# Patient Record
Sex: FEMALE | Race: WHITE | Employment: OTHER | URBAN - METROPOLITAN AREA
[De-identification: names, ages, dates, MRNs, and addresses within clinical notes are randomized per-mention and may not be internally consistent; named-entity substitution may affect disease eponyms.]

---

## 2019-01-24 ENCOUNTER — OP HISTORICAL/CONVERTED ENCOUNTER (OUTPATIENT)
Dept: OTHER | Age: 69
End: 2019-01-24

## 2020-02-25 ENCOUNTER — OP HISTORICAL/CONVERTED ENCOUNTER (OUTPATIENT)
Dept: OTHER | Age: 70
End: 2020-02-25

## 2020-03-06 ENCOUNTER — IP HISTORICAL/CONVERTED ENCOUNTER (OUTPATIENT)
Dept: OTHER | Age: 70
End: 2020-03-06

## 2020-08-06 DIAGNOSIS — M79.661 PAIN IN BOTH LOWER LEGS: Primary | ICD-10-CM

## 2020-08-06 DIAGNOSIS — M79.662 PAIN IN BOTH LOWER LEGS: Primary | ICD-10-CM

## 2020-08-06 RX ORDER — TRAMADOL HYDROCHLORIDE 50 MG/1
50 TABLET ORAL
Qty: 40 TAB | Refills: 0 | Status: SHIPPED | OUTPATIENT
Start: 2020-08-06 | End: 2020-08-16

## 2020-09-04 VITALS
SYSTOLIC BLOOD PRESSURE: 108 MMHG | BODY MASS INDEX: 25.87 KG/M2 | DIASTOLIC BLOOD PRESSURE: 64 MMHG | HEART RATE: 68 BPM | RESPIRATION RATE: 18 BRPM | HEIGHT: 63 IN | WEIGHT: 146 LBS

## 2020-09-04 PROBLEM — M79.7 FIBROMYALGIA: Status: ACTIVE | Noted: 2018-03-28

## 2020-09-04 RX ORDER — EZETIMIBE 10 MG/1
10 TABLET ORAL DAILY
COMMUNITY
End: 2021-08-11 | Stop reason: SDUPTHER

## 2020-09-04 RX ORDER — AMLODIPINE BESYLATE 2.5 MG/1
2.5 TABLET ORAL DAILY
COMMUNITY
End: 2021-07-02

## 2020-09-04 RX ORDER — PREDNISONE 10 MG/1
TABLET ORAL
COMMUNITY
End: 2021-06-01

## 2020-09-04 RX ORDER — ROPINIROLE 0.5 MG/1
TABLET, FILM COATED ORAL 3 TIMES DAILY
COMMUNITY
End: 2021-01-29 | Stop reason: SDUPTHER

## 2020-09-04 RX ORDER — TRAMADOL HYDROCHLORIDE 50 MG/1
50 TABLET ORAL
COMMUNITY
End: 2020-09-18

## 2020-09-04 RX ORDER — OXYCODONE AND ACETAMINOPHEN 5; 325 MG/1; MG/1
TABLET ORAL
COMMUNITY

## 2020-09-04 RX ORDER — ZOLPIDEM TARTRATE 5 MG/1
TABLET ORAL
COMMUNITY
End: 2020-12-15

## 2020-09-04 RX ORDER — LEVOTHYROXINE SODIUM 50 UG/1
50 TABLET ORAL
COMMUNITY
End: 2021-05-03

## 2020-09-04 RX ORDER — LISINOPRIL AND HYDROCHLOROTHIAZIDE 12.5; 2 MG/1; MG/1
TABLET ORAL DAILY
COMMUNITY
End: 2020-12-23

## 2020-09-18 DIAGNOSIS — M79.661 PAIN IN BOTH LOWER LEGS: Primary | ICD-10-CM

## 2020-09-18 DIAGNOSIS — M79.662 PAIN IN BOTH LOWER LEGS: Primary | ICD-10-CM

## 2020-09-18 RX ORDER — TRAMADOL HYDROCHLORIDE 50 MG/1
TABLET ORAL
Qty: 40 TAB | Refills: 0 | Status: SHIPPED | OUTPATIENT
Start: 2020-09-18 | End: 2020-09-28

## 2020-10-02 ENCOUNTER — OFFICE VISIT (OUTPATIENT)
Dept: INTERNAL MEDICINE CLINIC | Age: 70
End: 2020-10-02
Payer: MEDICARE

## 2020-10-02 VITALS
TEMPERATURE: 97.7 F | RESPIRATION RATE: 18 BRPM | BODY MASS INDEX: 27.14 KG/M2 | HEIGHT: 63 IN | DIASTOLIC BLOOD PRESSURE: 80 MMHG | WEIGHT: 153.2 LBS | SYSTOLIC BLOOD PRESSURE: 138 MMHG

## 2020-10-02 DIAGNOSIS — G25.81 RESTLESS LEGS: ICD-10-CM

## 2020-10-02 DIAGNOSIS — I10 ESSENTIAL HYPERTENSION: Primary | ICD-10-CM

## 2020-10-02 PROCEDURE — 99213 OFFICE O/P EST LOW 20 MIN: CPT | Performed by: INTERNAL MEDICINE

## 2020-10-02 RX ORDER — TRAMADOL HYDROCHLORIDE 50 MG/1
50 TABLET ORAL
COMMUNITY
End: 2020-10-25

## 2020-10-02 RX ORDER — DICLOFENAC SODIUM 75 MG/1
1 TABLET, DELAYED RELEASE ORAL 2 TIMES DAILY
COMMUNITY
Start: 2020-08-28 | End: 2021-07-02

## 2020-10-02 NOTE — PROGRESS NOTES
No Almeida presents today for   Chief Complaint   Patient presents with    Hypertension       Is someone accompanying this pt? no    Is the patient using any DME equipment during OV? no    Depression Screening:  3 most recent PHQ Screens 10/2/2020   Little interest or pleasure in doing things Not at all   Feeling down, depressed, irritable, or hopeless Not at all   Total Score PHQ 2 0       Learning Assessment:  Learning Assessment 10/2/2020   PRIMARY LEARNER Patient   HIGHEST LEVEL OF EDUCATION - PRIMARY LEARNER  2 YEARS Wilbert PRIMARY LEARNER NONE   CO-LEARNER CAREGIVER No   PRIMARY LANGUAGE ENGLISH   LEARNER PREFERENCE PRIMARY DEMONSTRATION   ANSWERED BY patient   RELATIONSHIP SELF       Abuse Screening:  Abuse Screening Questionnaire 10/2/2020   Do you ever feel afraid of your partner? N   Are you in a relationship with someone who physically or mentally threatens you? N   Is it safe for you to go home? Y       Fall Risk  Fall Risk Assessment, last 12 mths 10/2/2020   Able to walk? Yes   Fall in past 12 months? No       ADL  ADL Assessment 10/2/2020   Feeding yourself No Help Needed   Getting from bed to chair No Help Needed   Getting dressed No Help Needed   Bathing or showering No Help Needed   Walk across the room (includes cane/walker) No Help Needed   Using the telphone No Help Needed   Taking your medications No Help Needed   Preparing meals No Help Needed   Managing money (expenses/bills) No Help Needed   Moderately strenuous housework (laundry) No Help Needed   Shopping for personal items (toiletries/medicines) No Help Needed   Shopping for groceries No Help Needed   Driving No Help Needed   Climbing a flight of stairs No Help Needed   Getting to places beyond walking distances No Help Needed       Health Maintenance reviewed and discussed and ordered per Provider.     Health Maintenance Due   Topic Date Due    Hepatitis C Screening  1950    DTaP/Tdap/Td series (1 - Tdap) 10/30/1973    Lipid Screen  10/30/1992    Shingrix Vaccine Age 50> (1 of 2) 10/30/2002    Breast Cancer Screen Mammogram  10/30/2002    FOBT Q1Y Age 50-75  10/30/2002    GLAUCOMA SCREENING Q2Y  10/30/2017    Bone Densitometry (Dexa) Screening  10/30/2017    Pneumococcal 65+ years (1 of 1 - PPSV23) 10/30/2017    Flu Vaccine (1) 09/01/2020   . Coordination of Care:  1. Have you been to the ER, urgent care clinic since your last visit? Hospitalized since your last visit? Yes, [facility] for bronchitis     2. Have you seen or consulted any other health care providers outside of the [address] [place] [address] since your last visit? Include any pap smears or colon screening.  no

## 2020-10-02 NOTE — PROGRESS NOTES
Makenna Kaufman is a 81 y.o. female presenting for           Chief Complaint   Patient presents with    Hypertension        HPI Devika Man comes in today for routine follow-up she is generally doing well. With regard to her paroxysmal intestinal obstruction that seems to be better. She still has some discomfort from time to time. Been under a lot of stress for the last 3 weeks is switching to a electronic medical record and she otherwise has generally been doing well. Staying active and working daily. Looking forward to alf. She wants to mention some atypical chest fullness that she is having that she is blaming on her GI situation. Is not exertionally related and is not associated with nausea or vomiting or diaphoresis. Past Medical History:   Diagnosis Date    Allergies     Arthritis     Hypertension         Current Outpatient Medications on File Prior to Visit   Medication Sig Dispense Refill    traMADoL (ULTRAM) 50 mg tablet Take 50 mg by mouth every six (6) hours as needed.  diclofenac EC (VOLTAREN) 75 mg EC tablet Take 1 Tab by mouth two (2) times a day.  amLODIPine (NORVASC) 2.5 mg tablet Take 2.5 mg by mouth daily.  ezetimibe (ZETIA) 10 mg tablet Take 10 mg by mouth daily.  levothyroxine (SYNTHROID) 50 mcg tablet Take 50 mcg by mouth Daily (before breakfast).  lisinopril-hydroCHLOROthiazide (PRINZIDE, ZESTORETIC) 20-12.5 mg per tablet Take  by mouth daily.  rOPINIRole (REQUIP) 0.5 mg tablet Take  by mouth three (3) times daily.  zolpidem (AMBIEN) 5 mg tablet Take  by mouth nightly as needed for Sleep.  oxyCODONE-acetaminophen (PERCOCET) 5-325 mg per tablet Take  by mouth every four (4) hours as needed for Pain.  predniSONE (DELTASONE) 10 mg tablet Take  by mouth daily (with breakfast). No current facility-administered medications on file prior to visit.          ROS systems are reviewed and negative except as noted in the history of present illness  Visit Vitals  /80   Temp 97.7 °F (36.5 °C)   Resp 18   Ht 5' 3\" (1.6 m)   Wt 153 lb 3.2 oz (69.5 kg)   BMI 27.14 kg/m²        Physical Exam well-developed  woman alert oriented cooperative no acute distress normocephalic external ocular motions intact neck supple no lymphadenopathy lungs bilaterally clear to auscultation heart regular rhythm no murmurs or extrasystoles abdomen reveals normal bowel sounds is nontender there is no mass organomegaly no rebound or peritoneal irritation    Assessment & Plan I think we will leave everything where it is for now. Seems stable and she will call us if she is experiences any changes in her chest fullness. Otherwise I will see her in 3 to 4 months.

## 2020-10-24 DIAGNOSIS — M79.661 PAIN IN BOTH LOWER LEGS: Primary | ICD-10-CM

## 2020-10-24 DIAGNOSIS — M79.662 PAIN IN BOTH LOWER LEGS: Primary | ICD-10-CM

## 2020-10-25 RX ORDER — TRAMADOL HYDROCHLORIDE 50 MG/1
TABLET ORAL
Qty: 40 TAB | Refills: 0 | Status: SHIPPED | OUTPATIENT
Start: 2020-10-25 | End: 2020-12-01

## 2020-12-01 DIAGNOSIS — M79.662 PAIN IN BOTH LOWER LEGS: ICD-10-CM

## 2020-12-01 DIAGNOSIS — M79.661 PAIN IN BOTH LOWER LEGS: ICD-10-CM

## 2020-12-01 RX ORDER — TRAMADOL HYDROCHLORIDE 50 MG/1
TABLET ORAL
Qty: 40 TAB | Refills: 0 | Status: SHIPPED | OUTPATIENT
Start: 2020-12-01 | End: 2020-12-03

## 2020-12-03 DIAGNOSIS — M79.661 PAIN IN BOTH LOWER LEGS: ICD-10-CM

## 2020-12-03 DIAGNOSIS — M79.662 PAIN IN BOTH LOWER LEGS: ICD-10-CM

## 2020-12-03 RX ORDER — TRAMADOL HYDROCHLORIDE 50 MG/1
TABLET ORAL
Qty: 40 TAB | Refills: 0 | Status: SHIPPED | OUTPATIENT
Start: 2020-12-03 | End: 2021-03-16

## 2020-12-15 DIAGNOSIS — F51.01 PRIMARY INSOMNIA: Primary | ICD-10-CM

## 2020-12-15 RX ORDER — ZOLPIDEM TARTRATE 5 MG/1
TABLET ORAL
Qty: 15 TAB | Refills: 0 | Status: SHIPPED | OUTPATIENT
Start: 2020-12-15 | End: 2021-01-25

## 2020-12-23 RX ORDER — LISINOPRIL AND HYDROCHLOROTHIAZIDE 12.5; 2 MG/1; MG/1
TABLET ORAL
Qty: 90 TAB | Refills: 3 | Status: SHIPPED | OUTPATIENT
Start: 2020-12-23 | End: 2021-06-02

## 2021-01-22 DIAGNOSIS — F51.01 PRIMARY INSOMNIA: ICD-10-CM

## 2021-01-25 RX ORDER — ZOLPIDEM TARTRATE 5 MG/1
TABLET ORAL
Qty: 15 TAB | Refills: 0 | Status: SHIPPED | OUTPATIENT
Start: 2021-01-25 | End: 2021-03-08

## 2021-01-29 ENCOUNTER — OFFICE VISIT (OUTPATIENT)
Dept: INTERNAL MEDICINE CLINIC | Age: 71
End: 2021-01-29
Payer: MEDICARE

## 2021-01-29 VITALS
HEART RATE: 72 BPM | RESPIRATION RATE: 20 BRPM | WEIGHT: 150.8 LBS | SYSTOLIC BLOOD PRESSURE: 135 MMHG | DIASTOLIC BLOOD PRESSURE: 84 MMHG | TEMPERATURE: 97.3 F | HEIGHT: 63 IN | BODY MASS INDEX: 26.72 KG/M2

## 2021-01-29 DIAGNOSIS — F51.01 PRIMARY INSOMNIA: Primary | ICD-10-CM

## 2021-01-29 DIAGNOSIS — I10 ESSENTIAL HYPERTENSION: ICD-10-CM

## 2021-01-29 DIAGNOSIS — G25.81 RESTLESS LEGS: ICD-10-CM

## 2021-01-29 PROCEDURE — G8536 NO DOC ELDER MAL SCRN: HCPCS | Performed by: INTERNAL MEDICINE

## 2021-01-29 PROCEDURE — G8752 SYS BP LESS 140: HCPCS | Performed by: INTERNAL MEDICINE

## 2021-01-29 PROCEDURE — G8419 CALC BMI OUT NRM PARAM NOF/U: HCPCS | Performed by: INTERNAL MEDICINE

## 2021-01-29 PROCEDURE — G8510 SCR DEP NEG, NO PLAN REQD: HCPCS | Performed by: INTERNAL MEDICINE

## 2021-01-29 PROCEDURE — 1101F PT FALLS ASSESS-DOCD LE1/YR: CPT | Performed by: INTERNAL MEDICINE

## 2021-01-29 PROCEDURE — G8754 DIAS BP LESS 90: HCPCS | Performed by: INTERNAL MEDICINE

## 2021-01-29 PROCEDURE — 99213 OFFICE O/P EST LOW 20 MIN: CPT | Performed by: INTERNAL MEDICINE

## 2021-01-29 PROCEDURE — G8427 DOCREV CUR MEDS BY ELIG CLIN: HCPCS | Performed by: INTERNAL MEDICINE

## 2021-01-29 PROCEDURE — 1090F PRES/ABSN URINE INCON ASSESS: CPT | Performed by: INTERNAL MEDICINE

## 2021-01-29 PROCEDURE — 3017F COLORECTAL CA SCREEN DOC REV: CPT | Performed by: INTERNAL MEDICINE

## 2021-01-29 PROCEDURE — G8400 PT W/DXA NO RESULTS DOC: HCPCS | Performed by: INTERNAL MEDICINE

## 2021-01-29 RX ORDER — DICYCLOMINE HYDROCHLORIDE 10 MG/1
10 CAPSULE ORAL
COMMUNITY

## 2021-01-29 RX ORDER — ROPINIROLE 0.5 MG/1
TABLET, FILM COATED ORAL
Qty: 180 TAB | Refills: 3 | Status: SHIPPED | OUTPATIENT
Start: 2021-01-29

## 2021-01-29 NOTE — PROGRESS NOTES
Jenelle Asencio is a 78 y.o. female presenting for tension and restless leg syndrome. Chief Complaint   Patient presents with    Follow-up        HPI Pee Hurley comes in and she is generally doing well. She had a little bit of a sniffle and cough before she got here and so she took a Mucinex. She is concerned that that might bump her blood pressure up a little bit. At any rate she feels well and is looking forward to halfway at the end of next month. She is only working a few shifts between now and then and then will be fully retired. She is excited and looking forward to it. Believes she will have plenty of things to keep her busy. Issue that she does mention is her ropinirole. Normally 0.5 controls her restless legs but some days if she has a difficult day it will take 2 pills rather than 1 and she runs out early. She wonders if she can get her prescription changed and I told her that we would do that. She is sleeping well staying active and feels well. She has not had any recurrence of her partial small bowel obstruction. Past Medical History:   Diagnosis Date    Allergies     Arthritis     Hypertension         Current Outpatient Medications on File Prior to Visit   Medication Sig Dispense Refill    dicyclomine (BENTYL) 10 mg capsule Take 10 mg by mouth four (4) times daily as needed.  zolpidem (AMBIEN) 5 mg tablet TAKE 1 TABLET BY MOUTH AT BEDTIME AS NEEDED  15 Tab 0    lisinopril-hydroCHLOROthiazide (PRINZIDE, ZESTORETIC) 20-12.5 mg per tablet TAKE 1 TABLET TWICE A DAY 90 Tab 3    diclofenac EC (VOLTAREN) 75 mg EC tablet Take 1 Tab by mouth two (2) times a day.  amLODIPine (NORVASC) 2.5 mg tablet Take 2.5 mg by mouth daily.  ezetimibe (ZETIA) 10 mg tablet Take 10 mg by mouth daily.  levothyroxine (SYNTHROID) 50 mcg tablet Take 50 mcg by mouth Daily (before breakfast).  rOPINIRole (REQUIP) 0.5 mg tablet Take  by mouth three (3) times daily.       oxyCODONE-acetaminophen (PERCOCET) 5-325 mg per tablet Take  by mouth every four (4) hours as needed for Pain.  predniSONE (DELTASONE) 10 mg tablet Take  by mouth daily (with breakfast). No current facility-administered medications on file prior to visit. ROS all systems reviewed and negative except as noted in the history of present illness  Visit Vitals  Temp 97.3 °F (36.3 °C)   Resp 20   Ht 5' 3\" (1.6 m)   Wt 150 lb 12.8 oz (68.4 kg)   BMI 26.71 kg/m²        Physical Exam developed well-nourished  woman alert oriented cooperative no acute distress normocephalic pink conjunctiva anicteric sclera neck without lymphadenopathy lungs bilaterally clear to auscultation heart regular rhythm no murmurs or extrasystoles no edema      Assessment & Plan I think Tarah is doing great and I am satisfied with her present status. I will see her back routinely in 4 months and she will call us if she has problems before that.

## 2021-01-29 NOTE — PROGRESS NOTES
Radha Dumas presents today for   Chief Complaint   Patient presents with    Follow-up       Is someone accompanying this pt? no    Is the patient using any DME equipment during OV? no    Depression Screening:  3 most recent PHQ Screens 1/29/2021   Little interest or pleasure in doing things Not at all   Feeling down, depressed, irritable, or hopeless Not at all   Total Score PHQ 2 0       Learning Assessment:  Learning Assessment 10/2/2020   PRIMARY LEARNER Patient   HIGHEST LEVEL OF EDUCATION - PRIMARY LEARNER  2 YEARS Wilbert PRIMARY LEARNER NONE   CO-LEARNER CAREGIVER No   PRIMARY LANGUAGE ENGLISH   LEARNER PREFERENCE PRIMARY DEMONSTRATION   ANSWERED BY patient   RELATIONSHIP SELF       Abuse Screening:  Abuse Screening Questionnaire 10/2/2020   Do you ever feel afraid of your partner? N   Are you in a relationship with someone who physically or mentally threatens you? N   Is it safe for you to go home? Y       Fall Risk  Fall Risk Assessment, last 12 mths 1/29/2021   Able to walk? Yes   Fall in past 12 months? 0       ADL  ADL Assessment 10/2/2020   Feeding yourself No Help Needed   Getting from bed to chair No Help Needed   Getting dressed No Help Needed   Bathing or showering No Help Needed   Walk across the room (includes cane/walker) No Help Needed   Using the telphone No Help Needed   Taking your medications No Help Needed   Preparing meals No Help Needed   Managing money (expenses/bills) No Help Needed   Moderately strenuous housework (laundry) No Help Needed   Shopping for personal items (toiletries/medicines) No Help Needed   Shopping for groceries No Help Needed   Driving No Help Needed   Climbing a flight of stairs No Help Needed   Getting to places beyond walking distances No Help Needed       Health Maintenance reviewed and discussed and ordered per Provider.     Health Maintenance Due   Topic Date Due    Hepatitis C Screening  1950    COVID-19 Vaccine (1 of 2) 10/30/1968  DTaP/Tdap/Td series (1 - Tdap) 10/30/1973    Lipid Screen  10/30/1992    Shingrix Vaccine Age 50> (1 of 2) 10/30/2002    Breast Cancer Screen Mammogram  10/30/2002    GLAUCOMA SCREENING Q2Y  10/30/2017    Bone Densitometry (Dexa) Screening  10/30/2017    Pneumococcal 65+ years (1 of 1 - PPSV23) 10/30/2017    Flu Vaccine (1) 09/01/2020    Medicare Yearly Exam  10/02/2020   . Coordination of Care:  1. Have you been to the ER, urgent care clinic since your last visit? Hospitalized since your last visit? no    2. Have you seen or consulted any other health care providers outside of the [address] [place] [address] since your last visit? Include any pap smears or colon screening.  no

## 2021-03-08 DIAGNOSIS — F51.01 PRIMARY INSOMNIA: ICD-10-CM

## 2021-03-08 RX ORDER — ZOLPIDEM TARTRATE 5 MG/1
TABLET ORAL
Qty: 15 TAB | Refills: 0 | Status: SHIPPED | OUTPATIENT
Start: 2021-03-08 | End: 2021-09-22

## 2021-03-16 DIAGNOSIS — M79.662 PAIN IN BOTH LOWER LEGS: ICD-10-CM

## 2021-03-16 DIAGNOSIS — M79.661 PAIN IN BOTH LOWER LEGS: ICD-10-CM

## 2021-03-16 RX ORDER — TRAMADOL HYDROCHLORIDE 50 MG/1
TABLET ORAL
Qty: 40 TAB | Refills: 0 | Status: SHIPPED | OUTPATIENT
Start: 2021-03-16 | End: 2021-04-22

## 2021-04-22 DIAGNOSIS — M79.661 PAIN IN BOTH LOWER LEGS: ICD-10-CM

## 2021-04-22 DIAGNOSIS — M79.662 PAIN IN BOTH LOWER LEGS: ICD-10-CM

## 2021-04-22 RX ORDER — TRAMADOL HYDROCHLORIDE 50 MG/1
TABLET ORAL
Qty: 40 TAB | Refills: 0 | Status: SHIPPED | OUTPATIENT
Start: 2021-04-22 | End: 2021-06-02

## 2021-05-03 RX ORDER — LEVOTHYROXINE SODIUM 50 UG/1
TABLET ORAL
Qty: 90 TAB | Refills: 3 | Status: SHIPPED | OUTPATIENT
Start: 2021-05-03

## 2021-06-01 ENCOUNTER — OFFICE VISIT (OUTPATIENT)
Dept: INTERNAL MEDICINE CLINIC | Age: 71
End: 2021-06-01
Payer: MEDICARE

## 2021-06-01 VITALS
BODY MASS INDEX: 26.4 KG/M2 | DIASTOLIC BLOOD PRESSURE: 80 MMHG | WEIGHT: 149 LBS | RESPIRATION RATE: 20 BRPM | HEIGHT: 63 IN | SYSTOLIC BLOOD PRESSURE: 138 MMHG

## 2021-06-01 DIAGNOSIS — Z78.0 POST-MENOPAUSAL: ICD-10-CM

## 2021-06-01 DIAGNOSIS — I10 ESSENTIAL HYPERTENSION: ICD-10-CM

## 2021-06-01 DIAGNOSIS — Z00.00 ENCOUNTER FOR ANNUAL WELLNESS EXAM IN MEDICARE PATIENT: Primary | ICD-10-CM

## 2021-06-01 DIAGNOSIS — E78.5 HYPERLIPIDEMIA, UNSPECIFIED HYPERLIPIDEMIA TYPE: ICD-10-CM

## 2021-06-01 DIAGNOSIS — M79.661 PAIN IN BOTH LOWER LEGS: ICD-10-CM

## 2021-06-01 DIAGNOSIS — M79.662 PAIN IN BOTH LOWER LEGS: ICD-10-CM

## 2021-06-01 DIAGNOSIS — Z00.00 MEDICARE ANNUAL WELLNESS VISIT, SUBSEQUENT: ICD-10-CM

## 2021-06-01 PROCEDURE — G8752 SYS BP LESS 140: HCPCS | Performed by: INTERNAL MEDICINE

## 2021-06-01 PROCEDURE — G0439 PPPS, SUBSEQ VISIT: HCPCS | Performed by: INTERNAL MEDICINE

## 2021-06-01 PROCEDURE — 1101F PT FALLS ASSESS-DOCD LE1/YR: CPT | Performed by: INTERNAL MEDICINE

## 2021-06-01 PROCEDURE — G8536 NO DOC ELDER MAL SCRN: HCPCS | Performed by: INTERNAL MEDICINE

## 2021-06-01 PROCEDURE — G8419 CALC BMI OUT NRM PARAM NOF/U: HCPCS | Performed by: INTERNAL MEDICINE

## 2021-06-01 PROCEDURE — G8510 SCR DEP NEG, NO PLAN REQD: HCPCS | Performed by: INTERNAL MEDICINE

## 2021-06-01 PROCEDURE — G8754 DIAS BP LESS 90: HCPCS | Performed by: INTERNAL MEDICINE

## 2021-06-01 PROCEDURE — G9899 SCRN MAM PERF RSLTS DOC: HCPCS | Performed by: INTERNAL MEDICINE

## 2021-06-01 PROCEDURE — G8427 DOCREV CUR MEDS BY ELIG CLIN: HCPCS | Performed by: INTERNAL MEDICINE

## 2021-06-01 PROCEDURE — G8400 PT W/DXA NO RESULTS DOC: HCPCS | Performed by: INTERNAL MEDICINE

## 2021-06-01 PROCEDURE — 3017F COLORECTAL CA SCREEN DOC REV: CPT | Performed by: INTERNAL MEDICINE

## 2021-06-01 NOTE — PATIENT INSTRUCTIONS
Medicare Wellness Visit, Female The best way to live healthy is to have a lifestyle where you eat a well-balanced diet, exercise regularly, limit alcohol use, and quit all forms of tobacco/nicotine, if applicable. Regular preventive services are another way to keep healthy. Preventive services (vaccines, screening tests, monitoring & exams) can help personalize your care plan, which helps you manage your own care. Screening tests can find health problems at the earliest stages, when they are easiest to treat. [facility] follows the current, evidence-based guidelines published by the [facility] Segal ([facility]) when recommending preventive services for our patients. Because we follow these guidelines, sometimes recommendations change over time as research supports it. (For example, mammograms used to be recommended annually. Even though Medicare will still pay for an annual mammogram, the newer guidelines recommend a mammogram every two years for women of average risk). Of course, you and your doctor may decide to screen more often for some diseases, based on your risk and your co-morbidities (chronic disease you are already diagnosed with). Preventive services for you include: - Medicare offers their members a free annual wellness visit, which is time for you and your primary care provider to discuss and plan for your preventive service needs. Take advantage of this benefit every year! 
-All adults over the age of 72 should receive the recommended pneumonia vaccines. Current USPSTF guidelines recommend a series of two vaccines for the best pneumonia protection.  
-All adults should have a flu vaccine yearly and a tetanus vaccine every 10 years.  
-All adults age 48 and older should receive the shingles vaccines (series of two vaccines).      
-All adults age 38-68 who are overweight should have a diabetes screening test once every three years.  
-All adults born between [birth year] and 1965 should be screened once for Hepatitis C. 
-Other screening tests and preventive services for persons with diabetes include: an eye exam to screen for diabetic retinopathy, a kidney function test, a foot exam, and stricter control over your cholesterol.  
-Cardiovascular screening for adults with routine risk involves an electrocardiogram (ECG) at intervals determined by your doctor.  
-Colorectal cancer screenings should be done for adults age 54-65 with no increased risk factors for colorectal cancer. There are a number of acceptable methods of screening for this type of cancer. Each test has its own benefits and drawbacks. Discuss with your doctor what is most appropriate for you during your annual wellness visit. The different tests include: colonoscopy (considered the best screening method), a fecal occult blood test, a fecal DNA test, and sigmoidoscopy. 
 
-A bone mass density test is recommended when a woman turns 65 to screen for osteoporosis. This test is only recommended one time, as a screening. Some providers will use this same test as a disease monitoring tool if you already have osteoporosis. -Breast cancer screenings are recommended every other year for women of normal risk, age 54-69. 
-Cervical cancer screenings for women over age 72 are only recommended with certain risk factors. Here is a list of your current Health Maintenance items (your personalized list of preventive services) with a due date: 
Health Maintenance Due Topic Date Due  
 Hepatitis C Test  Never done  COVID-19 Vaccine (1) Never done  DTaP/Tdap/Td  (1 - Tdap) Never done  Cholesterol Test   Never done  Shingles Vaccine (1 of 2) Never done  Bone Mineral Density   Never done  Pneumococcal Vaccine (1 of 1 - PPSV23) Never done

## 2021-06-01 NOTE — PROGRESS NOTES
This is the Subsequent Medicare Annual Wellness Exam, performed 12 months or more after the Initial AWV or the last Subsequent AWV    I have reviewed the patient's medical history in detail and updated the computerized patient record. Assessment/Plan   Education and counseling provided:  Are appropriate based on today's review and evaluation    1. Encounter for annual wellness exam in Medicare patient  2. Medicare annual wellness visit, subsequent       Depression Risk Factor Screening     3 most recent PHQ Screens 6/1/2021   Little interest or pleasure in doing things Not at all   Feeling down, depressed, irritable, or hopeless Not at all   Total Score PHQ 2 0       Alcohol Risk Screen    Do you average more than 1 drink per night or more than 7 drinks a week:  No    On any one occasion in the past three months have you have had more than 3 drinks containing alcohol:  No        Functional Ability and Level of Safety    Hearing: Hearing is good. Activities of Daily Living: The home contains: no safety equipment. Patient does total self care      Ambulation: with no difficulty     Fall Risk:  Fall Risk Assessment, last 12 mths 6/1/2021   Able to walk? Yes   Fall in past 12 months? 0   Do you feel unsteady?  0   Are you worried about falling 0      Abuse Screen:  Patient is not abused       Cognitive Screening    Has your family/caregiver stated any concerns about your memory: no     Cognitive Screening: Normal - Verbal Fluency Test    Health Maintenance Due     Health Maintenance Due   Topic Date Due    Hepatitis C Screening  Never done    COVID-19 Vaccine (1) Never done    DTaP/Tdap/Td series (1 - Tdap) Never done    Lipid Screen  Never done    Shingrix Vaccine Age 50> (1 of 2) Never done    Bone Densitometry (Dexa) Screening  Never done    Pneumococcal 65+ years (1 of 1 - PPSV23) Never done       Patient Care Team   Patient Care Team:  Parris Skinner MD as PCP - General (Internal Medicine)  Yasmani Skinner MD as PCP - [facility]    History     Patient Active Problem List   Diagnosis Code    Essential hypertension I10    Fibromyalgia M79.7    Hyperlipidemia E78.5    Hypothyroidism E03.9    Insomnia G47.00    Pain in lower limb M79.606    Restless legs G25.81    Cervical spondylosis without myelopathy M47.812    CTS (carpal tunnel syndrome) G56.00     Past Medical History:   Diagnosis Date    Allergies     Arthritis     Hypertension       Past Surgical History:   Procedure Laterality Date    BREAST SURGERY PROCEDURE UNLISTED Right     biopsyr    HX COLONOSCOPY  07/08/2016    HX ORTHOPAEDIC      both hips replaced, carpal tunnel b/l,     HX OTHER SURGICAL      ovary removed     Current Outpatient Medications   Medication Sig Dispense Refill    levothyroxine (SYNTHROID) 50 mcg tablet TAKE 1 TABLET DAILY 90 Tab 3    zolpidem (AMBIEN) 5 mg tablet TAKE 1 TABLET BY MOUTH AT BEDTIME AS NEEDED  15 Tab 0    dicyclomine (BENTYL) 10 mg capsule Take 10 mg by mouth four (4) times daily as needed.  rOPINIRole (REQUIP) 0.5 mg tablet 1 to 2 tablets hs prn 180 Tab 3    lisinopril-hydroCHLOROthiazide (PRINZIDE, ZESTORETIC) 20-12.5 mg per tablet TAKE 1 TABLET TWICE A DAY 90 Tab 3    diclofenac EC (VOLTAREN) 75 mg EC tablet Take 1 Tab by mouth two (2) times a day.  amLODIPine (NORVASC) 2.5 mg tablet Take 2.5 mg by mouth daily.  ezetimibe (ZETIA) 10 mg tablet Take 10 mg by mouth daily.  oxyCODONE-acetaminophen (PERCOCET) 5-325 mg per tablet Take  by mouth every four (4) hours as needed for Pain.        Allergies   Allergen Reactions    Celecoxib Other (comments)     mental confusion  Other reaction(s): psychological reaction  Confusion      Chicken Derived Nausea and Vomiting    Doxycycline Anxiety    Doxycycline Hyclate Other (comments)    Phenergan [Promethazine] Other (comments)     extremely restless    Statins-Hmg-Coa Reductase Inhibitors Myalgia Mild to moderate       Family History   Problem Relation Age of Onset    Arthritis-osteo Mother     Cancer Mother         lung    Other Father         CHF    Hypertension Sister     Other Sister         hypercholesterolemia    Diabetes Brother      Social History     Tobacco Use    Smoking status: Never Smoker    Smokeless tobacco: Never Used   Substance Use Topics    Alcohol use: Not on file         Donald Seay LPN

## 2021-06-01 NOTE — PROGRESS NOTES
Yazmin Gonzalez is a 78 y.o. female presenting for annual Medicare wellness          Chief Complaint   Patient presents with    Hypertension    Annual Wellness Visit        HPI [facility] is seen today. She feels well. 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 She is up-to-date on screening colonoscopies and eye exams. We talked about vaccinations and she is on trusting of the Covid vaccine. She says she does not want her to RNA altered. She is not willing to get it despite my discussion with her. She will consider the shingles vaccine but she is up-to-date on tetanus she says    Past Medical History:   Diagnosis Date    Allergies     Arthritis     Hypertension         Current Outpatient Medications on File Prior to Visit   Medication Sig Dispense Refill    levothyroxine (SYNTHROID) 50 mcg tablet TAKE 1 TABLET DAILY 90 Tab 3    zolpidem (AMBIEN) 5 mg tablet TAKE 1 TABLET BY MOUTH AT BEDTIME AS NEEDED  15 Tab 0    dicyclomine (BENTYL) 10 mg capsule Take 10 mg by mouth four (4) times daily as needed.  rOPINIRole (REQUIP) 0.5 mg tablet 1 to 2 tablets hs prn 180 Tab 3    lisinopril-hydroCHLOROthiazide (PRINZIDE, ZESTORETIC) 20-12.5 mg per tablet TAKE 1 TABLET TWICE A DAY 90 Tab 3    diclofenac EC (VOLTAREN) 75 mg EC tablet Take 1 Tab by mouth two (2) times a day.  amLODIPine (NORVASC) 2.5 mg tablet Take 2.5 mg by mouth daily.  ezetimibe (ZETIA) 10 mg tablet Take 10 mg by mouth daily.  oxyCODONE-acetaminophen (PERCOCET) 5-325 mg per tablet Take  by mouth every four (4) hours as needed for Pain.  [DISCONTINUED] predniSONE (DELTASONE) 10 mg tablet Take  by mouth daily (with breakfast). No current facility-administered medications on file prior to visit. ROS patient is active and generally feels well without complaint.     Visit Vitals  BP 138/80 (BP 1 Location: Right arm, BP Patient Position: Sitting, BP Cuff Size: Adult)   Resp 20   Ht 5' 3\" (1.6 m)   Wt 149 lb (67.6 kg)   BMI 26.39 kg/m²        Physical Exam well-developed  woman alert oriented cooperative no acute distress normocephalic conjunctiva pink sclera anicteric neck no lymphadenopathy or mass no supraclavicular mass lungs bilaterally clear to auscultation heart regular rhythm no murmurs gallops or extrasystoles abdomen soft nontender I felt her liver edge on inspiration below her right costal margin its not tender and smooth I was unable to feel her spleen she had no abdominal mass or tenderness extremities reveal good peripheral pulses neurological physiologic    Assessment & Plan Tarah will get fasting blood work as well as a bone density and we will see her routinely        Marianne Michelle MD

## 2021-06-02 RX ORDER — TRAMADOL HYDROCHLORIDE 50 MG/1
TABLET ORAL
Qty: 40 TABLET | Refills: 0 | Status: SHIPPED | OUTPATIENT
Start: 2021-06-02 | End: 2021-07-14

## 2021-06-02 RX ORDER — LISINOPRIL AND HYDROCHLOROTHIAZIDE 12.5; 2 MG/1; MG/1
TABLET ORAL
Qty: 90 TABLET | Refills: 3 | Status: SHIPPED | OUTPATIENT
Start: 2021-06-02 | End: 2021-12-28

## 2021-06-08 ENCOUNTER — HOSPITAL ENCOUNTER (OUTPATIENT)
Dept: LAB | Age: 71
Discharge: HOME | End: 2021-06-08
Payer: MEDICARE

## 2021-06-08 LAB
ALBUMIN SERPL-MCNC: 4 G/DL (ref 3.5–4.7)
ALBUMIN/GLOB SERPL: 1.2 {RATIO}
ALP SERPL-CCNC: 48 U/L (ref 38–126)
ALT SERPL-CCNC: 23 U/L (ref 3–52)
ANION GAP SERPL CALC-SCNC: 7 MMOL/L
AST SERPL W P-5'-P-CCNC: 24 U/L (ref 14–74)
BASOPHILS # BLD: 0.1 K/UL (ref 0–0.1)
BASOPHILS NFR BLD: 1 % (ref 0–2)
BILIRUB SERPL-MCNC: 0.5 MG/DL (ref 0.2–1)
BUN SERPL-MCNC: 15 MG/DL (ref 9–21)
BUN/CREAT SERPL: 15
CA-I BLD-MCNC: 9.6 MG/DL (ref 8.5–10.5)
CHLORIDE SERPL-SCNC: 98 MMOL/L (ref 94–111)
CO2 SERPL-SCNC: 30 MMOL/L (ref 21–33)
CREAT SERPL-MCNC: 1 MG/DL (ref 0.7–1.2)
EOSINOPHIL # BLD: 0.2 K/UL (ref 0–0.4)
EOSINOPHIL NFR BLD: 5 % (ref 0–5)
ERYTHROCYTE [DISTWIDTH] IN BLOOD BY AUTOMATED COUNT: 14.2 % (ref 11.6–14.5)
GLOBULIN SER CALC-MCNC: 3.3 G/DL
GLUCOSE SERPL-MCNC: 80 MG/DL (ref 70–110)
HCT VFR BLD AUTO: 40 % (ref 35–45)
HGB BLD-MCNC: 13 G/DL (ref 12–16)
IMM GRANULOCYTES # BLD AUTO: 0 K/UL
IMM GRANULOCYTES NFR BLD AUTO: 0 %
LYMPHOCYTES # BLD: 1.8 K/UL (ref 0.9–3.6)
LYMPHOCYTES NFR BLD: 35 % (ref 21–52)
MCH RBC QN AUTO: 28.3 PG (ref 24–34)
MCHC RBC AUTO-ENTMCNC: 32.5 G/DL (ref 31–37)
MCV RBC AUTO: 87.1 FL (ref 74–97)
MONOCYTES # BLD: 0.5 K/UL (ref 0.05–1.2)
MONOCYTES NFR BLD: 10 % (ref 3–10)
NEUTS SEG # BLD: 2.6 K/UL (ref 1.8–8)
NEUTS SEG NFR BLD: 49 % (ref 40–73)
PLATELET # BLD AUTO: 373 K/UL (ref 135–420)
PMV BLD AUTO: 9.9 FL (ref 9.2–11.8)
POTASSIUM SERPL-SCNC: 3.5 MMOL/L (ref 3.2–5.1)
PROT SERPL-MCNC: 7.3 G/DL (ref 6.1–8.4)
RBC # BLD AUTO: 4.59 M/UL (ref 4.2–5.3)
SODIUM SERPL-SCNC: 135 MMOL/L (ref 135–145)
WBC # BLD AUTO: 5.3 K/UL (ref 4.6–13.2)

## 2021-06-08 PROCEDURE — 80053 COMPREHEN METABOLIC PANEL: CPT

## 2021-06-08 PROCEDURE — 36415 COLL VENOUS BLD VENIPUNCTURE: CPT

## 2021-06-08 PROCEDURE — 80061 LIPID PANEL: CPT

## 2021-06-08 PROCEDURE — 85025 COMPLETE CBC W/AUTO DIFF WBC: CPT

## 2021-06-09 LAB
CHOLEST SERPL-MCNC: 250 MG/DL
HDLC SERPL-MCNC: 68 MG/DL
HDLC SERPL: 3.7 {RATIO} (ref 0–5)
LDLC SERPL CALC-MCNC: 154.6 MG/DL (ref 0–100)
LIPID PROFILE,FLP: ABNORMAL
TRIGL SERPL-MCNC: 137 MG/DL (ref ?–150)
VLDLC SERPL CALC-MCNC: 27.4 MG/DL

## 2021-06-15 ENCOUNTER — HOSPITAL ENCOUNTER (OUTPATIENT)
Dept: MAMMOGRAPHY | Age: 71
Discharge: HOME | End: 2021-06-15
Attending: INTERNAL MEDICINE
Payer: MEDICARE

## 2021-06-15 DIAGNOSIS — Z78.0 POST-MENOPAUSAL: ICD-10-CM

## 2021-06-15 PROCEDURE — 77080 DXA BONE DENSITY AXIAL: CPT

## 2021-07-02 RX ORDER — DICLOFENAC SODIUM 75 MG/1
TABLET, DELAYED RELEASE ORAL
Qty: 180 TABLET | Refills: 3 | Status: SHIPPED | OUTPATIENT
Start: 2021-07-02 | End: 2021-07-08 | Stop reason: SDUPTHER

## 2021-07-02 RX ORDER — AMLODIPINE BESYLATE 2.5 MG/1
TABLET ORAL
Qty: 90 TABLET | Refills: 3 | Status: SHIPPED | OUTPATIENT
Start: 2021-07-02

## 2021-07-08 DIAGNOSIS — Z76.0 MEDICATION REFILL: Primary | ICD-10-CM

## 2021-07-08 RX ORDER — DICLOFENAC SODIUM 75 MG/1
TABLET, DELAYED RELEASE ORAL
Qty: 180 TABLET | Refills: 3 | Status: SHIPPED | OUTPATIENT
Start: 2021-07-08

## 2021-07-09 ENCOUNTER — TELEPHONE (OUTPATIENT)
Dept: INTERNAL MEDICINE CLINIC | Age: 71
End: 2021-07-09

## 2021-07-09 NOTE — TELEPHONE ENCOUNTER
[facility] calls to ask about celebrex allergy in regards to filling diclofenac. Patient has a celebrex \"sensitivity\" and has been on Diclofenac since at least 2014. I told them to fill.  Crystal Rankin LPN

## 2021-07-13 DIAGNOSIS — M79.661 PAIN IN BOTH LOWER LEGS: ICD-10-CM

## 2021-07-13 DIAGNOSIS — M79.662 PAIN IN BOTH LOWER LEGS: ICD-10-CM

## 2021-07-14 RX ORDER — TRAMADOL HYDROCHLORIDE 50 MG/1
TABLET ORAL
Qty: 40 TABLET | Refills: 0 | Status: SHIPPED | OUTPATIENT
Start: 2021-07-14 | End: 2021-08-25

## 2021-08-12 RX ORDER — EZETIMIBE 10 MG/1
10 TABLET ORAL DAILY
Qty: 90 TABLET | Refills: 2 | Status: SHIPPED | OUTPATIENT
Start: 2021-08-12 | End: 2021-11-10

## 2021-08-25 DIAGNOSIS — M79.662 PAIN IN BOTH LOWER LEGS: ICD-10-CM

## 2021-08-25 DIAGNOSIS — M79.661 PAIN IN BOTH LOWER LEGS: ICD-10-CM

## 2021-08-25 RX ORDER — TRAMADOL HYDROCHLORIDE 50 MG/1
TABLET ORAL
Qty: 40 TABLET | Refills: 0 | Status: SHIPPED | OUTPATIENT
Start: 2021-08-25 | End: 2021-10-19

## 2021-09-22 DIAGNOSIS — F51.01 PRIMARY INSOMNIA: ICD-10-CM

## 2021-09-22 RX ORDER — ZOLPIDEM TARTRATE 5 MG/1
TABLET ORAL
Qty: 15 TABLET | Refills: 0 | Status: SHIPPED | OUTPATIENT
Start: 2021-09-22

## 2021-10-04 ENCOUNTER — OFFICE VISIT (OUTPATIENT)
Dept: INTERNAL MEDICINE CLINIC | Age: 71
End: 2021-10-04
Payer: MEDICARE

## 2021-10-04 VITALS — RESPIRATION RATE: 20 BRPM | BODY MASS INDEX: 26.9 KG/M2 | HEIGHT: 63 IN | WEIGHT: 151.8 LBS

## 2021-10-04 DIAGNOSIS — F51.01 PRIMARY INSOMNIA: Primary | ICD-10-CM

## 2021-10-04 DIAGNOSIS — E78.5 HYPERLIPIDEMIA, UNSPECIFIED HYPERLIPIDEMIA TYPE: ICD-10-CM

## 2021-10-04 DIAGNOSIS — I10 ESSENTIAL HYPERTENSION: ICD-10-CM

## 2021-10-04 PROCEDURE — 1090F PRES/ABSN URINE INCON ASSESS: CPT | Performed by: INTERNAL MEDICINE

## 2021-10-04 PROCEDURE — G8756 NO BP MEASURE DOC: HCPCS | Performed by: INTERNAL MEDICINE

## 2021-10-04 PROCEDURE — 99213 OFFICE O/P EST LOW 20 MIN: CPT | Performed by: INTERNAL MEDICINE

## 2021-10-04 PROCEDURE — G8419 CALC BMI OUT NRM PARAM NOF/U: HCPCS | Performed by: INTERNAL MEDICINE

## 2021-10-04 PROCEDURE — G9899 SCRN MAM PERF RSLTS DOC: HCPCS | Performed by: INTERNAL MEDICINE

## 2021-10-04 PROCEDURE — G8399 PT W/DXA RESULTS DOCUMENT: HCPCS | Performed by: INTERNAL MEDICINE

## 2021-10-04 PROCEDURE — 1101F PT FALLS ASSESS-DOCD LE1/YR: CPT | Performed by: INTERNAL MEDICINE

## 2021-10-04 PROCEDURE — G8427 DOCREV CUR MEDS BY ELIG CLIN: HCPCS | Performed by: INTERNAL MEDICINE

## 2021-10-04 PROCEDURE — 3017F COLORECTAL CA SCREEN DOC REV: CPT | Performed by: INTERNAL MEDICINE

## 2021-10-04 PROCEDURE — G8536 NO DOC ELDER MAL SCRN: HCPCS | Performed by: INTERNAL MEDICINE

## 2021-10-04 PROCEDURE — G8432 DEP SCR NOT DOC, RNG: HCPCS | Performed by: INTERNAL MEDICINE

## 2021-10-04 NOTE — PROGRESS NOTES
Evelyn Urban is a 78 y.o. female presenting for follow-up of hypertension and hyperlipidemia          No chief complaint on file. HPI pain comes in today and she is generally doing well. She tells me she had episode of Covid that was passed around the family and she says she went online and received ivermectin which she believes did wonders for her. Right she now believes she has a natural immunity and plans to receive the vaccine. We reviewed her blood work from the last time and her blood sugar looks good but her cholesterol is still little high. She was intolerant of all statins and is now on Zetia and then will just have to do more work on diet and exercise. Otherwise labs unremarkable. She tells me she had an episode of Covid which she picked up from a family member. She was diagnosed at an urgent care center and declined hydroxychloroquine and prednisone and instead went online and got some ivermectin which she believes did wonders for her. Otherwise generally doing well    Past Medical History:   Diagnosis Date    Allergies     Arthritis     Hypertension         Current Outpatient Medications on File Prior to Visit   Medication Sig Dispense Refill    zolpidem (AMBIEN) 5 mg tablet TAKE 1 TABLET BY MOUTH AT BEDTIME AS NEEDED  15 Tablet 0    ezetimibe (ZETIA) 10 mg tablet Take 1 Tablet by mouth daily for 90 doses. 90 Tablet 2    diclofenac EC (VOLTAREN) 75 mg EC tablet TAKE 1 TABLET TWICE A  Tablet 3    amLODIPine (NORVASC) 2.5 mg tablet TAKE 1 TABLET DAILY 90 Tablet 3    lisinopril-hydroCHLOROthiazide (PRINZIDE, ZESTORETIC) 20-12.5 mg per tablet TAKE 1 TABLET TWICE A DAY 90 Tablet 3    levothyroxine (SYNTHROID) 50 mcg tablet TAKE 1 TABLET DAILY 90 Tab 3    dicyclomine (BENTYL) 10 mg capsule Take 10 mg by mouth four (4) times daily as needed.       rOPINIRole (REQUIP) 0.5 mg tablet 1 to 2 tablets hs prn 180 Tab 3    oxyCODONE-acetaminophen (PERCOCET) 5-325 mg per tablet Take by mouth every four (4) hours as needed for Pain. No current facility-administered medications on file prior to visit. ROS systems reviewed and negative  Visit Vitals  Resp 20   Ht 5' 3\" (1.6 m)   Wt 151 lb 12.8 oz (68.9 kg)   BMI 26.89 kg/m²        Physical Exam well-developed  woman alert and oriented no acute distress normocephalic conjunctiva pink neck supple no lymphadenopathy lungs bilaterally clear to auscultation heart rhythm regular no murmurs or extrasystoles extremities benign    Assessment & Plan do not see any impetus to change anything today.   I would like to see her back routinely        Mariela Lui MD

## 2021-10-19 DIAGNOSIS — M79.661 PAIN IN BOTH LOWER LEGS: ICD-10-CM

## 2021-10-19 DIAGNOSIS — M79.662 PAIN IN BOTH LOWER LEGS: ICD-10-CM

## 2021-10-19 RX ORDER — TRAMADOL HYDROCHLORIDE 50 MG/1
TABLET ORAL
Qty: 40 TABLET | Refills: 0 | Status: SHIPPED | OUTPATIENT
Start: 2021-10-19 | End: 2021-11-24

## 2021-11-23 DIAGNOSIS — M79.662 PAIN IN BOTH LOWER LEGS: ICD-10-CM

## 2021-11-23 DIAGNOSIS — M79.661 PAIN IN BOTH LOWER LEGS: ICD-10-CM

## 2021-11-24 RX ORDER — TRAMADOL HYDROCHLORIDE 50 MG/1
TABLET ORAL
Qty: 40 TABLET | Refills: 0 | Status: SHIPPED | OUTPATIENT
Start: 2021-11-24 | End: 2022-01-12 | Stop reason: SDUPTHER

## 2021-12-01 ENCOUNTER — ANESTHESIA EVENT (OUTPATIENT)
Dept: SURGERY | Age: 71
End: 2021-12-01
Payer: MEDICARE

## 2021-12-01 RX ORDER — SODIUM CHLORIDE 0.9 % (FLUSH) 0.9 %
5-40 SYRINGE (ML) INJECTION EVERY 8 HOURS
Status: CANCELLED | OUTPATIENT
Start: 2021-12-01

## 2021-12-01 RX ORDER — SODIUM CHLORIDE 0.9 % (FLUSH) 0.9 %
5-40 SYRINGE (ML) INJECTION AS NEEDED
Status: CANCELLED | OUTPATIENT
Start: 2021-12-01

## 2021-12-01 RX ORDER — SODIUM CHLORIDE, SODIUM LACTATE, POTASSIUM CHLORIDE, CALCIUM CHLORIDE 600; 310; 30; 20 MG/100ML; MG/100ML; MG/100ML; MG/100ML
25 INJECTION, SOLUTION INTRAVENOUS CONTINUOUS
Status: CANCELLED | OUTPATIENT
Start: 2021-12-01 | End: 2021-12-02

## 2021-12-01 RX ORDER — INSULIN LISPRO 100 [IU]/ML
INJECTION, SOLUTION INTRAVENOUS; SUBCUTANEOUS ONCE
Status: CANCELLED | OUTPATIENT
Start: 2021-12-01 | End: 2021-12-01

## 2021-12-08 ENCOUNTER — ANESTHESIA (OUTPATIENT)
Dept: SURGERY | Age: 71
End: 2021-12-08
Payer: MEDICARE

## 2021-12-08 ENCOUNTER — HOSPITAL ENCOUNTER (OUTPATIENT)
Age: 71
Setting detail: OUTPATIENT SURGERY
Discharge: HOME | End: 2021-12-08
Attending: OPHTHALMOLOGY | Admitting: OPHTHALMOLOGY
Payer: MEDICARE

## 2021-12-08 VITALS
BODY MASS INDEX: 26.58 KG/M2 | WEIGHT: 150 LBS | TEMPERATURE: 97.1 F | HEIGHT: 63 IN | SYSTOLIC BLOOD PRESSURE: 154 MMHG | OXYGEN SATURATION: 100 % | RESPIRATION RATE: 20 BRPM | HEART RATE: 54 BPM | DIASTOLIC BLOOD PRESSURE: 65 MMHG

## 2021-12-08 PROCEDURE — 76060000031 HC ANESTHESIA FIRST 0.5 HR: Performed by: OPHTHALMOLOGY

## 2021-12-08 PROCEDURE — 76210000020 HC REC RM PH II FIRST 0.5 HR: Performed by: OPHTHALMOLOGY

## 2021-12-08 PROCEDURE — 2709999900 HC NON-CHARGEABLE SUPPLY: Performed by: OPHTHALMOLOGY

## 2021-12-08 PROCEDURE — 74011250636 HC RX REV CODE- 250/636: Performed by: NURSE ANESTHETIST, CERTIFIED REGISTERED

## 2021-12-08 PROCEDURE — 74011250636 HC RX REV CODE- 250/636: Performed by: OPHTHALMOLOGY

## 2021-12-08 PROCEDURE — 74011000250 HC RX REV CODE- 250: Performed by: OPHTHALMOLOGY

## 2021-12-08 PROCEDURE — V2632 POST CHMBR INTRAOCULAR LENS: HCPCS | Performed by: OPHTHALMOLOGY

## 2021-12-08 PROCEDURE — 76010000154 HC OR TIME FIRST 0.5 HR: Performed by: OPHTHALMOLOGY

## 2021-12-08 DEVICE — LENS ACRYL ASPHEROC 1PC 20.5D -- TECNIS ZCB00: Type: IMPLANTABLE DEVICE | Site: EYE | Status: FUNCTIONAL

## 2021-12-08 RX ORDER — EPINEPHRINE 1 MG/ML
INJECTION, SOLUTION, CONCENTRATE INTRAVENOUS AS NEEDED
Status: DISCONTINUED | OUTPATIENT
Start: 2021-12-08 | End: 2021-12-08 | Stop reason: HOSPADM

## 2021-12-08 RX ORDER — SODIUM CHLORIDE 0.9 % (FLUSH) 0.9 %
5-40 SYRINGE (ML) INJECTION AS NEEDED
Status: CANCELLED | OUTPATIENT
Start: 2021-12-08

## 2021-12-08 RX ORDER — LIDOCAINE HYDROCHLORIDE 10 MG/ML
INJECTION, SOLUTION EPIDURAL; INFILTRATION; INTRACAUDAL; PERINEURAL AS NEEDED
Status: DISCONTINUED | OUTPATIENT
Start: 2021-12-08 | End: 2021-12-08 | Stop reason: HOSPADM

## 2021-12-08 RX ORDER — SODIUM CHLORIDE 0.9 % (FLUSH) 0.9 %
5-40 SYRINGE (ML) INJECTION AS NEEDED
Status: DISCONTINUED | OUTPATIENT
Start: 2021-12-08 | End: 2021-12-08 | Stop reason: HOSPADM

## 2021-12-08 RX ORDER — TETRACAINE HYDROCHLORIDE 5 MG/ML
SOLUTION OPHTHALMIC AS NEEDED
Status: DISCONTINUED | OUTPATIENT
Start: 2021-12-08 | End: 2021-12-08 | Stop reason: HOSPADM

## 2021-12-08 RX ORDER — MOXIFLOXACIN 5 MG/ML
SOLUTION/ DROPS OPHTHALMIC AS NEEDED
Status: DISCONTINUED | OUTPATIENT
Start: 2021-12-08 | End: 2021-12-08 | Stop reason: HOSPADM

## 2021-12-08 RX ORDER — MIDAZOLAM HYDROCHLORIDE 1 MG/ML
INJECTION, SOLUTION INTRAMUSCULAR; INTRAVENOUS AS NEEDED
Status: DISCONTINUED | OUTPATIENT
Start: 2021-12-08 | End: 2021-12-08 | Stop reason: HOSPADM

## 2021-12-08 RX ORDER — SODIUM CHLORIDE 0.9 % (FLUSH) 0.9 %
5-40 SYRINGE (ML) INJECTION EVERY 8 HOURS
Status: DISCONTINUED | OUTPATIENT
Start: 2021-12-08 | End: 2021-12-08 | Stop reason: HOSPADM

## 2021-12-08 RX ORDER — ONDANSETRON 2 MG/ML
4 INJECTION INTRAMUSCULAR; INTRAVENOUS ONCE
Status: DISCONTINUED | OUTPATIENT
Start: 2021-12-08 | End: 2021-12-08 | Stop reason: HOSPADM

## 2021-12-08 RX ADMIN — MIDAZOLAM 2 MG: 1 INJECTION INTRAMUSCULAR; INTRAVENOUS at 11:18

## 2021-12-08 NOTE — ANESTHESIA PREPROCEDURE EVALUATION
Relevant Problems   CARDIOVASCULAR   (+) Essential hypertension      ENDOCRINE   (+) Hypothyroidism       Anesthetic History   No history of anesthetic complications            Review of Systems / Medical History  Patient summary reviewed, nursing notes reviewed and pertinent labs reviewed    Pulmonary  Within defined limits                 Neuro/Psych   Within defined limits           Cardiovascular    Hypertension              Exercise tolerance: >4 METS     GI/Hepatic/Renal  Within defined limits              Endo/Other      Hypothyroidism  Arthritis     Other Findings              Physical Exam    Airway  Mallampati: II  TM Distance: 4 - 6 cm  Neck ROM: normal range of motion   Mouth opening: Normal     Cardiovascular  Regular rate and rhythm,  S1 and S2 normal,  no murmur, click, rub, or gallop  Rhythm: regular  Rate: normal         Dental  No notable dental hx       Pulmonary  Breath sounds clear to auscultation               Abdominal  GI exam deferred       Other Findings            Anesthetic Plan    ASA: 2  Anesthesia type: MAC          Induction: Intravenous  Anesthetic plan and risks discussed with: Patient

## 2021-12-08 NOTE — OP NOTES
OPERATIVE REPORT     PATIENT INFORMATION:    Patient: Calvin Saeed MRN: [id number]  SSN: [id number]    YOB: 1950  Age: 73 y.o. Sex: female        LOCATION OF SURGERY: [address] OF SURGERY:  12/08/21      PRE-OPERATIVE DIAGNOSIS: Cataract Right eye. POST OPERATIVE DIAGNOSIS: Cataract Right eye. PROCEDURE PERFORMED: Phacoemulsification with intraocular lens Right eye. SURGEON: Bryan Carter M.D. ANESTHESIA: Monitored anesthesia. COMPLICATIONS: None. INDICATIONS FOR PROCEDURE:   This is a 73y.o. year old female with progressively decreasing vision in the right eye. Risks, benefits and alternatives of surgery were explained at length with the patient and informed consent was obtained. PROCEDURE:   The patient was met in the pre-operative holding area where confirmation was made that the right eye was to be operated on and surgical eye was marked. The patient was taken to the operating room where anesthesia staff was present to give temporary sedation. Following the instillation of topical tetracaine drops to the operative eye the patient was then prepped and draped in the usual sterile fashion for ophthalmic surgery. The lid speculum was placed and the lids were retracted. Imelda [place] was placed in the inferior puncta following punctal dilation. A paracentesis was made through the clear cornea, shugarcaine was injected in the eye for improved dilation. The anterior chamber was deepened with viscoelastic material. A 2.4 mm keratome was used to make a self-sealing clear corneal incision. Capsulorrhexis was initiated with a cystotome and completed with Utrata forceps without difficulty. Hydrodissection was completed and good fluid wave was visualized. Phacoemulsification was initiated and completed in a divide and conquer fashion without difficulty.  Irrigation and aspiration was used to remove the residual cortical material from the capsular bag and then the capsular bag and the anterior chamber were deepened with viscoelastic material. The bag was inspected and found to be free of any tears or defects. A DCB00 20.5 Diopter intraocular lens was placed into the capsular bag with good centration. Irrigation and aspiration was used to remove the residual viscoelastic material from the capsular bag and the anterior chamber and the incision sites were hydrated the BSS and cannula. The incisions were rehydrated and inspected with a weck-cristal sponge and were found to be water tight. The eye was inspected and the anterior chamber was deep, the pupil round, and the lens was in good position. The lid speculum was then removed under visualization. Several drops of antibiotic eye drops were instilled into patient's operated eye. The operated eye was then covered with an eyeshield. The patient tolerated the procedure well and was transferred to recovery in good condition.         Micaela Bazan MD  87/19/2606:94 PM

## 2021-12-08 NOTE — ANESTHESIA POSTPROCEDURE EVALUATION
Procedure(s):  PHACO WITH IOL OD. MAC    Anesthesia Post Evaluation      Multimodal analgesia: multimodal analgesia used between 6 hours prior to anesthesia start to PACU discharge  Patient location during evaluation: bedside  Patient participation: complete - patient participated  Level of consciousness: awake and alert  Pain score: 0  Pain management: adequate  Airway patency: patent  Anesthetic complications: no  Cardiovascular status: acceptable and stable  Respiratory status: acceptable and room air  Hydration status: acceptable  Comments: Ok to discharge when post op criteria met. Post anesthesia nausea and vomiting:  none  Final Post Anesthesia Temperature Assessment:  Normothermia (36.0-37.5 degrees C)      INITIAL Post-op Vital signs: No vitals data found for the desired time range.

## 2021-12-08 NOTE — H&P
SEE original H&P Scanned into chart. Date of Surgery Update:  Davey Rivera was seen and examined. History and physical has been reviewed. The patient has been examined.  There have been no significant clinical changes since the completion of the originally dated History and Physical.     Signed By: Rebekah Gillette MD   12/08/21, 10:31 AM

## 2021-12-08 NOTE — DISCHARGE INSTRUCTIONS
POST-OPERATIVE INSTRUCTIONS FOR CATARACT SURGERY     1. No heavy lifting (no more than 5 pounds). No Bending at waist and No strenuous activity for one (1) week. 2. DO NOT RUB YOUR EYE!!! Wear eye protection at all times when going outdoors (glasses, sunglasses,        ect) and wear shield while sleeping/napping for the first week after surgery. 3. DO NOT GET WATER IN YOUR EYES FOR THE NEXT 3 DAYS. You may gently clean your face and you        may shower, but don't put any pressure on the eye. Ladies, no eye makeup for one (1) week after surgery. Do not swim or get into a hot tub for three (3) weeks. 4. You may experience eye irritation, aching, itching and blurred vision for several days. Use Tylenol for         Discomfort but DO NOT RUB YOUR EYE . 5. Call your doctor with any increased pain, redness, nausea, vomiting, or worsening vision. Also call your doctor        with new flashes of light, many new floaters or a curtain/veil coming into your vision.                                                8 Nicci Esquivelidi YOUR HANDS BEFORE PUTTING IN YOUR DROPS                                       ALLOW 5 MINUTES BETWEEN DIFFERENT DROPS                       IF YOU HAVE ANY QUESTION OR CONCERNS DURING OFFICE HOUR                      CALL [phone] OR [phone] AFTER HOURS OR ON WEEKENDS

## 2021-12-28 RX ORDER — LISINOPRIL AND HYDROCHLOROTHIAZIDE 12.5; 2 MG/1; MG/1
TABLET ORAL
Qty: 90 TABLET | Refills: 3 | Status: SHIPPED | OUTPATIENT
Start: 2021-12-28

## 2022-01-03 ENCOUNTER — ANESTHESIA EVENT (OUTPATIENT)
Dept: SURGERY | Age: 72
End: 2022-01-03
Payer: MEDICARE

## 2022-01-03 RX ORDER — MAGNESIUM SULFATE 100 %
4 CRYSTALS MISCELLANEOUS AS NEEDED
Status: CANCELLED | OUTPATIENT
Start: 2022-01-03

## 2022-01-03 RX ORDER — DEXTROSE 50 % IN WATER (D50W) INTRAVENOUS SYRINGE
25-50 AS NEEDED
Status: CANCELLED | OUTPATIENT
Start: 2022-01-03

## 2022-01-12 ENCOUNTER — HOSPITAL ENCOUNTER (OUTPATIENT)
Age: 72
Discharge: HOME | End: 2022-01-12
Attending: OPHTHALMOLOGY | Admitting: OPHTHALMOLOGY
Payer: MEDICARE

## 2022-01-12 ENCOUNTER — ANESTHESIA (OUTPATIENT)
Dept: SURGERY | Age: 72
End: 2022-01-12
Payer: MEDICARE

## 2022-01-12 VITALS
BODY MASS INDEX: 26.58 KG/M2 | RESPIRATION RATE: 15 BRPM | HEIGHT: 63 IN | TEMPERATURE: 98 F | HEART RATE: 55 BPM | DIASTOLIC BLOOD PRESSURE: 76 MMHG | OXYGEN SATURATION: 100 % | SYSTOLIC BLOOD PRESSURE: 140 MMHG | WEIGHT: 150 LBS

## 2022-01-12 DIAGNOSIS — M79.662 PAIN IN BOTH LOWER LEGS: ICD-10-CM

## 2022-01-12 DIAGNOSIS — M79.661 PAIN IN BOTH LOWER LEGS: ICD-10-CM

## 2022-01-12 PROBLEM — H26.9 CATARACT: Status: ACTIVE | Noted: 2022-01-12

## 2022-01-12 PROCEDURE — 74011000250 HC RX REV CODE- 250: Performed by: OPHTHALMOLOGY

## 2022-01-12 PROCEDURE — V2632 POST CHMBR INTRAOCULAR LENS: HCPCS | Performed by: OPHTHALMOLOGY

## 2022-01-12 PROCEDURE — 74011250636 HC RX REV CODE- 250/636: Performed by: NURSE ANESTHETIST, CERTIFIED REGISTERED

## 2022-01-12 PROCEDURE — 76060000031 HC ANESTHESIA FIRST 0.5 HR: Performed by: OPHTHALMOLOGY

## 2022-01-12 PROCEDURE — 74011250636 HC RX REV CODE- 250/636: Performed by: OPHTHALMOLOGY

## 2022-01-12 PROCEDURE — 76210000020 HC REC RM PH II FIRST 0.5 HR: Performed by: OPHTHALMOLOGY

## 2022-01-12 PROCEDURE — 2709999900 HC NON-CHARGEABLE SUPPLY: Performed by: OPHTHALMOLOGY

## 2022-01-12 PROCEDURE — 76010000154 HC OR TIME FIRST 0.5 HR: Performed by: OPHTHALMOLOGY

## 2022-01-12 PROCEDURE — 74011000636 HC RX REV CODE- 636: Performed by: OPHTHALMOLOGY

## 2022-01-12 DEVICE — LENS ACRYL ASPHEROC 1PC 20.5D -- TECNIS ZCB00: Type: IMPLANTABLE DEVICE | Site: EYE | Status: FUNCTIONAL

## 2022-01-12 RX ORDER — TETRACAINE HYDROCHLORIDE 5 MG/ML
SOLUTION OPHTHALMIC AS NEEDED
Status: DISCONTINUED | OUTPATIENT
Start: 2022-01-12 | End: 2022-01-12 | Stop reason: HOSPADM

## 2022-01-12 RX ORDER — MIDAZOLAM HYDROCHLORIDE 1 MG/ML
INJECTION, SOLUTION INTRAMUSCULAR; INTRAVENOUS AS NEEDED
Status: DISCONTINUED | OUTPATIENT
Start: 2022-01-12 | End: 2022-01-12 | Stop reason: HOSPADM

## 2022-01-12 RX ORDER — MOXIFLOXACIN 5 MG/ML
SOLUTION/ DROPS OPHTHALMIC AS NEEDED
Status: DISCONTINUED | OUTPATIENT
Start: 2022-01-12 | End: 2022-01-12 | Stop reason: HOSPADM

## 2022-01-12 RX ORDER — LIDOCAINE HYDROCHLORIDE 10 MG/ML
INJECTION, SOLUTION EPIDURAL; INFILTRATION; INTRACAUDAL; PERINEURAL AS NEEDED
Status: DISCONTINUED | OUTPATIENT
Start: 2022-01-12 | End: 2022-01-12 | Stop reason: HOSPADM

## 2022-01-12 RX ORDER — SODIUM CHLORIDE 0.9 % (FLUSH) 0.9 %
5-40 SYRINGE (ML) INJECTION AS NEEDED
Status: DISCONTINUED | OUTPATIENT
Start: 2022-01-12 | End: 2022-01-12 | Stop reason: HOSPADM

## 2022-01-12 RX ORDER — SODIUM CHLORIDE, SODIUM LACTATE, POTASSIUM CHLORIDE, CALCIUM CHLORIDE 600; 310; 30; 20 MG/100ML; MG/100ML; MG/100ML; MG/100ML
25 INJECTION, SOLUTION INTRAVENOUS CONTINUOUS
Status: DISCONTINUED | OUTPATIENT
Start: 2022-01-12 | End: 2022-01-12 | Stop reason: HOSPADM

## 2022-01-12 RX ORDER — TRAMADOL HYDROCHLORIDE 50 MG/1
50 TABLET ORAL
Qty: 40 TABLET | Refills: 0 | Status: SHIPPED | OUTPATIENT
Start: 2022-01-12 | End: 2022-01-22

## 2022-01-12 RX ADMIN — MIDAZOLAM HYDROCHLORIDE 2 MG: 2 INJECTION, SOLUTION INTRAMUSCULAR; INTRAVENOUS at 09:02

## 2022-01-12 RX ADMIN — Medication 1 EACH: at 09:00

## 2022-01-12 NOTE — DISCHARGE INSTRUCTIONS
POST-OPERATIVE INSTRUCTIONS FOR CATARACT SURGERY     1. No heavy lifting (no more than 5 pounds). No Bending at waist and No strenuous activity for one (1) week. 2. DO NOT RUB YOUR EYE!!! Wear eye protection at all times when going outdoors (glasses, sunglasses,        ect) and wear shield while sleeping/napping for the first week after surgery. 3. DO NOT GET WATER IN YOUR EYES FOR THE NEXT 3 DAYS. You may gently clean your face and you        may shower, but don't put any pressure on the eye. Ladies, no eye makeup for one (1) week after surgery. Do not swim or get into a hot tub for three (3) weeks. 4. You may experience eye irritation, aching, itching and blurred vision for several days. Use Tylenol for         Discomfort but DO NOT RUB YOUR EYE . 5. Call your doctor with any increased pain, redness, nausea, vomiting, or worsening vision. Also call your doctor        with new flashes of light, many new floaters or a curtain/veil coming into your vision.                                                8 Nicci Esquivelidi YOUR HANDS BEFORE PUTTING IN YOUR DROPS                                       ALLOW 5 MINUTES BETWEEN DIFFERENT DROPS                       IF YOU HAVE ANY QUESTION OR CONCERNS DURING OFFICE HOUR                      CALL [phone] OR [phone] AFTER HOURS OR ON WEEKENDS

## 2022-01-12 NOTE — ANESTHESIA POSTPROCEDURE EVALUATION
Procedure(s):  PHACO WITH IOL OS.     MAC    Anesthesia Post Evaluation      Multimodal analgesia: multimodal analgesia not used between 6 hours prior to anesthesia start to PACU discharge  Patient location during evaluation: bedside  Patient participation: complete - patient participated  Level of consciousness: awake and alert  Pain management: adequate  Airway patency: patent  Anesthetic complications: no  Cardiovascular status: acceptable and stable  Respiratory status: acceptable, spontaneous ventilation and room air  Hydration status: acceptable  Comments: Ok to discharge when post op criteria met  Post anesthesia nausea and vomiting:  none  Final Post Anesthesia Temperature Assessment:  Normothermia (36.0-37.5 degrees C)      INITIAL Post-op Vital signs:   Vitals Value Taken Time   /68 01/12/22 0920   Temp 36.7 °C (98 °F) 01/12/22 0920   Pulse 87 01/12/22 0920   Resp 16 01/12/22 0920   SpO2 99 % 01/12/22 0920

## 2022-01-12 NOTE — H&P
SEE original H&P Scanned into chart. Date of Surgery Update:  Joselito Keating was seen and examined. History and physical has been reviewed. The patient has been examined.  There have been no significant clinical changes since the completion of the originally dated History and Physical.     Signed By: Sandip Negro MD   01/12/22, 8:55 AM

## 2022-01-12 NOTE — ANESTHESIA PREPROCEDURE EVALUATION
Relevant Problems   No relevant active problems       Anesthetic History   No history of anesthetic complications            Review of Systems / Medical History  Patient summary reviewed, nursing notes reviewed and pertinent labs reviewed    Pulmonary  Within defined limits                 Neuro/Psych   Within defined limits          Comments: Pt denies any hx of fibromyalgia Cardiovascular    Hypertension              Exercise tolerance: >4 METS     GI/Hepatic/Renal  Within defined limits              Endo/Other      Hypothyroidism  Arthritis     Other Findings              Physical Exam    Airway  Mallampati: II  TM Distance: 4 - 6 cm  Neck ROM: normal range of motion   Mouth opening: Normal     Cardiovascular  Regular rate and rhythm,  S1 and S2 normal,  no murmur, click, rub, or gallop  Rhythm: regular  Rate: normal         Dental  No notable dental hx       Pulmonary  Breath sounds clear to auscultation               Abdominal  GI exam deferred       Other Findings            Anesthetic Plan    ASA: 2  Anesthesia type: MAC          Induction: Intravenous  Anesthetic plan and risks discussed with: Patient

## 2022-01-12 NOTE — OP NOTES
OPERATIVE REPORT     PATIENT INFORMATION:    Patient: Lucas Huggins MRN: [id number]  SSN: [id number]    YOB: 1950  Age: 73 y.o. Sex: female        LOCATION OF SURGERY: [address] SURGERY:  01/12/22      PRE-OPERATIVE DIAGNOSIS: Cataract Left eye. POST OPERATIVE DIAGNOSIS: Cataract Left eye. PROCEDURE PERFORMED: Phacoemulsification with intraocular lens Left eye. SURGEON: Eva Garcia M.D. ANESTHESIA: Monitored anesthesia. COMPLICATIONS: None. INDICATIONS FOR PROCEDURE:   This is a 73y.o. year old female with progressively decreasing vision in the left eye. Risks, benefits and alternatives of surgery were explained at length with the patient and informed consent was obtained. PROCEDURE:   The patient was met in the pre-operative holding area where confirmation was made that the left eye was to be operated on and surgical eye was marked. The patient was taken to the operating room where anesthesia staff was present to give temporary sedation. Following the instillation of topical tetracaine drops to the operative eye the patient was then prepped and draped in the usual sterile fashion for ophthalmic surgery. The lid speculum was placed and the lids were retracted. Kody Ego was placed in the inferior puncta following punctal dilation. A paracentesis was made through the clear cornea, shugarcaine was injected in the eye for improved dilation. The anterior chamber was deepened with viscoelastic material. A 2.4 mm keratome was used to make a self-sealing clear corneal incision. Capsulorrhexis was initiated with a cystotome and completed with Utrata forceps without difficulty. Hydrodissection was completed and good fluid wave was visualized. Phacoemulsification was initiated and completed in a divide and conquer fashion without difficulty.  Irrigation and aspiration was used to remove the residual cortical material from the capsular bag and then the capsular bag and the anterior chamber were deepened with viscoelastic material. The bag was inspected and found to be free of any tears or defects. A DCB00 20.5 Diopter intraocular lens was placed into the capsular bag with good centration. Irrigation and aspiration was used to remove the residual viscoelastic material from the capsular bag and the anterior chamber and the incision sites were hydrated the BSS and cannula. The incisions were rehydrated and inspected with a weck-cristal sponge and were found to be water tight. The eye was inspected and the anterior chamber was deep, the pupil round, and the lens was in good position. The lid speculum was then removed under visualization. Several drops of antibiotic eye drops were instilled into patient's operated eye. The operated eye was then covered with an eyeshield. The patient tolerated the procedure well and was transferred to recovery in good condition. Pebbles Mcgrath was used throughout the case in the BSS irrigation bottle.       Willie Ruiz MD  01/12/221:39 PM

## 2022-01-28 ENCOUNTER — TRANSCRIBE ORDER (OUTPATIENT)
Dept: SCHEDULING | Age: 72
End: 2022-01-28

## 2022-01-28 DIAGNOSIS — I34.1 MITRAL VALVE PROLAPSE: Primary | ICD-10-CM

## 2022-02-11 ENCOUNTER — HOSPITAL ENCOUNTER (OUTPATIENT)
Dept: NON INVASIVE DIAGNOSTICS | Age: 72
Discharge: HOME | End: 2022-02-11
Attending: INTERNAL MEDICINE
Payer: MEDICARE

## 2022-02-11 VITALS
BODY MASS INDEX: 26.58 KG/M2 | HEIGHT: 63 IN | SYSTOLIC BLOOD PRESSURE: 146 MMHG | WEIGHT: 150 LBS | DIASTOLIC BLOOD PRESSURE: 67 MMHG

## 2022-02-11 DIAGNOSIS — I34.1 MITRAL VALVE PROLAPSE: ICD-10-CM

## 2022-02-11 LAB
ECHO AO ASC DIAM: 3.3 CM
ECHO AO ASCENDING AORTA INDEX: 1.93 CM/M2
ECHO AO ROOT DIAM: 3.4 CM
ECHO AO ROOT INDEX: 1.99 CM/M2
ECHO AV AREA PEAK VELOCITY: 1.5 CM2
ECHO AV AREA PLAN/BSA: 0.99 CM2/M2
ECHO AV AREA PLAN: 1.7 CM2
ECHO AV AREA VTI: 1.5 CM2
ECHO AV AREA/BSA PEAK VELOCITY: 0.9 CM2/M2
ECHO AV AREA/BSA VTI: 0.9 CM2/M2
ECHO AV MEAN GRADIENT: 13 MMHG
ECHO AV MEAN VELOCITY: 1.7 M/S
ECHO AV PEAK GRADIENT: 22 MMHG
ECHO AV PEAK VELOCITY: 2.4 M/S
ECHO AV VELOCITY RATIO: 0.46
ECHO AV VTI: 52.9 CM
ECHO LA AREA 2C: 17.5 CM2
ECHO LA AREA 4C: 19.2 CM2
ECHO LA DIAMETER INDEX: 2.22 CM/M2
ECHO LA DIAMETER: 3.8 CM
ECHO LA MAJOR AXIS: 5.6 CM
ECHO LA MINOR AXIS: 5.1 CM
ECHO LA TO AORTIC ROOT RATIO: 1.12
ECHO LA VOL BP: 54 ML (ref 22–52)
ECHO LA VOL/BSA BIPLANE: 32 ML/M2 (ref 16–34)
ECHO LV E' LATERAL VELOCITY: 8 CM/S
ECHO LV E' SEPTAL VELOCITY: 8 CM/S
ECHO LV EDV A4C: 73 ML
ECHO LV EDV INDEX A4C: 43 ML/M2
ECHO LV EJECTION FRACTION A4C: 67 %
ECHO LV EJECTION FRACTION BIPLANE: 65 % (ref 55–100)
ECHO LV ESV A4C: 24 ML
ECHO LV ESV INDEX A4C: 14 ML/M2
ECHO LV FRACTIONAL SHORTENING: 33 % (ref 28–44)
ECHO LV GLOBAL CIRCUMFRENTIAL STRAIN (GCS): -19 %
ECHO LV INTERNAL DIMENSION DIASTOLE INDEX: 2.46 CM/M2
ECHO LV INTERNAL DIMENSION DIASTOLIC: 4.2 CM (ref 3.9–5.3)
ECHO LV INTERNAL DIMENSION SYSTOLIC INDEX: 1.64 CM/M2
ECHO LV INTERNAL DIMENSION SYSTOLIC: 2.8 CM
ECHO LV IVSD: 1.1 CM (ref 0.6–0.9)
ECHO LV MASS 2D: 157.1 G (ref 67–162)
ECHO LV MASS INDEX 2D: 91.8 G/M2 (ref 43–95)
ECHO LV POSTERIOR WALL DIASTOLIC: 1.1 CM (ref 0.6–0.9)
ECHO LV RELATIVE WALL THICKNESS RATIO: 0.52
ECHO LVOT AREA: 3.1 CM2
ECHO LVOT AV VTI INDEX: 0.49
ECHO LVOT DIAM: 2 CM
ECHO LVOT MEAN GRADIENT: 3 MMHG
ECHO LVOT PEAK GRADIENT: 5 MMHG
ECHO LVOT PEAK VELOCITY: 1.1 M/S
ECHO LVOT STROKE VOLUME INDEX: 47.2 ML/M2
ECHO LVOT SV: 80.7 ML
ECHO LVOT VTI: 25.7 CM
ECHO MV A VELOCITY: 1.08 M/S
ECHO MV AREA VTI: 2.7 CM2
ECHO MV E DECELERATION TIME (DT): 343 MS
ECHO MV E VELOCITY: 0.81 M/S
ECHO MV E/A RATIO: 0.75
ECHO MV E/E' LATERAL: 10.13
ECHO MV E/E' RATIO (AVERAGED): 10.13
ECHO MV E/E' SEPTAL: 10.13
ECHO MV LVOT VTI INDEX: 1.15
ECHO MV MAX VELOCITY: 1 M/S
ECHO MV MEAN GRADIENT: 1 MMHG
ECHO MV MEAN VELOCITY: 0.5 M/S
ECHO MV PEAK GRADIENT: 4 MMHG
ECHO MV VTI: 29.5 CM
ECHO PULMONARY ARTERY END DIASTOLIC PRESSURE: 3 MMHG
ECHO PULMONARY ARTERY END DIASTOLIC PRESSURE: 7 MMHG
ECHO PV MAX VELOCITY: 1 M/S
ECHO PV MAX VELOCITY: 1.4 M/S
ECHO PV PEAK GRADIENT: 4 MMHG
ECHO PV REGURGITANT MAX VELOCITY: 0.9 M/S
ECHO RA AREA 4C: 13 CM2
ECHO RV BASAL DIMENSION: 3 CM
ECHO RV LONGITUDINAL DIMENSION: 6 CM
ECHO RV MID DIMENSION: 2.4 CM
ECHO RV TAPSE: 2 CM (ref 1.5–2)

## 2022-02-11 PROCEDURE — 93306 TTE W/DOPPLER COMPLETE: CPT

## 2022-03-18 PROBLEM — H26.9 CATARACT: Status: ACTIVE | Noted: 2022-01-12

## 2022-03-18 PROBLEM — M79.7 FIBROMYALGIA: Status: ACTIVE | Noted: 2018-03-28

## 2022-04-21 ENCOUNTER — TRANSCRIBE ORDER (OUTPATIENT)
Dept: SCHEDULING | Age: 72
End: 2022-04-21

## 2022-04-21 DIAGNOSIS — Z86.16 PERSONAL HISTORY OF COVID-19: ICD-10-CM

## 2022-04-21 DIAGNOSIS — R00.2 PALPITATIONS: ICD-10-CM

## 2022-04-21 DIAGNOSIS — I48.19 ATRIAL FIBRILLATION, PERSISTENT (HCC): ICD-10-CM

## 2022-04-21 DIAGNOSIS — R55 NEAR SYNCOPE: Primary | ICD-10-CM

## 2022-04-26 ENCOUNTER — HOSPITAL ENCOUNTER (OUTPATIENT)
Dept: NUCLEAR MEDICINE | Age: 72
Discharge: HOME | End: 2022-04-26
Attending: INTERNAL MEDICINE
Payer: MEDICARE

## 2022-04-26 ENCOUNTER — HOSPITAL ENCOUNTER (OUTPATIENT)
Dept: NON INVASIVE DIAGNOSTICS | Age: 72
Discharge: HOME | End: 2022-04-26
Attending: INTERNAL MEDICINE
Payer: MEDICARE

## 2022-04-26 VITALS
DIASTOLIC BLOOD PRESSURE: 54 MMHG | SYSTOLIC BLOOD PRESSURE: 153 MMHG | BODY MASS INDEX: 26.58 KG/M2 | HEART RATE: 78 BPM | WEIGHT: 150 LBS | HEIGHT: 63 IN

## 2022-04-26 DIAGNOSIS — Z86.16 PERSONAL HISTORY OF COVID-19: ICD-10-CM

## 2022-04-26 DIAGNOSIS — R00.2 PALPITATIONS: ICD-10-CM

## 2022-04-26 DIAGNOSIS — I48.19 ATRIAL FIBRILLATION, PERSISTENT (HCC): ICD-10-CM

## 2022-04-26 DIAGNOSIS — R55 NEAR SYNCOPE: ICD-10-CM

## 2022-04-26 LAB
NUC STRESS EJECTION FRACTION: 75 %
STRESS BASELINE HR: 56 BPM
STRESS BASELINE ST DEPRESSION: 0 MM
STRESS ESTIMATED WORKLOAD: 1 METS
STRESS EXERCISE DUR MIN: 4 MIN
STRESS EXERCISE DUR SEC: 0 SEC
STRESS PEAK DIAS BP: 61 MMHG
STRESS PEAK SYS BP: 169 MMHG
STRESS PERCENT HR ACHIEVED: 70 %
STRESS POST PEAK HR: 105 BPM
STRESS RATE PRESSURE PRODUCT: NORMAL BPM*MMHG
STRESS ST DEPRESSION: 1 MM
STRESS TARGET HR: 151 BPM

## 2022-04-26 PROCEDURE — 74011250636 HC RX REV CODE- 250/636: Performed by: INTERNAL MEDICINE

## 2022-04-26 PROCEDURE — A9500 TC99M SESTAMIBI: HCPCS

## 2022-04-26 PROCEDURE — 93226 XTRNL ECG REC<48 HR SCAN A/R: CPT

## 2022-04-26 PROCEDURE — 93017 CV STRESS TEST TRACING ONLY: CPT

## 2022-04-26 RX ORDER — TETRAKIS(2-METHOXYISOBUTYLISOCYANIDE)COPPER(I) TETRAFLUOROBORATE 1 MG/ML
33.9 INJECTION, POWDER, LYOPHILIZED, FOR SOLUTION INTRAVENOUS
Status: COMPLETED | OUTPATIENT
Start: 2022-04-26 | End: 2022-04-26

## 2022-04-26 RX ORDER — TETRAKIS(2-METHOXYISOBUTYLISOCYANIDE)COPPER(I) TETRAFLUOROBORATE 1 MG/ML
10.1 INJECTION, POWDER, LYOPHILIZED, FOR SOLUTION INTRAVENOUS
Status: COMPLETED | OUTPATIENT
Start: 2022-04-26 | End: 2022-04-26

## 2022-04-26 RX ADMIN — REGADENOSON 0.4 MG: 0.08 INJECTION, SOLUTION INTRAVENOUS at 08:20

## 2022-04-26 RX ADMIN — TETRAKIS(2-METHOXYISOBUTYLISOCYANIDE)COPPER(I) TETRAFLUOROBORATE 33.9 MILLICURIE: 1 INJECTION, POWDER, LYOPHILIZED, FOR SOLUTION INTRAVENOUS at 10:00

## 2022-04-26 RX ADMIN — TETRAKIS(2-METHOXYISOBUTYLISOCYANIDE)COPPER(I) TETRAFLUOROBORATE 10.1 MILLICURIE: 1 INJECTION, POWDER, LYOPHILIZED, FOR SOLUTION INTRAVENOUS at 07:00

## 2022-09-15 ENCOUNTER — TRANSCRIBE ORDER (OUTPATIENT)
Dept: SCHEDULING | Age: 72
End: 2022-09-15

## 2022-09-15 DIAGNOSIS — R09.89 CAROTID BRUIT: Primary | ICD-10-CM

## 2022-09-15 DIAGNOSIS — I35.9 AORTIC VALVE DISEASE: Primary | ICD-10-CM

## 2022-09-15 DIAGNOSIS — R09.89 CAROTID BRUIT: ICD-10-CM

## 2022-09-15 DIAGNOSIS — I35.1 AORTIC VALVE INSUFFICIENCY: Primary | ICD-10-CM

## 2022-09-30 ENCOUNTER — HOSPITAL ENCOUNTER (OUTPATIENT)
Dept: NON INVASIVE DIAGNOSTICS | Age: 72
Discharge: HOME | End: 2022-09-30
Attending: INTERNAL MEDICINE
Payer: MEDICARE

## 2022-09-30 ENCOUNTER — HOSPITAL ENCOUNTER (OUTPATIENT)
Dept: VASCULAR SURGERY | Age: 72
Discharge: HOME | End: 2022-09-30
Attending: INTERNAL MEDICINE
Payer: MEDICARE

## 2022-09-30 VITALS
BODY MASS INDEX: 26.22 KG/M2 | DIASTOLIC BLOOD PRESSURE: 80 MMHG | HEIGHT: 63 IN | SYSTOLIC BLOOD PRESSURE: 170 MMHG | WEIGHT: 148 LBS

## 2022-09-30 DIAGNOSIS — R09.89 CAROTID BRUIT: ICD-10-CM

## 2022-09-30 DIAGNOSIS — I35.9 AORTIC VALVE DISEASE: ICD-10-CM

## 2022-09-30 LAB
LEFT CCA DIST DIAS: 23.1 CM/S
LEFT CCA DIST SYS: 104 CM/S
LEFT CCA MID DIAS: 25.2 CM/S
LEFT CCA MID SYS: 105 CM/S
LEFT CCA PROX DIAS: 21.7 CM/S
LEFT CCA PROX SYS: 96.7 CM/S
LEFT ECA SYS: 75.8 CM/S
LEFT ICA DIST DIAS: 24.4 CM/S
LEFT ICA DIST SYS: 82.2 CM/S
LEFT ICA MID DIAS: 20.3 CM/S
LEFT ICA MID SYS: 62.3 CM/S
LEFT ICA PROX DIAS: 16.1 CM/S
LEFT ICA PROX SYS: 48.3 CM/S
LEFT ICA/CCA SYS: 0.79
LEFT VERTEBRAL DIAS: 19.3 CM/S
LEFT VERTEBRAL SYS: 65.2 CM/S
RIGHT CCA DIST DIAS: 30.1 CM/S
RIGHT CCA DIST SYS: 107 CM/S
RIGHT CCA MID DIAS: 23.6 CM/S
RIGHT CCA MID SYS: 114 CM/S
RIGHT CCA PROX DIAS: 21.1 CM/S
RIGHT CCA PROX SYS: 100 CM/S
RIGHT ECA SYS: 97.4 CM/S
RIGHT ICA DIST DIAS: 30.9 CM/S
RIGHT ICA DIST SYS: 106 CM/S
RIGHT ICA MID DIAS: 23.8 CM/S
RIGHT ICA MID SYS: 89 CM/S
RIGHT ICA PROX DIAS: 12.6 CM/S
RIGHT ICA PROX SYS: 88.3 CM/S
RIGHT ICA/CCA SYS: 0.99
RIGHT VERTEBRAL DIAS: 18.1 CM/S
RIGHT VERTEBRAL SYS: 73 CM/S
VAS LEFT SUBCLAVIAN MID PSV: 161 CM/S
VAS RIGHT SUBCLAVIAN MID PSV: 132 CM/S

## 2022-09-30 PROCEDURE — 93306 TTE W/DOPPLER COMPLETE: CPT

## 2022-09-30 PROCEDURE — 93880 EXTRACRANIAL BILAT STUDY: CPT

## 2022-10-01 LAB
ECHO AO ASC DIAM: 3.5 CM
ECHO AO ASCENDING AORTA INDEX: 2.06 CM/M2
ECHO AO ROOT DIAM: 3.3 CM
ECHO AO ROOT INDEX: 1.94 CM/M2
ECHO AV AREA PEAK VELOCITY: 1.3 CM2
ECHO AV AREA VTI: 1.4 CM2
ECHO AV AREA/BSA PEAK VELOCITY: 0.8 CM2/M2
ECHO AV AREA/BSA VTI: 0.8 CM2/M2
ECHO AV MEAN GRADIENT: 22 MMHG
ECHO AV MEAN VELOCITY: 2.2 M/S
ECHO AV PEAK GRADIENT: 36 MMHG
ECHO AV PEAK VELOCITY: 3 M/S
ECHO AV VELOCITY RATIO: 0.43
ECHO AV VTI: 74.2 CM
ECHO EST RA PRESSURE: 8 MMHG
ECHO IVC PROX: 2.2 CM
ECHO LA AREA 2C: 18.6 CM2
ECHO LA AREA 4C: 21.1 CM2
ECHO LA DIAMETER INDEX: 2.24 CM/M2
ECHO LA DIAMETER: 3.8 CM
ECHO LA MAJOR AXIS: 5.8 CM
ECHO LA MINOR AXIS: 5.2 CM
ECHO LA TO AORTIC ROOT RATIO: 1.15
ECHO LA VOL BP: 63 ML (ref 22–52)
ECHO LA VOL/BSA BIPLANE: 37 ML/M2 (ref 16–34)
ECHO LV E' SEPTAL VELOCITY: 10 CM/S
ECHO LV EDV A2C: 41 ML
ECHO LV EDV A4C: 69 ML
ECHO LV EDV INDEX A4C: 41 ML/M2
ECHO LV EDV NDEX A2C: 24 ML/M2
ECHO LV EJECTION FRACTION A2C: 63 %
ECHO LV EJECTION FRACTION A4C: 68 %
ECHO LV EJECTION FRACTION BIPLANE: 65 % (ref 55–100)
ECHO LV ESV A2C: 15 ML
ECHO LV ESV A4C: 22 ML
ECHO LV ESV INDEX A2C: 9 ML/M2
ECHO LV ESV INDEX A4C: 13 ML/M2
ECHO LV FRACTIONAL SHORTENING: 36 % (ref 28–44)
ECHO LV INTERNAL DIMENSION DIASTOLE INDEX: 2.59 CM/M2
ECHO LV INTERNAL DIMENSION DIASTOLIC: 4.4 CM (ref 3.9–5.3)
ECHO LV INTERNAL DIMENSION SYSTOLIC INDEX: 1.65 CM/M2
ECHO LV INTERNAL DIMENSION SYSTOLIC: 2.8 CM
ECHO LV IVSD: 1.1 CM (ref 0.6–0.9)
ECHO LV MASS 2D: 168.9 G (ref 67–162)
ECHO LV MASS INDEX 2D: 99.4 G/M2 (ref 43–95)
ECHO LV POSTERIOR WALL DIASTOLIC: 1.1 CM (ref 0.6–0.9)
ECHO LV RELATIVE WALL THICKNESS RATIO: 0.5
ECHO LVOT AREA: 3.1 CM2
ECHO LVOT AV VTI INDEX: 0.45
ECHO LVOT DIAM: 2 CM
ECHO LVOT MEAN GRADIENT: 4 MMHG
ECHO LVOT PEAK GRADIENT: 6 MMHG
ECHO LVOT PEAK VELOCITY: 1.3 M/S
ECHO LVOT STROKE VOLUME INDEX: 61.3 ML/M2
ECHO LVOT SV: 104.2 ML
ECHO LVOT VTI: 33.2 CM
ECHO MV A VELOCITY: 0.94 M/S
ECHO MV AREA VTI: 2.9 CM2
ECHO MV E DECELERATION TIME (DT): 323 MS
ECHO MV E VELOCITY: 1.03 M/S
ECHO MV E/A RATIO: 1.1
ECHO MV E/E' SEPTAL: 10.3
ECHO MV LVOT VTI INDEX: 1.08
ECHO MV MAX VELOCITY: 1.1 M/S
ECHO MV MEAN GRADIENT: 2 MMHG
ECHO MV MEAN VELOCITY: 0.7 M/S
ECHO MV PEAK GRADIENT: 5 MMHG
ECHO MV VTI: 36 CM
ECHO PULMONARY ARTERY END DIASTOLIC PRESSURE: 16 MMHG
ECHO PULMONARY ARTERY END DIASTOLIC PRESSURE: 7 MMHG
ECHO PV MAX VELOCITY: 1.1 M/S
ECHO PV MAX VELOCITY: 2 M/S
ECHO PV PEAK GRADIENT: 5 MMHG
ECHO PV REGURGITANT MAX VELOCITY: 1.3 M/S
ECHO RA AREA 4C: 16.6 CM2
ECHO RA END SYSTOLIC VOLUME APICAL 4 CHAMBER INDEX BSA: 25 ML/M2
ECHO RA VOLUME: 43 ML
ECHO RIGHT VENTRICULAR SYSTOLIC PRESSURE (RVSP): 26 MMHG
ECHO RV BASAL DIMENSION: 3.5 CM
ECHO RV LONGITUDINAL DIMENSION: 6.7 CM
ECHO RV MID DIMENSION: 2.3 CM
ECHO RV TAPSE: 2.2 CM (ref 1.7–?)
ECHO TV REGURGITANT MAX VELOCITY: 2.12 M/S
ECHO TV REGURGITANT PEAK GRADIENT: 18 MMHG

## 2023-03-24 ENCOUNTER — TELEPHONE (OUTPATIENT)
Facility: CLINIC | Age: 73
End: 2023-03-24

## 2023-03-24 NOTE — TELEPHONE ENCOUNTER
Patient called and request to schedule new patient appointment with Dr Amanda Trammell for June.  Please call her at [phone]

## 2023-03-30 ENCOUNTER — HOSPITAL ENCOUNTER (OUTPATIENT)
Age: 73
Discharge: HOME | End: 2023-04-01
Payer: MEDICARE

## 2023-03-30 VITALS
HEIGHT: 63 IN | WEIGHT: 150 LBS | DIASTOLIC BLOOD PRESSURE: 70 MMHG | BODY MASS INDEX: 26.58 KG/M2 | SYSTOLIC BLOOD PRESSURE: 152 MMHG

## 2023-03-30 DIAGNOSIS — I35.1 AORTIC VALVE INSUFFICIENCY, ETIOLOGY OF CARDIAC VALVE DISEASE UNSPECIFIED: ICD-10-CM

## 2023-03-30 PROCEDURE — 93306 TTE W/DOPPLER COMPLETE: CPT

## 2023-03-31 LAB
ECHO AO ASC DIAM: 3.7 CM
ECHO AO ASCENDING AORTA INDEX: 2.16 CM/M2
ECHO AO ROOT DIAM: 3.3 CM
ECHO AO ROOT INDEX: 1.93 CM/M2
ECHO AV AREA PEAK VELOCITY: 1.6 CM2
ECHO AV AREA VTI: 1.5 CM2
ECHO AV AREA/BSA PEAK VELOCITY: 0.9 CM2/M2
ECHO AV AREA/BSA VTI: 0.9 CM2/M2
ECHO AV MEAN GRADIENT: 20 MMHG
ECHO AV MEAN VELOCITY: 2.1 M/S
ECHO AV PEAK GRADIENT: 30 MMHG
ECHO AV PEAK VELOCITY: 2.8 M/S
ECHO AV VELOCITY RATIO: 0.46
ECHO AV VTI: 67.3 CM
ECHO BSA: 1.74 M2
ECHO EST RA PRESSURE: 3 MMHG
ECHO IVC PROX: 1.8 CM
ECHO LA AREA 2C: 20.1 CM2
ECHO LA AREA 4C: 23.1 CM2
ECHO LA DIAMETER INDEX: 2.16 CM/M2
ECHO LA DIAMETER: 3.7 CM
ECHO LA MAJOR AXIS: 6 CM
ECHO LA MINOR AXIS: 5.8 CM
ECHO LA TO AORTIC ROOT RATIO: 1.12
ECHO LA VOL 2C: 58 ML (ref 22–52)
ECHO LA VOL 4C: 70 ML (ref 22–52)
ECHO LA VOL BP: 64 ML (ref 22–52)
ECHO LA VOL/BSA BIPLANE: 37 ML/M2 (ref 16–34)
ECHO LA VOLUME INDEX A2C: 34 ML/M2 (ref 16–34)
ECHO LA VOLUME INDEX A4C: 41 ML/M2 (ref 16–34)
ECHO LV E' LATERAL VELOCITY: 8 CM/S
ECHO LV E' SEPTAL VELOCITY: 8 CM/S
ECHO LV EDV A2C: 51 ML
ECHO LV EDV A4C: 58 ML
ECHO LV EDV INDEX A4C: 34 ML/M2
ECHO LV EDV NDEX A2C: 30 ML/M2
ECHO LV EJECTION FRACTION A2C: 67 %
ECHO LV EJECTION FRACTION A4C: 65 %
ECHO LV EJECTION FRACTION BIPLANE: 65 % (ref 55–100)
ECHO LV ESV A2C: 17 ML
ECHO LV ESV A4C: 20 ML
ECHO LV ESV INDEX A2C: 10 ML/M2
ECHO LV ESV INDEX A4C: 12 ML/M2
ECHO LV FRACTIONAL SHORTENING: 35 % (ref 28–44)
ECHO LV GLOBAL LONGITUDINAL STRAIN (GLS): -27 %
ECHO LV INTERNAL DIMENSION DIASTOLE INDEX: 2.51 CM/M2
ECHO LV INTERNAL DIMENSION DIASTOLIC: 4.3 CM (ref 3.9–5.3)
ECHO LV INTERNAL DIMENSION SYSTOLIC INDEX: 1.64 CM/M2
ECHO LV INTERNAL DIMENSION SYSTOLIC: 2.8 CM
ECHO LV IVSD: 1.2 CM (ref 0.6–0.9)
ECHO LV MASS 2D: 184.7 G (ref 67–162)
ECHO LV MASS INDEX 2D: 108 G/M2 (ref 43–95)
ECHO LV POSTERIOR WALL DIASTOLIC: 1.2 CM (ref 0.6–0.9)
ECHO LV RELATIVE WALL THICKNESS RATIO: 0.56
ECHO LVOT AREA: 3.5 CM2
ECHO LVOT AV VTI INDEX: 0.44
ECHO LVOT DIAM: 2.1 CM
ECHO LVOT MEAN GRADIENT: 4 MMHG
ECHO LVOT PEAK GRADIENT: 6 MMHG
ECHO LVOT PEAK VELOCITY: 1.3 M/S
ECHO LVOT STROKE VOLUME INDEX: 59.5 ML/M2
ECHO LVOT SV: 101.8 ML
ECHO LVOT VTI: 29.4 CM
ECHO MV A VELOCITY: 0.88 M/S
ECHO MV AREA VTI: 3.5 CM2
ECHO MV E DECELERATION TIME (DT): 331 MS
ECHO MV E VELOCITY: 1.11 M/S
ECHO MV E/A RATIO: 1.26
ECHO MV E/E' LATERAL: 13.88
ECHO MV E/E' RATIO (AVERAGED): 13.88
ECHO MV E/E' SEPTAL: 13.88
ECHO MV LVOT VTI INDEX: 0.99
ECHO MV MAX VELOCITY: 1.1 M/S
ECHO MV MEAN GRADIENT: 2 MMHG
ECHO MV MEAN VELOCITY: 0.7 M/S
ECHO MV PEAK GRADIENT: 5 MMHG
ECHO MV VTI: 29 CM
ECHO PV MAX VELOCITY: 1.2 M/S
ECHO PV PEAK GRADIENT: 5 MMHG
ECHO RA AREA 4C: 14.4 CM2
ECHO RA END SYSTOLIC VOLUME APICAL 4 CHAMBER INDEX BSA: 20 ML/M2
ECHO RA VOLUME: 35 ML
ECHO RIGHT VENTRICULAR SYSTOLIC PRESSURE (RVSP): 31 MMHG
ECHO RV BASAL DIMENSION: 3.5 CM
ECHO RV LONGITUDINAL DIMENSION: 6.5 CM
ECHO RV MID DIMENSION: 2.3 CM
ECHO RV TAPSE: 2 CM (ref 1.7–?)
ECHO TV REGURGITANT MAX VELOCITY: 2.65 M/S
ECHO TV REGURGITANT PEAK GRADIENT: 28 MMHG

## 2023-04-05 ENCOUNTER — OFFICE VISIT (OUTPATIENT)
Dept: GASTROENTEROLOGY | Age: 73
End: 2023-04-05

## 2023-04-05 RX ORDER — DICYCLOMINE HYDROCHLORIDE 10 MG/1
10 CAPSULE ORAL
Qty: 90 CAPSULE | Refills: 3 | Status: SHIPPED
Start: 2023-04-05

## 2023-04-05 RX ORDER — PANTOPRAZOLE SODIUM 40 MG/1
40 TABLET, DELAYED RELEASE ORAL DAILY
Qty: 30 TABLET | Refills: 3 | Status: SHIPPED
Start: 2023-04-05

## 2023-04-05 RX ORDER — TRAMADOL HYDROCHLORIDE 50 MG/1
TABLET ORAL
Start: 2023-04-03

## 2023-04-05 NOTE — PROGRESS NOTES
1. Have you been to the ER, urgent care clinic since your last visit? Hospitalized since your last visit? no    2. Have you seen or consulted any other health care providers outside of the [address] [place] [address] since your last visit? Include any pap smears or colon screening.  No   Chief Complaint   Patient presents with    Abdominal Pain     Established patient     Visit Vitals  /80 (BP 1 Location: Left upper arm, BP Patient Position: Sitting, BP Cuff Size: Adult)   Pulse 69   Temp 97.8 °F (36.6 °C) (Temporal)   Resp 14   Ht 5' 3\" (1.6 m)   Wt 66.9 kg (147 lb 6.4 oz)   SpO2 98% Comment: room air   BMI 26.11 kg/m² Attending with

## 2023-04-06 ENCOUNTER — TELEPHONE (OUTPATIENT)
Dept: GASTROENTEROLOGY | Age: 73
End: 2023-04-06

## 2023-05-02 ENCOUNTER — TELEPHONE (OUTPATIENT)
Dept: GASTROENTEROLOGY | Age: 73
End: 2023-05-02

## 2023-05-02 DIAGNOSIS — R10.13 EPIGASTRIC PAIN: Primary | ICD-10-CM

## 2023-05-03 ENCOUNTER — TELEPHONE (OUTPATIENT)
Dept: GASTROENTEROLOGY | Age: 73
End: 2023-05-03

## 2023-05-03 RX ORDER — OMEPRAZOLE 40 MG/1
40 CAPSULE, DELAYED RELEASE ORAL DAILY
Qty: 30 CAPSULE | Refills: 3 | Status: SHIPPED | OUTPATIENT
Start: 2023-05-03

## 2023-05-26 RX ORDER — OMEPRAZOLE 40 MG/1
40 CAPSULE, DELAYED RELEASE ORAL DAILY
COMMUNITY
Start: 2023-05-03 | End: 2023-06-19 | Stop reason: ALTCHOICE

## 2023-05-26 RX ORDER — PANTOPRAZOLE SODIUM 40 MG/1
40 TABLET, DELAYED RELEASE ORAL DAILY
COMMUNITY
Start: 2023-04-05 | End: 2023-06-10

## 2023-05-26 RX ORDER — TRAMADOL HYDROCHLORIDE 50 MG/1
TABLET ORAL
COMMUNITY
Start: 2023-04-03

## 2023-06-05 ENCOUNTER — OFFICE VISIT (OUTPATIENT)
Age: 73
End: 2023-06-05
Payer: MEDICARE

## 2023-06-05 VITALS
RESPIRATION RATE: 14 BRPM | HEIGHT: 63 IN | TEMPERATURE: 97.8 F | BODY MASS INDEX: 25.94 KG/M2 | WEIGHT: 146.4 LBS | OXYGEN SATURATION: 98 % | SYSTOLIC BLOOD PRESSURE: 140 MMHG | HEART RATE: 59 BPM | DIASTOLIC BLOOD PRESSURE: 80 MMHG

## 2023-06-05 DIAGNOSIS — E78.00 HIGH CHOLESTEROL: Primary | ICD-10-CM

## 2023-06-05 DIAGNOSIS — R10.84 GENERALIZED ABDOMINAL PAIN: Primary | ICD-10-CM

## 2023-06-05 DIAGNOSIS — E07.9 THYROID DISEASE: ICD-10-CM

## 2023-06-05 DIAGNOSIS — N28.9 KIDNEY DISEASE: ICD-10-CM

## 2023-06-05 DIAGNOSIS — K59.04 CHRONIC IDIOPATHIC CONSTIPATION: ICD-10-CM

## 2023-06-05 DIAGNOSIS — K21.9 GASTROESOPHAGEAL REFLUX DISEASE, UNSPECIFIED WHETHER ESOPHAGITIS PRESENT: ICD-10-CM

## 2023-06-05 PROCEDURE — 1036F TOBACCO NON-USER: CPT | Performed by: INTERNAL MEDICINE

## 2023-06-05 PROCEDURE — G8427 DOCREV CUR MEDS BY ELIG CLIN: HCPCS | Performed by: INTERNAL MEDICINE

## 2023-06-05 PROCEDURE — 3078F DIAST BP <80 MM HG: CPT | Performed by: INTERNAL MEDICINE

## 2023-06-05 PROCEDURE — 1090F PRES/ABSN URINE INCON ASSESS: CPT | Performed by: INTERNAL MEDICINE

## 2023-06-05 PROCEDURE — 1123F ACP DISCUSS/DSCN MKR DOCD: CPT | Performed by: INTERNAL MEDICINE

## 2023-06-05 PROCEDURE — 3074F SYST BP LT 130 MM HG: CPT | Performed by: INTERNAL MEDICINE

## 2023-06-05 PROCEDURE — G8399 PT W/DXA RESULTS DOCUMENT: HCPCS | Performed by: INTERNAL MEDICINE

## 2023-06-05 PROCEDURE — 99214 OFFICE O/P EST MOD 30 MIN: CPT | Performed by: INTERNAL MEDICINE

## 2023-06-05 PROCEDURE — 3017F COLORECTAL CA SCREEN DOC REV: CPT | Performed by: INTERNAL MEDICINE

## 2023-06-05 PROCEDURE — G8419 CALC BMI OUT NRM PARAM NOF/U: HCPCS | Performed by: INTERNAL MEDICINE

## 2023-06-05 RX ORDER — LANOLIN ALCOHOL/MO/W.PET/CERES
400 CREAM (GRAM) TOPICAL DAILY
COMMUNITY

## 2023-06-05 RX ORDER — MULTIVIT WITH MINERALS/LUTEIN
1000 TABLET ORAL 2 TIMES DAILY
COMMUNITY

## 2023-06-05 ASSESSMENT — PATIENT HEALTH QUESTIONNAIRE - PHQ9
1. LITTLE INTEREST OR PLEASURE IN DOING THINGS: 0
SUM OF ALL RESPONSES TO PHQ QUESTIONS 1-9: 0
SUM OF ALL RESPONSES TO PHQ QUESTIONS 1-9: 0
SUM OF ALL RESPONSES TO PHQ9 QUESTIONS 1 & 2: 0
SUM OF ALL RESPONSES TO PHQ QUESTIONS 1-9: 0
2. FEELING DOWN, DEPRESSED OR HOPELESS: 0
SUM OF ALL RESPONSES TO PHQ QUESTIONS 1-9: 0

## 2023-06-05 NOTE — PROGRESS NOTES
1. Have you been to the ER, urgent care clinic since your last visit? Hospitalized since your last visit? no    2. Have you seen or consulted any other health care providers outside of the [address] [place] [address] since your last visit? Include any pap smears or colon screening.   No   Chief Complaint   Patient presents with    Follow-up    Abdominal Pain     BP (!) 140/80 (Site: Left Upper Arm, Position: Sitting, Cuff Size: Medium Adult)   Pulse 59   Temp 97.8 °F (36.6 °C) (Temporal)   Resp 14   Ht 5' 3\" (1.6 m)   Wt 146 lb 6.4 oz (66.4 kg)   SpO2 98% Comment: room air  BMI 25.93 kg/m²

## 2023-06-10 ASSESSMENT — ENCOUNTER SYMPTOMS
RECTAL PAIN: 0
BLOOD IN STOOL: 0
DIARRHEA: 0
VOMITING: 0
RESPIRATORY NEGATIVE: 1
ALLERGIC/IMMUNOLOGIC NEGATIVE: 1
ABDOMINAL DISTENTION: 0
ABDOMINAL PAIN: 1
ANAL BLEEDING: 0
CONSTIPATION: 1
NAUSEA: 0

## 2023-06-10 NOTE — PROGRESS NOTES
Mariposa Peters is a 81 y.o. female who presents today for the following:  Chief Complaint   Patient presents with    Follow-up    Abdominal Pain         Allergies   Allergen Reactions    Celecoxib Other (See Comments)     mental confusion  Other reaction(s): psychological reaction  Confusion    Doxycycline Hyclate Other (See Comments)    Promethazine Other (See Comments)     extremely restless    Statins Myalgia     Mild to moderate    Chicken Allergy Nausea And Vomiting    Doxycycline Anxiety       Current Outpatient Medications   Medication Sig Dispense Refill    Multiple Vitamin (MULTIVITAMIN ADULT PO) Take by mouth      Ascorbic Acid (VITAMIN C) 1000 MG tablet Take 1 tablet by mouth 2 times daily      folic acid (FOLVITE) 565 MCG tablet Take 1 tablet by mouth daily      Garlic (GARLIQUE PO) Take by mouth in the morning and at bedtime      Cholecalciferol (VITAMIN D) 10 MCG (400 UNIT) CAPS Capsule Take 1 capsule by mouth daily      MAGNESIUM GLYCINATE PO Take by mouth 800mg      omeprazole (PRILOSEC) 40 MG delayed release capsule Take 1 capsule by mouth daily      traMADol (ULTRAM) 50 MG tablet ceived the following from [facility]: Outside name: traMADoL (ULTRAM) 50 mg tablet      amLODIPine (NORVASC) 2.5 MG tablet TAKE 1 TABLET DAILY      diclofenac (VOLTAREN) 75 MG EC tablet TAKE 1 TABLET TWICE A DAY      dicyclomine (BENTYL) 10 MG capsule Take 1 capsule by mouth 4 times daily as needed      levothyroxine (SYNTHROID) 50 MCG tablet TAKE 1 TABLET DAILY      lisinopril-hydroCHLOROthiazide (PRINZIDE;ZESTORETIC) 20-12.5 MG per tablet 1 tablet daily      rOPINIRole (REQUIP) 0.5 MG tablet 1 to 2 tablets hs prn      zolpidem (AMBIEN) 5 MG tablet Take 1 tablet by mouth nightly as needed. No current facility-administered medications for this visit.        Past Medical History:   Diagnosis Date    Abdominal pain     Allergies     Arthritis     Bowel obstruction ([facility] [address] 2019    stricture small bowel

## 2023-06-16 ASSESSMENT — SOCIAL DETERMINANTS OF HEALTH (SDOH)
WITHIN THE LAST YEAR, HAVE TO BEEN RAPED OR FORCED TO HAVE ANY KIND OF SEXUAL ACTIVITY BY YOUR PARTNER OR EX-PARTNER?: NO
WITHIN THE LAST YEAR, HAVE YOU BEEN KICKED, HIT, SLAPPED, OR OTHERWISE PHYSICALLY HURT BY YOUR PARTNER OR EX-PARTNER?: NO
WITHIN THE LAST YEAR, HAVE YOU BEEN AFRAID OF YOUR PARTNER OR EX-PARTNER?: NO
WITHIN THE LAST YEAR, HAVE YOU BEEN HUMILIATED OR EMOTIONALLY ABUSED IN OTHER WAYS BY YOUR PARTNER OR EX-PARTNER?: NO

## 2023-06-19 ENCOUNTER — OFFICE VISIT (OUTPATIENT)
Facility: CLINIC | Age: 73
End: 2023-06-19
Payer: MEDICARE

## 2023-06-19 VITALS
BODY MASS INDEX: 25.62 KG/M2 | RESPIRATION RATE: 18 BRPM | OXYGEN SATURATION: 98 % | TEMPERATURE: 97.7 F | WEIGHT: 144.6 LBS | HEART RATE: 60 BPM | HEIGHT: 63 IN | DIASTOLIC BLOOD PRESSURE: 68 MMHG | SYSTOLIC BLOOD PRESSURE: 136 MMHG

## 2023-06-19 DIAGNOSIS — M13.0 POLYARTHROPATHY: ICD-10-CM

## 2023-06-19 DIAGNOSIS — L52: ICD-10-CM

## 2023-06-19 DIAGNOSIS — M25.521 RIGHT ELBOW PAIN: ICD-10-CM

## 2023-06-19 DIAGNOSIS — D50.9 IRON DEFICIENCY ANEMIA, UNSPECIFIED IRON DEFICIENCY ANEMIA TYPE: ICD-10-CM

## 2023-06-19 DIAGNOSIS — R10.9 CHRONIC ABDOMINAL PAIN: Primary | ICD-10-CM

## 2023-06-19 DIAGNOSIS — E55.9 VITAMIN D DEFICIENCY: ICD-10-CM

## 2023-06-19 DIAGNOSIS — G89.29 CHRONIC ABDOMINAL PAIN: Primary | ICD-10-CM

## 2023-06-19 DIAGNOSIS — E03.9 HYPOTHYROIDISM, UNSPECIFIED TYPE: ICD-10-CM

## 2023-06-19 DIAGNOSIS — R53.82 CHRONIC FATIGUE: ICD-10-CM

## 2023-06-19 DIAGNOSIS — K59.09 CHRONIC CONSTIPATION: ICD-10-CM

## 2023-06-19 DIAGNOSIS — I10 ESSENTIAL HYPERTENSION: ICD-10-CM

## 2023-06-19 DIAGNOSIS — G89.4 PAIN SYNDROME, CHRONIC: ICD-10-CM

## 2023-06-19 DIAGNOSIS — E78.5 HYPERLIPIDEMIA, UNSPECIFIED HYPERLIPIDEMIA TYPE: ICD-10-CM

## 2023-06-19 DIAGNOSIS — M79.10 MYALGIA: ICD-10-CM

## 2023-06-19 DIAGNOSIS — R00.2 PALPITATION: ICD-10-CM

## 2023-06-19 PROCEDURE — G8419 CALC BMI OUT NRM PARAM NOF/U: HCPCS | Performed by: FAMILY MEDICINE

## 2023-06-19 PROCEDURE — 3078F DIAST BP <80 MM HG: CPT | Performed by: FAMILY MEDICINE

## 2023-06-19 PROCEDURE — 99205 OFFICE O/P NEW HI 60 MIN: CPT | Performed by: FAMILY MEDICINE

## 2023-06-19 PROCEDURE — 1090F PRES/ABSN URINE INCON ASSESS: CPT | Performed by: FAMILY MEDICINE

## 2023-06-19 PROCEDURE — 3017F COLORECTAL CA SCREEN DOC REV: CPT | Performed by: FAMILY MEDICINE

## 2023-06-19 PROCEDURE — G8427 DOCREV CUR MEDS BY ELIG CLIN: HCPCS | Performed by: FAMILY MEDICINE

## 2023-06-19 PROCEDURE — 3075F SYST BP GE 130 - 139MM HG: CPT | Performed by: FAMILY MEDICINE

## 2023-06-19 PROCEDURE — 1123F ACP DISCUSS/DSCN MKR DOCD: CPT | Performed by: FAMILY MEDICINE

## 2023-06-19 PROCEDURE — G8399 PT W/DXA RESULTS DOCUMENT: HCPCS | Performed by: FAMILY MEDICINE

## 2023-06-19 PROCEDURE — 1036F TOBACCO NON-USER: CPT | Performed by: FAMILY MEDICINE

## 2023-06-19 RX ORDER — EZETIMIBE 10 MG/1
TABLET ORAL
COMMUNITY

## 2023-06-19 RX ORDER — FERROUS SULFATE 325(65) MG
TABLET ORAL
COMMUNITY
End: 2023-06-20

## 2023-06-19 SDOH — ECONOMIC STABILITY: HOUSING INSECURITY
IN THE LAST 12 MONTHS, WAS THERE A TIME WHEN YOU DID NOT HAVE A STEADY PLACE TO SLEEP OR SLEPT IN A SHELTER (INCLUDING NOW)?: NO

## 2023-06-19 SDOH — ECONOMIC STABILITY: FOOD INSECURITY: WITHIN THE PAST 12 MONTHS, YOU WORRIED THAT YOUR FOOD WOULD RUN OUT BEFORE YOU GOT MONEY TO BUY MORE.: NEVER TRUE

## 2023-06-19 SDOH — ECONOMIC STABILITY: FOOD INSECURITY: WITHIN THE PAST 12 MONTHS, THE FOOD YOU BOUGHT JUST DIDN'T LAST AND YOU DIDN'T HAVE MONEY TO GET MORE.: NEVER TRUE

## 2023-06-19 SDOH — ECONOMIC STABILITY: INCOME INSECURITY: HOW HARD IS IT FOR YOU TO PAY FOR THE VERY BASICS LIKE FOOD, HOUSING, MEDICAL CARE, AND HEATING?: NOT HARD AT ALL

## 2023-06-19 ASSESSMENT — ENCOUNTER SYMPTOMS
BLOOD IN STOOL: 0
ABDOMINAL PAIN: 1
COUGH: 0
CONSTIPATION: 1
DIARRHEA: 0
VOMITING: 0
SHORTNESS OF BREATH: 0
NAUSEA: 0

## 2023-06-19 ASSESSMENT — PATIENT HEALTH QUESTIONNAIRE - PHQ9
SUM OF ALL RESPONSES TO PHQ QUESTIONS 1-9: 0
1. LITTLE INTEREST OR PLEASURE IN DOING THINGS: 0
SUM OF ALL RESPONSES TO PHQ9 QUESTIONS 1 & 2: 0
SUM OF ALL RESPONSES TO PHQ QUESTIONS 1-9: 0
2. FEELING DOWN, DEPRESSED OR HOPELESS: 0

## 2023-06-19 NOTE — PROGRESS NOTES
Chief Complaint   Patient presents with    New Patient   RIGHT ELBOW PAIN x 4-6 weeks  Patient refuses a mammogram    1. \"Have you been to the ER, urgent care clinic since your last visit? Hospitalized since your last visit? \" No    2. \"Have you seen or consulted any other health care providers outside of the [address] [place] [address] since your last visit? \" No     3. For patients aged 39-70: Has the patient had a colonoscopy / FIT/ Cologuard? Yes - no Care Gap present      If the patient is female:    4. For patients aged 41-77: Has the patient had a mammogram within the past 2 years? No      5. For patients aged 21-65: Has the patient had a pap smear?  NA - based on age or sex

## 2023-06-19 NOTE — PROGRESS NOTES
Ting Pineda (: 1950) is a 81 y.o. female here for evaluation of the following chief concern(s):  Chronic condition management   Establishment of care; previously followed by Dr. Taylor Lau    ASSESSMENT/PLAN:  1. Chronic abdominal pain  -     Occult Blood Stool Immunoassay; Future  -     C-Reactive Protein; Future  -     Sedimentation Rate; Future  -     MANUEL, Direct, w/Reflex; Future  2. Chronic constipation  -     Comprehensive Metabolic Panel; Future  -     Occult Blood Stool Immunoassay; Future  3. Iron deficiency anemia, unspecified iron deficiency anemia type  -     CBC with Auto Differential; Future  -     Ferritin; Future  -     Iron and TIBC; Future  -     Occult Blood Stool Immunoassay; Future  -     Urinalysis with Microscopic; Future  4. Chronic fatigue  -     C-Reactive Protein; Future  -     Sedimentation Rate; Future  -     MANUEL, Direct, w/Reflex; Future  -     Rheumatoid Factor; Future  -     CCP Antibodies, IGG/IGA; Future  5. Polyarthropathy  -     C-Reactive Protein; Future  -     Sedimentation Rate; Future  -     MANUEL, Direct, w/Reflex; Future  -     Rheumatoid Factor; Future  -     CCP Antibodies, IGG/IGA; Future  6. Pain syndrome, chronic  -     C-Reactive Protein; Future  -     Sedimentation Rate; Future  -     MANUEL, Direct, w/Reflex; Future  -     Rheumatoid Factor; Future  -     CCP Antibodies, IGG/IGA; Future  7. Right elbow pain  -     diclofenac sodium (VOLTAREN) 1 % GEL; Apply 2 g topically 3 times daily as needed for Pain, Topical, 3 TIMES DAILY PRN Starting Mon 2023, Disp-100 g, R-0, Normal  8. Erythema nodosum migrans  -     C-Reactive Protein; Future  -     Sedimentation Rate; Future  -     MANUEL, Direct, w/Reflex; Future  -     Rheumatoid Factor; Future  -     CCP Antibodies, IGG/IGA; Future  9. Essential hypertension  -     Comprehensive Metabolic Panel; Future  -     TSH; Future  10.  Hyperlipidemia, unspecified hyperlipidemia type  -     Comprehensive Metabolic

## 2023-06-20 RX ORDER — FERROUS SULFATE 325(65) MG
TABLET ORAL
Qty: 60 TABLET | Refills: 3 | Status: SHIPPED | OUTPATIENT
Start: 2023-06-20

## 2023-06-22 ENCOUNTER — HOSPITAL ENCOUNTER (OUTPATIENT)
Age: 73
Discharge: HOME | End: 2023-06-22
Payer: MEDICARE

## 2023-06-22 DIAGNOSIS — R53.82 CHRONIC FATIGUE: ICD-10-CM

## 2023-06-22 DIAGNOSIS — M13.0 POLYARTHROPATHY: ICD-10-CM

## 2023-06-22 DIAGNOSIS — D50.9 IRON DEFICIENCY ANEMIA, UNSPECIFIED IRON DEFICIENCY ANEMIA TYPE: ICD-10-CM

## 2023-06-22 DIAGNOSIS — E55.9 VITAMIN D DEFICIENCY: ICD-10-CM

## 2023-06-22 DIAGNOSIS — G89.4 PAIN SYNDROME, CHRONIC: ICD-10-CM

## 2023-06-22 DIAGNOSIS — E78.5 HYPERLIPIDEMIA, UNSPECIFIED HYPERLIPIDEMIA TYPE: ICD-10-CM

## 2023-06-22 DIAGNOSIS — I10 ESSENTIAL HYPERTENSION: ICD-10-CM

## 2023-06-22 DIAGNOSIS — R10.9 CHRONIC ABDOMINAL PAIN: ICD-10-CM

## 2023-06-22 DIAGNOSIS — M79.10 MYALGIA: ICD-10-CM

## 2023-06-22 DIAGNOSIS — E03.9 HYPOTHYROIDISM, UNSPECIFIED TYPE: ICD-10-CM

## 2023-06-22 DIAGNOSIS — K59.09 CHRONIC CONSTIPATION: ICD-10-CM

## 2023-06-22 DIAGNOSIS — R00.2 PALPITATION: ICD-10-CM

## 2023-06-22 DIAGNOSIS — L52: ICD-10-CM

## 2023-06-22 DIAGNOSIS — G89.29 CHRONIC ABDOMINAL PAIN: ICD-10-CM

## 2023-06-22 LAB
25(OH)D3 SERPL-MCNC: 56.7 NG/ML (ref 30–100)
ALBUMIN SERPL-MCNC: 3.4 G/DL (ref 3.4–5)
ALBUMIN/GLOB SERPL: 0.9 (ref 0.8–1.7)
ALP SERPL-CCNC: 61 U/L (ref 45–117)
ALT SERPL-CCNC: 28 U/L (ref 13–56)
ANION GAP SERPL CALC-SCNC: 4 MMOL/L (ref 3–18)
APPEARANCE UR: CLEAR
AST SERPL W P-5'-P-CCNC: 22 U/L (ref 10–38)
BACTERIA URNS QL MICRO: ABNORMAL /HPF
BASOPHILS # BLD: 0.1 K/UL (ref 0–0.1)
BASOPHILS NFR BLD: 1 % (ref 0–2)
BILIRUB SERPL-MCNC: 0.4 MG/DL (ref 0.2–1)
BILIRUB UR QL: NEGATIVE
BUN SERPL-MCNC: 20 MG/DL (ref 7–18)
BUN/CREAT SERPL: 24 (ref 12–20)
CA-I BLD-MCNC: 9.5 MG/DL (ref 8.5–10.1)
CHLORIDE SERPL-SCNC: 103 MMOL/L (ref 100–111)
CHOLEST SERPL-MCNC: 232 MG/DL
CK SERPL-CCNC: 80 U/L (ref 26–192)
CO2 SERPL-SCNC: 31 MMOL/L (ref 21–32)
COLOR UR: YELLOW
CREAT SERPL-MCNC: 0.84 MG/DL (ref 0.6–1.3)
CRP SERPL-MCNC: 0.3 MG/DL (ref 0–0.3)
DIFFERENTIAL METHOD BLD: ABNORMAL
EOSINOPHIL # BLD: 0.2 K/UL (ref 0–0.4)
EOSINOPHIL NFR BLD: 2 % (ref 0–5)
EPITH CASTS URNS QL MICRO: ABNORMAL /LPF (ref 0–20)
ERYTHROCYTE [DISTWIDTH] IN BLOOD BY AUTOMATED COUNT: 13.3 % (ref 11.6–14.5)
ERYTHROCYTE [SEDIMENTATION RATE] IN BLOOD: 30 MM/HR
FERRITIN SERPL-MCNC: 17 NG/ML (ref 8–388)
GLOBULIN SER CALC-MCNC: 3.7 G/DL (ref 2–4)
GLUCOSE SERPL-MCNC: 85 MG/DL (ref 74–99)
GLUCOSE UR STRIP.AUTO-MCNC: NEGATIVE MG/DL
HCT VFR BLD AUTO: 37.2 % (ref 35–45)
HDLC SERPL-MCNC: 67 MG/DL (ref 40–60)
HDLC SERPL: 3.5 (ref 0–5)
HGB BLD-MCNC: 12 G/DL (ref 12–16)
HGB UR QL STRIP: NEGATIVE
IMM GRANULOCYTES # BLD AUTO: 0 K/UL (ref 0–0.04)
IMM GRANULOCYTES NFR BLD AUTO: 0 % (ref 0–0.5)
IRON SATN MFR SERPL: 10 % (ref 20–50)
IRON SERPL-MCNC: 37 UG/DL (ref 50–175)
KETONES UR QL STRIP.AUTO: NEGATIVE MG/DL
LDLC SERPL CALC-MCNC: 142 MG/DL (ref 0–100)
LEUKOCYTE ESTERASE UR QL STRIP.AUTO: NEGATIVE
LIPID PANEL: ABNORMAL
LYMPHOCYTES # BLD: 1.1 K/UL (ref 0.9–3.6)
LYMPHOCYTES NFR BLD: 14 % (ref 21–52)
MCH RBC QN AUTO: 29.9 PG (ref 24–34)
MCHC RBC AUTO-ENTMCNC: 32.3 G/DL (ref 31–37)
MCV RBC AUTO: 92.8 FL (ref 78–100)
MONOCYTES # BLD: 0.5 K/UL (ref 0.05–1.2)
MONOCYTES NFR BLD: 6 % (ref 3–10)
NEUTS SEG # BLD: 6.3 K/UL (ref 1.8–8)
NEUTS SEG NFR BLD: 77 % (ref 40–73)
NITRITE UR QL STRIP.AUTO: NEGATIVE
NRBC # BLD: 0 K/UL (ref 0–0.01)
NRBC BLD-RTO: 0 PER 100 WBC
PH UR STRIP: 6.5 (ref 5–8)
PLATELET # BLD AUTO: 394 K/UL (ref 135–420)
PMV BLD AUTO: 10.1 FL (ref 9.2–11.8)
POTASSIUM SERPL-SCNC: 3.8 MMOL/L (ref 3.5–5.5)
PROT SERPL-MCNC: 7.1 G/DL (ref 6.4–8.2)
PROT UR STRIP-MCNC: NEGATIVE MG/DL
RBC # BLD AUTO: 4.01 M/UL (ref 4.2–5.3)
RBC #/AREA URNS HPF: ABNORMAL /HPF (ref 0–2)
RHEUMATOID FACT SERPL-ACNC: <10 IU/ML
SODIUM SERPL-SCNC: 138 MMOL/L (ref 136–145)
SP GR UR REFRACTOMETRY: 1.01 (ref 1–1.03)
TIBC SERPL-MCNC: 361 UG/DL (ref 250–450)
TRIGL SERPL-MCNC: 115 MG/DL
TSH SERPL DL<=0.05 MIU/L-ACNC: 1.39 UIU/ML (ref 0.36–3.74)
UROBILINOGEN UR QL STRIP.AUTO: 0.2 EU/DL (ref 0.2–1)
VLDLC SERPL CALC-MCNC: 23 MG/DL
WBC # BLD AUTO: 8.1 K/UL (ref 4.6–13.2)
WBC URNS QL MICRO: ABNORMAL /HPF (ref 0–4)

## 2023-06-22 PROCEDURE — 81001 URINALYSIS AUTO W/SCOPE: CPT

## 2023-06-22 PROCEDURE — 36415 COLL VENOUS BLD VENIPUNCTURE: CPT

## 2023-06-22 PROCEDURE — 86225 DNA ANTIBODY NATIVE: CPT

## 2023-06-22 PROCEDURE — 83516 IMMUNOASSAY NONANTIBODY: CPT

## 2023-06-22 PROCEDURE — 86235 NUCLEAR ANTIGEN ANTIBODY: CPT

## 2023-06-22 PROCEDURE — 80053 COMPREHEN METABOLIC PANEL: CPT

## 2023-06-22 PROCEDURE — 82306 VITAMIN D 25 HYDROXY: CPT

## 2023-06-22 PROCEDURE — 86200 CCP ANTIBODY: CPT

## 2023-06-22 PROCEDURE — 82550 ASSAY OF CK (CPK): CPT

## 2023-06-22 PROCEDURE — 80061 LIPID PANEL: CPT

## 2023-06-22 PROCEDURE — 86431 RHEUMATOID FACTOR QUANT: CPT

## 2023-06-22 PROCEDURE — 85025 COMPLETE CBC W/AUTO DIFF WBC: CPT

## 2023-06-22 PROCEDURE — 84443 ASSAY THYROID STIM HORMONE: CPT

## 2023-06-22 PROCEDURE — 86140 C-REACTIVE PROTEIN: CPT

## 2023-06-22 PROCEDURE — 83540 ASSAY OF IRON: CPT

## 2023-06-22 PROCEDURE — 85651 RBC SED RATE NONAUTOMATED: CPT

## 2023-06-22 PROCEDURE — 82728 ASSAY OF FERRITIN: CPT

## 2023-06-23 LAB
CCP IGA+IGG SERPL IA-ACNC: 2 UNITS (ref 0–19)
CENTROMERE B AB SER-ACNC: <0.2 AI (ref 0–0.9)
CHROMATIN AB SERPL-ACNC: <0.2 AI (ref 0–0.9)
DSDNA AB SER-ACNC: 1 IU/ML (ref 0–9)
ENA JO1 AB SER-ACNC: <0.2 AI (ref 0–0.9)
ENA RNP AB SER-ACNC: <0.2 AI (ref 0–0.9)
ENA SCL70 AB SER-ACNC: <0.2 AI (ref 0–0.9)
ENA SM AB SER-ACNC: <0.2 AI (ref 0–0.9)
ENA SM+RNP AB SER-ACNC: <0.2 AI (ref 0–0.9)
ENA SS-A AB SER-ACNC: <0.2 AI (ref 0–0.9)
ENA SS-B AB SER-ACNC: <0.2 AI (ref 0–0.9)
RIBOSOMAL P AB SER-ACNC: <0.2 AI (ref 0–0.9)
SEE BELOW:, 164879: NORMAL

## 2023-07-03 ENCOUNTER — TELEPHONE (OUTPATIENT)
Facility: CLINIC | Age: 73
End: 2023-07-03

## 2023-07-03 RX ORDER — TRAMADOL HYDROCHLORIDE 50 MG/1
TABLET ORAL
Qty: 28 TABLET | Refills: 1 | OUTPATIENT
Start: 2023-07-03

## 2023-07-10 ENCOUNTER — OFFICE VISIT (OUTPATIENT)
Facility: CLINIC | Age: 73
End: 2023-07-10
Payer: MEDICARE

## 2023-07-10 ENCOUNTER — TELEPHONE (OUTPATIENT)
Facility: CLINIC | Age: 73
End: 2023-07-10

## 2023-07-10 VITALS
BODY MASS INDEX: 25.8 KG/M2 | HEART RATE: 59 BPM | HEIGHT: 63 IN | RESPIRATION RATE: 18 BRPM | TEMPERATURE: 97.2 F | SYSTOLIC BLOOD PRESSURE: 148 MMHG | DIASTOLIC BLOOD PRESSURE: 69 MMHG | OXYGEN SATURATION: 95 % | WEIGHT: 145.6 LBS

## 2023-07-10 DIAGNOSIS — I10 ESSENTIAL HYPERTENSION: ICD-10-CM

## 2023-07-10 DIAGNOSIS — G89.29 CHRONIC ABDOMINAL PAIN: ICD-10-CM

## 2023-07-10 DIAGNOSIS — K59.09 CHRONIC CONSTIPATION: ICD-10-CM

## 2023-07-10 DIAGNOSIS — G25.81 RESTLESS LEGS SYNDROME (RLS): ICD-10-CM

## 2023-07-10 DIAGNOSIS — M04.1 TRAPS (TUMOR NECROSIS FACTOR RECEPTORS ASSOCIATED PERIODIC SYNDROME) (HCC): Primary | ICD-10-CM

## 2023-07-10 DIAGNOSIS — E61.1 IRON DEFICIENCY: ICD-10-CM

## 2023-07-10 DIAGNOSIS — R10.9 CHRONIC ABDOMINAL PAIN: ICD-10-CM

## 2023-07-10 PROCEDURE — 1123F ACP DISCUSS/DSCN MKR DOCD: CPT | Performed by: FAMILY MEDICINE

## 2023-07-10 PROCEDURE — G8427 DOCREV CUR MEDS BY ELIG CLIN: HCPCS | Performed by: FAMILY MEDICINE

## 2023-07-10 PROCEDURE — 1036F TOBACCO NON-USER: CPT | Performed by: FAMILY MEDICINE

## 2023-07-10 PROCEDURE — G8419 CALC BMI OUT NRM PARAM NOF/U: HCPCS | Performed by: FAMILY MEDICINE

## 2023-07-10 PROCEDURE — 3017F COLORECTAL CA SCREEN DOC REV: CPT | Performed by: FAMILY MEDICINE

## 2023-07-10 PROCEDURE — 3074F SYST BP LT 130 MM HG: CPT | Performed by: FAMILY MEDICINE

## 2023-07-10 PROCEDURE — 3078F DIAST BP <80 MM HG: CPT | Performed by: FAMILY MEDICINE

## 2023-07-10 PROCEDURE — 99215 OFFICE O/P EST HI 40 MIN: CPT | Performed by: FAMILY MEDICINE

## 2023-07-10 PROCEDURE — G8399 PT W/DXA RESULTS DOCUMENT: HCPCS | Performed by: FAMILY MEDICINE

## 2023-07-10 PROCEDURE — 1090F PRES/ABSN URINE INCON ASSESS: CPT | Performed by: FAMILY MEDICINE

## 2023-07-10 RX ORDER — AMLODIPINE BESYLATE 5 MG/1
5 TABLET ORAL DAILY
Qty: 30 TABLET | Refills: 1 | Status: SHIPPED | OUTPATIENT
Start: 2023-07-10 | End: 2023-07-25 | Stop reason: SDUPTHER

## 2023-07-10 RX ORDER — PREDNISONE 20 MG/1
TABLET ORAL
Qty: 8 TABLET | Refills: 0 | Status: SHIPPED | OUTPATIENT
Start: 2023-07-10 | End: 2023-07-25 | Stop reason: ALTCHOICE

## 2023-07-10 ASSESSMENT — PATIENT HEALTH QUESTIONNAIRE - PHQ9
SUM OF ALL RESPONSES TO PHQ QUESTIONS 1-9: 0
2. FEELING DOWN, DEPRESSED OR HOPELESS: 0
SUM OF ALL RESPONSES TO PHQ QUESTIONS 1-9: 0
SUM OF ALL RESPONSES TO PHQ QUESTIONS 1-9: 0
1. LITTLE INTEREST OR PLEASURE IN DOING THINGS: 0
SUM OF ALL RESPONSES TO PHQ QUESTIONS 1-9: 0
SUM OF ALL RESPONSES TO PHQ9 QUESTIONS 1 & 2: 0

## 2023-07-10 NOTE — PROGRESS NOTES
Complaining of a flare up of being achy, fatigue,can't sleep, gut hurts at night. States she has been tested for several things and everything was negative. Treated for Traps but didn't help with symptoms. States getting worse over the past week. This appointment scheduled for tramadol refill, patient stated she has been out of her Tramadol about 2 weeks. Refuses mammogram today  Chief Complaint   Patient presents with    Medication Refill     Discuss medication refill    Fatigue     Inability to sleep     1. \"Have you been to the ER, urgent care clinic since your last visit? Hospitalized since your last visit? \" No    2. \"Have you seen or consulted any other health care providers outside of the [address] [place] [address] since your last visit? \" No     3. For patients aged 43-73: Has the patient had a colonoscopy / FIT/ Cologuard? Yes - no Care Gap present      If the patient is female:    4. For patients aged 43-66: Has the patient had a mammogram within the past 2 years? No  PATIENT REFUSES    5. For patients aged 21-65: Has the patient had a pap smear?  NA - based on age or sex
09:22 AM    BUN 20 06/22/2023 09:22 AM    GFRAA >60 06/08/2021 08:20 AM    GLOB 3.7 06/22/2023 09:22 AM    ALT 28 06/22/2023 09:22 AM    AST 22 06/22/2023 09:22 AM     Lab Results   Component Value Date/Time    CHOL 232 06/22/2023 09:22 AM    HDL 67 06/22/2023 09:22 AM       On this date 07/11/23 I have spent >45 minutes for chart review, face to face encounter with the patient for interview/exam, discussing working diagnosis and treatment plan. Medical decision making complexity: moderate-high.     Tonda Duane, MD   Family & Geriatric Medicine

## 2023-07-17 ENCOUNTER — HOSPITAL ENCOUNTER (OUTPATIENT)
Age: 73
Discharge: HOME | End: 2023-07-20

## 2023-07-17 DIAGNOSIS — I10 ESSENTIAL HYPERTENSION: ICD-10-CM

## 2023-07-17 DIAGNOSIS — G89.29 CHRONIC ABDOMINAL PAIN: ICD-10-CM

## 2023-07-17 DIAGNOSIS — R10.9 CHRONIC ABDOMINAL PAIN: ICD-10-CM

## 2023-07-17 DIAGNOSIS — G25.81 RESTLESS LEGS SYNDROME (RLS): ICD-10-CM

## 2023-07-17 DIAGNOSIS — E61.1 IRON DEFICIENCY: ICD-10-CM

## 2023-07-17 DIAGNOSIS — K59.09 CHRONIC CONSTIPATION: ICD-10-CM

## 2023-07-18 ENCOUNTER — HOSPITAL ENCOUNTER (OUTPATIENT)
Age: 73
Discharge: HOME | End: 2023-07-21

## 2023-07-18 ENCOUNTER — TELEPHONE (OUTPATIENT)
Facility: CLINIC | Age: 73
End: 2023-07-18

## 2023-07-18 DIAGNOSIS — R10.9 STOMACH ACHE: ICD-10-CM

## 2023-07-18 NOTE — TELEPHONE ENCOUNTER
[facility] called and said the labs they received are lableed as Miscellaneous Sendout and they needed to be labeled specific as to what is ordered, so they need \"new/ updated\" orders entered.

## 2023-07-19 LAB
SPECIMEN STATUS REPORT: NORMAL
SPECIMEN STATUS REPORT: NORMAL

## 2023-07-20 LAB
SPECIMEN STATUS REPORT: NORMAL
SPECIMEN STATUS REPORT: NORMAL

## 2023-07-21 LAB
COPRO1 24H UR-MCNC: 6 UG/L
COPRO1 24H UR-MRATE: 12 UG/24 HR (ref 0–24)
COPRO3 24H UR-MCNC: 7 UG/L
COPRO3 24H UR-MRATE: 14 UG/24 HR (ref 0–74)
HEPTA-CP 24H UR-MRATE: <2 UG/24 HR (ref 0–4)
HEPTA-CP UR-MCNC: <1 UG/L
HEXA-CP 24H UR-MRATE: <2 UG/24 HR (ref 0–1)
HEXA-CP UR-MCNC: <1 UG/L
PBG 24H UR-MCNC: 0.2 MG/L (ref 0–2)
PBG 24H UR-MRATE: 0.4 MG/24 HR (ref 0–1.5)
PENTA-CP 24H UR-MRATE: <2 UG/24 HR (ref 0–4)
PENTA-CP UR-MCNC: <1 UG/L
UROPOR 24H UR-MRATE: 6 UG/24 HR (ref 0–24)
UROPOR UR-MCNC: 3 UG/L

## 2023-07-25 ENCOUNTER — OFFICE VISIT (OUTPATIENT)
Facility: CLINIC | Age: 73
End: 2023-07-25
Payer: MEDICARE

## 2023-07-25 VITALS
DIASTOLIC BLOOD PRESSURE: 64 MMHG | RESPIRATION RATE: 18 BRPM | SYSTOLIC BLOOD PRESSURE: 124 MMHG | TEMPERATURE: 97.7 F | BODY MASS INDEX: 25.8 KG/M2 | WEIGHT: 145.6 LBS | OXYGEN SATURATION: 97 % | HEIGHT: 63 IN | HEART RATE: 64 BPM

## 2023-07-25 DIAGNOSIS — G47.00 INSOMNIA, UNSPECIFIED TYPE: ICD-10-CM

## 2023-07-25 DIAGNOSIS — R10.9 CHRONIC ABDOMINAL PAIN: ICD-10-CM

## 2023-07-25 DIAGNOSIS — M13.0 POLYARTHRITIS: Primary | ICD-10-CM

## 2023-07-25 DIAGNOSIS — I10 ESSENTIAL HYPERTENSION: ICD-10-CM

## 2023-07-25 DIAGNOSIS — G89.29 CHRONIC ABDOMINAL PAIN: ICD-10-CM

## 2023-07-25 PROCEDURE — 99214 OFFICE O/P EST MOD 30 MIN: CPT | Performed by: FAMILY MEDICINE

## 2023-07-25 PROCEDURE — 1090F PRES/ABSN URINE INCON ASSESS: CPT | Performed by: FAMILY MEDICINE

## 2023-07-25 PROCEDURE — 1123F ACP DISCUSS/DSCN MKR DOCD: CPT | Performed by: FAMILY MEDICINE

## 2023-07-25 PROCEDURE — 3074F SYST BP LT 130 MM HG: CPT | Performed by: FAMILY MEDICINE

## 2023-07-25 PROCEDURE — G8427 DOCREV CUR MEDS BY ELIG CLIN: HCPCS | Performed by: FAMILY MEDICINE

## 2023-07-25 PROCEDURE — 3017F COLORECTAL CA SCREEN DOC REV: CPT | Performed by: FAMILY MEDICINE

## 2023-07-25 PROCEDURE — 1036F TOBACCO NON-USER: CPT | Performed by: FAMILY MEDICINE

## 2023-07-25 PROCEDURE — G8399 PT W/DXA RESULTS DOCUMENT: HCPCS | Performed by: FAMILY MEDICINE

## 2023-07-25 PROCEDURE — 3078F DIAST BP <80 MM HG: CPT | Performed by: FAMILY MEDICINE

## 2023-07-25 PROCEDURE — G8419 CALC BMI OUT NRM PARAM NOF/U: HCPCS | Performed by: FAMILY MEDICINE

## 2023-07-25 RX ORDER — AMLODIPINE BESYLATE 5 MG/1
5 TABLET ORAL DAILY
Qty: 90 TABLET | Refills: 1 | Status: SHIPPED | OUTPATIENT
Start: 2023-07-25

## 2023-07-25 RX ORDER — TRAZODONE HYDROCHLORIDE 50 MG/1
50 TABLET ORAL NIGHTLY
Qty: 15 TABLET | Refills: 1 | Status: SHIPPED | OUTPATIENT
Start: 2023-07-25

## 2023-07-25 RX ORDER — PREDNISONE 20 MG/1
40 TABLET ORAL DAILY
Qty: 10 TABLET | Refills: 0 | Status: SHIPPED | OUTPATIENT
Start: 2023-07-25 | End: 2023-08-04

## 2023-07-26 ENCOUNTER — TELEPHONE (OUTPATIENT)
Facility: CLINIC | Age: 73
End: 2023-07-26

## 2023-07-26 NOTE — TELEPHONE ENCOUNTER
Pt called and would like for you to call her about the Prednisone that was sent in yesterday. She states it says take 2 tabs daily for 10 days but there were only 10 tabs sent in.

## 2023-08-18 ENCOUNTER — OFFICE VISIT (OUTPATIENT)
Facility: CLINIC | Age: 73
End: 2023-08-18
Payer: MEDICARE

## 2023-08-18 VITALS
HEART RATE: 66 BPM | TEMPERATURE: 97.8 F | DIASTOLIC BLOOD PRESSURE: 67 MMHG | SYSTOLIC BLOOD PRESSURE: 136 MMHG | BODY MASS INDEX: 26.29 KG/M2 | RESPIRATION RATE: 18 BRPM | WEIGHT: 148.4 LBS | HEIGHT: 63 IN | OXYGEN SATURATION: 98 %

## 2023-08-18 DIAGNOSIS — G25.81 RESTLESS LEG SYNDROME: ICD-10-CM

## 2023-08-18 DIAGNOSIS — M13.0 POLYARTHRITIS: Primary | ICD-10-CM

## 2023-08-18 DIAGNOSIS — I10 ESSENTIAL HYPERTENSION: ICD-10-CM

## 2023-08-18 DIAGNOSIS — G47.00 INSOMNIA, UNSPECIFIED TYPE: ICD-10-CM

## 2023-08-18 DIAGNOSIS — G89.29 CHRONIC ABDOMINAL PAIN: ICD-10-CM

## 2023-08-18 DIAGNOSIS — R25.2 MUSCLE CRAMPING: ICD-10-CM

## 2023-08-18 DIAGNOSIS — R10.9 CHRONIC ABDOMINAL PAIN: ICD-10-CM

## 2023-08-18 PROCEDURE — G8419 CALC BMI OUT NRM PARAM NOF/U: HCPCS | Performed by: FAMILY MEDICINE

## 2023-08-18 PROCEDURE — 3075F SYST BP GE 130 - 139MM HG: CPT | Performed by: FAMILY MEDICINE

## 2023-08-18 PROCEDURE — 1036F TOBACCO NON-USER: CPT | Performed by: FAMILY MEDICINE

## 2023-08-18 PROCEDURE — 1123F ACP DISCUSS/DSCN MKR DOCD: CPT | Performed by: FAMILY MEDICINE

## 2023-08-18 PROCEDURE — 3017F COLORECTAL CA SCREEN DOC REV: CPT | Performed by: FAMILY MEDICINE

## 2023-08-18 PROCEDURE — 1090F PRES/ABSN URINE INCON ASSESS: CPT | Performed by: FAMILY MEDICINE

## 2023-08-18 PROCEDURE — 99215 OFFICE O/P EST HI 40 MIN: CPT | Performed by: FAMILY MEDICINE

## 2023-08-18 PROCEDURE — G8399 PT W/DXA RESULTS DOCUMENT: HCPCS | Performed by: FAMILY MEDICINE

## 2023-08-18 PROCEDURE — 3078F DIAST BP <80 MM HG: CPT | Performed by: FAMILY MEDICINE

## 2023-08-18 PROCEDURE — G8427 DOCREV CUR MEDS BY ELIG CLIN: HCPCS | Performed by: FAMILY MEDICINE

## 2023-08-18 RX ORDER — DICYCLOMINE HCL 20 MG
20 TABLET ORAL AS NEEDED
COMMUNITY

## 2023-08-18 RX ORDER — GABAPENTIN 100 MG/1
100 CAPSULE ORAL NIGHTLY
Qty: 15 CAPSULE | Refills: 0 | Status: SHIPPED | OUTPATIENT
Start: 2023-08-18 | End: 2023-09-02

## 2023-08-18 ASSESSMENT — PATIENT HEALTH QUESTIONNAIRE - PHQ9
1. LITTLE INTEREST OR PLEASURE IN DOING THINGS: 0
SUM OF ALL RESPONSES TO PHQ QUESTIONS 1-9: 0
SUM OF ALL RESPONSES TO PHQ9 QUESTIONS 1 & 2: 0
2. FEELING DOWN, DEPRESSED OR HOPELESS: 0
SUM OF ALL RESPONSES TO PHQ QUESTIONS 1-9: 0

## 2023-08-18 NOTE — PROGRESS NOTES
Chief Complaint   Patient presents with    Follow-up     4 week   Refuses Mammogram and MWV. 1. \"Have you been to the ER, urgent care clinic since your last visit? Hospitalized since your last visit? \" No    2. \"Have you seen or consulted any other health care providers outside of the [address] [place] [address] since your last visit? \" Yes Where: GI doctor increased dicyclomine to 20 mg.      3. For patients aged 43-73: Has the patient had a colonoscopy / FIT/ Cologuard? Yes - no Care Gap present      If the patient is female:    4. For patients aged 43-66: Has the patient had a mammogram within the past 2 years? No    Patient refuses mammogram  5. For patients aged 21-65: Has the patient had a pap smear?  NA - based on age or sex

## 2023-08-18 NOTE — PROGRESS NOTES
Kwaku James (: 1950) is a 81 y.o. female here for evaluation of the following chief concern(s):  Chronic condition management     ASSESSMENT/PLAN:  1. Polyarthritis  2. Chronic abdominal pain  3. Muscle cramping  4. Insomnia, unspecified type  5. Restless leg syndrome  -     gabapentin (NEURONTIN) 100 MG capsule; Take 1 capsule by mouth nightly for 15 days. Intended supply: 30 days Max Daily Amount: 100 mg, Disp-15 capsule, R-0Normal  6. Essential hypertension    Mrs. Arcelia Armijo appears medically stable, normal hemodynamics including blood pressure. Pt has an unusual consolation of chronic/intermittent symptoms (abd pain, constipation, myalgias/limb pain, hypertension), autoimmune screening tests negative, though hx of TRAPS diagnosis. Pt following w/ GI for chronic abdominal pain. Pending referral to Rheumatology to eval and treat for possible seronegative rheumatologic disease, strong family hx of autoimmune disease. Pending referral to Hematology for chronic iron deficiency despite supplements, unknown cause, restless legs/myalgias- may benefit from additional evaluation, iron infusions. Ddx may also include porphyria, tick borne illness. Pt opts for a trial of Gabapentin for RLS, concern that Ropinirole could be worsening her LE pain/cramping- rare but possible side effect. I advised trying to reduce Ropinirole dose while on Gabapentin, and risk for increased sedative effect if used in combination w/ Tramadol. She can send a portal message for Gabapentin refill if this works for her. Future visit to consider thyroid ultrasound. Return in about 3 months (around 2023) for follow-up chronic conditions or sooner if needed. Kwaku James agrees with plan as above and has no additional questions at this time. SUBJECTIVE/OBJECTIVE:  Overall, pt is feeling \"some better\".     Polyarthritis, chronic abdominal pain:  Pt was able to get in touch w/ Dr. Beuford Goltz and

## 2023-09-18 ENCOUNTER — TELEPHONE (OUTPATIENT)
Facility: CLINIC | Age: 73
End: 2023-09-18

## 2023-09-18 DIAGNOSIS — E03.9 HYPOTHYROIDISM, UNSPECIFIED TYPE: Primary | ICD-10-CM

## 2023-09-18 DIAGNOSIS — M13.0 POLYARTHRITIS: ICD-10-CM

## 2023-09-18 RX ORDER — DICLOFENAC SODIUM 75 MG/1
TABLET, DELAYED RELEASE ORAL
Qty: 60 TABLET | Refills: 0 | Status: SHIPPED | OUTPATIENT
Start: 2023-09-18

## 2023-09-18 RX ORDER — LEVOTHYROXINE SODIUM 0.05 MG/1
50 TABLET ORAL DAILY
Qty: 90 TABLET | Refills: 0 | Status: SHIPPED | OUTPATIENT
Start: 2023-09-18

## 2023-09-18 NOTE — TELEPHONE ENCOUNTER
Patient called and needs a medication refill on   diclofenac (VOLTAREN) 75 MG EC tablet  levothyroxine (SYNTHROID) 50 MCG tablet  Please advise.

## 2023-11-20 ENCOUNTER — HOSPITAL ENCOUNTER (OUTPATIENT)
Age: 73
Setting detail: SPECIMEN
Discharge: HOME | End: 2023-11-23
Payer: MEDICARE

## 2023-11-20 ENCOUNTER — OFFICE VISIT (OUTPATIENT)
Facility: CLINIC | Age: 73
End: 2023-11-20
Payer: MEDICARE

## 2023-11-20 VITALS
BODY MASS INDEX: 25.87 KG/M2 | WEIGHT: 146 LBS | OXYGEN SATURATION: 98 % | HEART RATE: 59 BPM | DIASTOLIC BLOOD PRESSURE: 71 MMHG | SYSTOLIC BLOOD PRESSURE: 137 MMHG | HEIGHT: 63 IN | TEMPERATURE: 97.7 F | RESPIRATION RATE: 18 BRPM

## 2023-11-20 DIAGNOSIS — M13.0 POLYARTHRITIS: ICD-10-CM

## 2023-11-20 DIAGNOSIS — R10.9 CHRONIC ABDOMINAL PAIN: ICD-10-CM

## 2023-11-20 DIAGNOSIS — G89.29 CHRONIC ABDOMINAL PAIN: ICD-10-CM

## 2023-11-20 DIAGNOSIS — E03.9 HYPOTHYROIDISM, UNSPECIFIED TYPE: ICD-10-CM

## 2023-11-20 DIAGNOSIS — E61.1 IRON DEFICIENCY: ICD-10-CM

## 2023-11-20 DIAGNOSIS — R76.8 POSITIVE AUTOANTIBODY SCREENING FOR CELIAC DISEASE: Primary | ICD-10-CM

## 2023-11-20 DIAGNOSIS — G47.00 INSOMNIA, UNSPECIFIED TYPE: ICD-10-CM

## 2023-11-20 DIAGNOSIS — G25.81 RESTLESS LEG SYNDROME: ICD-10-CM

## 2023-11-20 DIAGNOSIS — R25.2 MUSCLE CRAMPING: ICD-10-CM

## 2023-11-20 DIAGNOSIS — I10 ESSENTIAL HYPERTENSION: ICD-10-CM

## 2023-11-20 LAB — TSH SERPL DL<=0.05 MIU/L-ACNC: 0.84 UIU/ML (ref 0.36–3.74)

## 2023-11-20 PROCEDURE — G8419 CALC BMI OUT NRM PARAM NOF/U: HCPCS | Performed by: FAMILY MEDICINE

## 2023-11-20 PROCEDURE — 3078F DIAST BP <80 MM HG: CPT | Performed by: FAMILY MEDICINE

## 2023-11-20 PROCEDURE — G8399 PT W/DXA RESULTS DOCUMENT: HCPCS | Performed by: FAMILY MEDICINE

## 2023-11-20 PROCEDURE — 3017F COLORECTAL CA SCREEN DOC REV: CPT | Performed by: FAMILY MEDICINE

## 2023-11-20 PROCEDURE — G8484 FLU IMMUNIZE NO ADMIN: HCPCS | Performed by: FAMILY MEDICINE

## 2023-11-20 PROCEDURE — 1123F ACP DISCUSS/DSCN MKR DOCD: CPT | Performed by: FAMILY MEDICINE

## 2023-11-20 PROCEDURE — 1090F PRES/ABSN URINE INCON ASSESS: CPT | Performed by: FAMILY MEDICINE

## 2023-11-20 PROCEDURE — 3075F SYST BP GE 130 - 139MM HG: CPT | Performed by: FAMILY MEDICINE

## 2023-11-20 PROCEDURE — G8427 DOCREV CUR MEDS BY ELIG CLIN: HCPCS | Performed by: FAMILY MEDICINE

## 2023-11-20 PROCEDURE — 84443 ASSAY THYROID STIM HORMONE: CPT

## 2023-11-20 PROCEDURE — 99214 OFFICE O/P EST MOD 30 MIN: CPT | Performed by: FAMILY MEDICINE

## 2023-11-20 PROCEDURE — 1036F TOBACCO NON-USER: CPT | Performed by: FAMILY MEDICINE

## 2023-11-20 PROCEDURE — 36415 COLL VENOUS BLD VENIPUNCTURE: CPT | Performed by: FAMILY MEDICINE

## 2023-11-20 RX ORDER — PHENOL 1.4 %
AEROSOL, SPRAY (ML) MUCOUS MEMBRANE
COMMUNITY
Start: 2023-09-11

## 2023-11-20 RX ORDER — FERROUS SULFATE 325(65) MG
325 TABLET ORAL 2 TIMES DAILY
Qty: 180 TABLET | Refills: 3 | Status: SHIPPED | OUTPATIENT
Start: 2023-11-20

## 2023-11-20 RX ORDER — CHOLECALCIFEROL (VITAMIN D3) 125 MCG
CAPSULE ORAL
COMMUNITY
Start: 2023-10-01

## 2023-11-20 ASSESSMENT — PATIENT HEALTH QUESTIONNAIRE - PHQ9
1. LITTLE INTEREST OR PLEASURE IN DOING THINGS: 0
SUM OF ALL RESPONSES TO PHQ QUESTIONS 1-9: 0
2. FEELING DOWN, DEPRESSED OR HOPELESS: 0
SUM OF ALL RESPONSES TO PHQ QUESTIONS 1-9: 0
SUM OF ALL RESPONSES TO PHQ QUESTIONS 1-9: 0
SUM OF ALL RESPONSES TO PHQ9 QUESTIONS 1 & 2: 0
SUM OF ALL RESPONSES TO PHQ QUESTIONS 1-9: 0

## 2023-11-20 NOTE — PROGRESS NOTES
Patient refuses Medicare wellness and mammogram.   Chief Complaint   Patient presents with    Follow-up     Chronic condition     Verbal order from Kane Crawford MD to order labs/sign and draw them in office    Labs were drawn and sent to [facility] by Haley Steele LPN:    The following tubes were sent:    0 Lavendar, 0 Red, 1 SST, 0 Urine    Draw site right antecubital.  Patient tolerated draw with no distress. 1. \"Have you been to the ER, urgent care clinic since your last visit? Hospitalized since your last visit? \" No    2. \"Have you seen or consulted any other health care providers outside of the [address] [place] [address] since your last visit? \" Yes When: GI and hematology      3. For patients aged 43-73: Has the patient had a colonoscopy / FIT/ Cologuard? Yes - no Care Gap present      If the patient is female:    4. For patients aged 43-66: Has the patient had a mammogram within the past 2 years? No  Refuses    5. For patients aged 21-65: Has the patient had a pap smear?  No
Abused: No     Sexually Abused: No   Housing Stability: Unknown (6/19/2023)    Housing Stability Vital Sign     Unable to Pay for Housing in the Last Year: Not on file     Number of Places Lived in the Last Year: Not on file     Unstable Housing in the Last Year: No       Social History     Tobacco Use   Smoking Status Never   Smokeless Tobacco Never         Current Outpatient Medications:     L-Theanine 200 MG CAPS, , Disp: , Rfl:     Melatonin 10 MG TABS, , Disp: , Rfl:     ferrous sulfate (FEROSUL) 325 (65 Fe) MG tablet, Take 1 tablet by mouth 2 times daily, Disp: 180 tablet, Rfl: 3    diclofenac (VOLTAREN) 75 MG EC tablet, 1 tablet nightly, Disp: 60 tablet, Rfl: 0    levothyroxine (SYNTHROID) 50 MCG tablet, Take 1 tablet by mouth daily, Disp: 90 tablet, Rfl: 0    dicyclomine (BENTYL) 20 MG tablet, Take 1 tablet by mouth as needed Patient only takes 1 daily as needed, Disp: , Rfl:     amLODIPine (NORVASC) 5 MG tablet, Take 1 tablet by mouth daily, Disp: 90 tablet, Rfl: 1    ezetimibe (ZETIA) 10 MG tablet, , Disp: , Rfl:     Multiple Vitamin (MULTIVITAMIN ADULT PO), Take by mouth, Disp: , Rfl:     Ascorbic Acid (VITAMIN C) 1000 MG tablet, Take 1 tablet by mouth 2 times daily, Disp: , Rfl:     folic acid (FOLVITE) 377 MCG tablet, Take 1 tablet by mouth daily, Disp: , Rfl:     Garlic (GARLIQUE PO), Take by mouth in the morning and at bedtime, Disp: , Rfl:     MAGNESIUM GLYCINATE PO, Take by mouth 800mg, Disp: , Rfl:     lisinopril-hydroCHLOROthiazide (PRINZIDE;ZESTORETIC) 20-12.5 MG per tablet, 1 tablet daily, Disp: , Rfl:     rOPINIRole (REQUIP) 0.5 MG tablet, 1 to 2 tablets hs prn, Disp: , Rfl:     Allergies   Allergen Reactions    Celecoxib Other (See Comments)     mental confusion  Other reaction(s): psychological reaction  Confusion    Doxycycline Hyclate Other (See Comments)    Promethazine Other (See Comments)     extremely restless    Statins Myalgia     Mild to moderate    Chicken Allergy Nausea And

## 2023-11-28 DIAGNOSIS — M13.0 POLYARTHRITIS: ICD-10-CM

## 2023-11-28 RX ORDER — DICLOFENAC SODIUM 75 MG/1
TABLET, DELAYED RELEASE ORAL
Qty: 60 TABLET | Refills: 0 | Status: SHIPPED | OUTPATIENT
Start: 2023-11-28

## 2024-01-03 RX ORDER — LISINOPRIL AND HYDROCHLOROTHIAZIDE 20; 12.5 MG/1; MG/1
1 TABLET ORAL DAILY
Qty: 30 TABLET | Refills: 1 | Status: SHIPPED | OUTPATIENT
Start: 2024-01-03

## 2024-01-03 NOTE — TELEPHONE ENCOUNTER
Pt called for refill. This is the first time we are refilling for her. And she wants sent to mail order pharmacy.

## 2024-01-15 ENCOUNTER — HOSPITAL ENCOUNTER (OUTPATIENT)
Facility: HOSPITAL | Age: 74
Discharge: HOME | End: 2024-01-18
Attending: SURGERY
Payer: MEDICARE

## 2024-01-15 DIAGNOSIS — I70.1 LEFT RENAL ARTERY STENOSIS (HCC): ICD-10-CM

## 2024-01-15 DIAGNOSIS — E03.9 HYPOTHYROIDISM, UNSPECIFIED TYPE: ICD-10-CM

## 2024-01-15 LAB — CREAT UR-MCNC: 1 MG/DL (ref 0.6–1.3)

## 2024-01-15 PROCEDURE — 82565 ASSAY OF CREATININE: CPT

## 2024-01-15 PROCEDURE — 74174 CTA ABD&PLVS W/CONTRAST: CPT

## 2024-01-15 PROCEDURE — 6360000004 HC RX CONTRAST MEDICATION: Performed by: SURGERY

## 2024-01-15 RX ADMIN — IOPAMIDOL 100 ML: 755 INJECTION, SOLUTION INTRAVENOUS at 11:36

## 2024-01-16 RX ORDER — LEVOTHYROXINE SODIUM 0.05 MG/1
50 TABLET ORAL DAILY
Qty: 90 TABLET | Refills: 0 | Status: SHIPPED | OUTPATIENT
Start: 2024-01-16

## 2024-01-31 NOTE — PROGRESS NOTES
arrangements for a responsible adult (18 years or older) to be with you for 24 hours after your surgery.   17. ONE VISITOR will be allowed in the waiting area during your surgery.  Exceptions may be made for surgical admissions, per nursing unit guidelines      Special Instructions:      Bring a list of CURRENT medications.    Follow instructions from the office regarding medications to take the morning of surgery.       On day of surgery if you are running late, unable to make procedure time, or sick, please call the Pre-op department at [phone]    These surgical instructions were reviewed with Yodit Yoo during the PAT phone call.

## 2024-02-06 DIAGNOSIS — M13.0 POLYARTHRITIS: ICD-10-CM

## 2024-02-06 RX ORDER — DICLOFENAC SODIUM 75 MG/1
TABLET, DELAYED RELEASE ORAL
Qty: 60 TABLET | Refills: 0 | Status: SHIPPED | OUTPATIENT
Start: 2024-02-06

## 2024-02-13 ENCOUNTER — ANESTHESIA EVENT (OUTPATIENT)
Facility: HOSPITAL | Age: 74
End: 2024-02-13
Payer: MEDICARE

## 2024-02-14 ENCOUNTER — HOSPITAL ENCOUNTER (OUTPATIENT)
Facility: HOSPITAL | Age: 74
Setting detail: OUTPATIENT SURGERY
Discharge: HOME | End: 2024-02-14
Attending: INTERNAL MEDICINE | Admitting: INTERNAL MEDICINE
Payer: MEDICARE

## 2024-02-14 ENCOUNTER — ANESTHESIA (OUTPATIENT)
Facility: HOSPITAL | Age: 74
End: 2024-02-14
Payer: MEDICARE

## 2024-02-14 VITALS
OXYGEN SATURATION: 100 % | TEMPERATURE: 97.7 F | RESPIRATION RATE: 10 BRPM | DIASTOLIC BLOOD PRESSURE: 74 MMHG | BODY MASS INDEX: 26.01 KG/M2 | HEART RATE: 48 BPM | HEIGHT: 63 IN | WEIGHT: 146.8 LBS | SYSTOLIC BLOOD PRESSURE: 139 MMHG

## 2024-02-14 PROCEDURE — 2709999900 HC NON-CHARGEABLE SUPPLY: Performed by: INTERNAL MEDICINE

## 2024-02-14 PROCEDURE — 88305 TISSUE EXAM BY PATHOLOGIST: CPT

## 2024-02-14 PROCEDURE — 2580000003 HC RX 258: Performed by: NURSE ANESTHETIST, CERTIFIED REGISTERED

## 2024-02-14 PROCEDURE — 3700000000 HC ANESTHESIA ATTENDED CARE: Performed by: INTERNAL MEDICINE

## 2024-02-14 PROCEDURE — 3600007502: Performed by: INTERNAL MEDICINE

## 2024-02-14 PROCEDURE — 7100000010 HC PHASE II RECOVERY - FIRST 15 MIN: Performed by: INTERNAL MEDICINE

## 2024-02-14 PROCEDURE — 7100000000 HC PACU RECOVERY - FIRST 15 MIN: Performed by: INTERNAL MEDICINE

## 2024-02-14 PROCEDURE — 6360000002 HC RX W HCPCS: Performed by: STUDENT IN AN ORGANIZED HEALTH CARE EDUCATION/TRAINING PROGRAM

## 2024-02-14 RX ORDER — PROPOFOL 10 MG/ML
INJECTION, EMULSION INTRAVENOUS PRN
Status: DISCONTINUED | OUTPATIENT
Start: 2024-02-14 | End: 2024-02-14 | Stop reason: SDUPTHER

## 2024-02-14 RX ORDER — SODIUM CHLORIDE, SODIUM LACTATE, POTASSIUM CHLORIDE, CALCIUM CHLORIDE 600; 310; 30; 20 MG/100ML; MG/100ML; MG/100ML; MG/100ML
INJECTION, SOLUTION INTRAVENOUS CONTINUOUS
Status: DISCONTINUED | OUTPATIENT
Start: 2024-02-14 | End: 2024-02-14 | Stop reason: HOSPADM

## 2024-02-14 RX ORDER — LIDOCAINE HYDROCHLORIDE 10 MG/ML
1 INJECTION, SOLUTION EPIDURAL; INFILTRATION; INTRACAUDAL; PERINEURAL
Status: DISCONTINUED | OUTPATIENT
Start: 2024-02-14 | End: 2024-02-14 | Stop reason: HOSPADM

## 2024-02-14 RX ORDER — SODIUM CHLORIDE 0.9 % (FLUSH) 0.9 %
5-40 SYRINGE (ML) INJECTION PRN
Status: DISCONTINUED | OUTPATIENT
Start: 2024-02-14 | End: 2024-02-14 | Stop reason: HOSPADM

## 2024-02-14 RX ADMIN — PROPOFOL 20 MG: 10 INJECTION, EMULSION INTRAVENOUS at 08:07

## 2024-02-14 RX ADMIN — SODIUM CHLORIDE, POTASSIUM CHLORIDE, SODIUM LACTATE AND CALCIUM CHLORIDE: 600; 310; 30; 20 INJECTION, SOLUTION INTRAVENOUS at 07:41

## 2024-02-14 RX ADMIN — PROPOFOL 30 MG: 10 INJECTION, EMULSION INTRAVENOUS at 08:05

## 2024-02-14 RX ADMIN — PROPOFOL 50 MG: 10 INJECTION, EMULSION INTRAVENOUS at 08:03

## 2024-02-14 NOTE — ANESTHESIA POSTPROCEDURE EVALUATION
Department of Anesthesiology  Postprocedure Note    Patient: Yodit Yoo  MRN: [id number]  YOB: 1950  Date of evaluation: 2/14/2024    Procedure Summary       Date: 02/14/24 Room / Location: [facility] ENDO 02 / [facility] ENDOSCOPY    Anesthesia Start: 0800 Anesthesia Stop: 0815    Procedure: EGD ESOPHAGOGASTRODUODENOSCOPY W/ BXS (Upper GI Region) Diagnosis:       Abnormal celiac antibody panel      Chronic abdominal pain      Abdominal pain, epigastric      (Abnormal celiac antibody panel [R89.4])      (Chronic abdominal pain [R10.9, G89.29])      (Abdominal pain, epigastric [R10.13])    Surgeons: Senia Sánchez MD Responsible Provider: Cristobal aGspar MD    Anesthesia Type: MAC ASA Status: 2            Anesthesia Type: MAC    Agatha Phase I: Agatha Score: 10    Agatha Phase II:      Anesthesia Post Evaluation    Patient location during evaluation: PACU  Patient participation: complete - patient participated  Level of consciousness: sleepy but conscious  Pain score: 0  Airway patency: patent  Nausea & Vomiting: no nausea and no vomiting  Cardiovascular status: blood pressure returned to baseline  Respiratory status: acceptable  Hydration status: euvolemic  Pain management: adequate    No notable events documented.

## 2024-02-14 NOTE — ANESTHESIA PRE PROCEDURE
AM    GLUCOSE 85 06/22/2023 09:22 AM    PROT 7.1 06/22/2023 09:22 AM    CALCIUM 9.5 06/22/2023 09:22 AM    BILITOT 0.4 06/22/2023 09:22 AM    ALKPHOS 61 06/22/2023 09:22 AM    ALKPHOS 48 06/08/2021 08:20 AM    AST 22 06/22/2023 09:22 AM    ALT 28 06/22/2023 09:22 AM       POC Tests: No results for input(s): \"POCGLU\", \"POCNA\", \"POCK\", \"POCCL\", \"POCBUN\", \"POCHEMO\", \"POCHCT\" in the last 72 hours.    Coags: No results found for: \"PROTIME\", \"INR\", \"APTT\"    HCG (If Applicable): No results found for: \"PREGTESTUR\", \"PREGSERUM\", \"HCG\", \"HCGQUANT\"     ABGs: No results found for: \"PHART\", \"PO2ART\", \"IUH1IUF\", \"RTR5VWU\", \"BEART\", \"A7TXSXSO\"     Type & Screen (If Applicable):  No results found for: \"LABABO\", \"LABRH\"    Drug/Infectious Status (If Applicable):  No results found for: \"HIV\", \"HEPCAB\"    COVID-19 Screening (If Applicable): No results found for: \"COVID19\"        Anesthesia Evaluation  Patient summary reviewed and Nursing notes reviewed   no history of anesthetic complications:   Airway: Mallampati: II  TM distance: <3 FB   Neck ROM: full  Mouth opening: > = 3 FB   Dental: normal exam         Pulmonary:Negative Pulmonary ROS and normal exam                               Cardiovascular:    (+) hypertension:                  Neuro/Psych:   (+) neuromuscular disease:            GI/Hepatic/Renal:   (+) GERD:          Endo/Other:    (+) hypothyroidism::..                 Abdominal: normal exam            Vascular: negative vascular ROS.         Other Findings:       Anesthesia Plan      MAC     ASA 2       Induction: intravenous.      Anesthetic plan and risks discussed with patient.                    Cristobal Gaspar MD   2/14/2024

## 2024-02-14 NOTE — H&P
ORTHOPEDIC SURGERY      both hips replaced, carpal tunnel b/l,     OTHER SURGICAL HISTORY      ovary removed    OTHER SURGICAL HISTORY  01/24/2019    Exploratory Lap    SMALL INTESTINE SURGERY  3/6/20    TUBAL LIGATION  1991    UPPER GASTROINTESTINAL ENDOSCOPY  02/07/2020      Family History   Problem Relation Age of Onset    Diabetes Brother         Type 2    Hypertension Sister     Other Sister         hypercholesterolemia    Other Father         CHF    Cancer Father         skin    Hearing Loss Father     Heart Attack Father     Heart Disease Father     High Blood Pressure Father     High Cholesterol Father     Cancer Mother         lung    Osteoarthritis Mother     High Blood Pressure Mother     Rheum Arthritis Mother      Social History     Tobacco Use    Smoking status: Never    Smokeless tobacco: Never   Substance Use Topics    Alcohol use: Never      Prior to Admission medications    Medication Sig Start Date End Date Taking? Authorizing Provider   diclofenac (VOLTAREN) 75 MG EC tablet TAKE 1 TABLET BY MOUTH NIGHTLY 2/6/24   Yodit Camacho MD   levothyroxine (SYNTHROID) 50 MCG tablet TAKE 1 TABLET BY MOUTH DAILY 1/16/24   Yodit Camacho MD   lisinopril-hydroCHLOROthiazide (PRINZIDE;ZESTORETIC) 20-12.5 MG per tablet Take 1 tablet by mouth daily 1/3/24   Yodit Camacho MD   L-Theanine 200 MG CAPS  10/1/23   Nadya Mayberry MD   Melatonin 10 MG TABS Take 10 mg by mouth daily 9/11/23   Nadya Mayberry MD   ferrous sulfate (FEROSUL) 325 (65 Fe) MG tablet Take 1 tablet by mouth 2 times daily 11/20/23   Yodit Camacho MD   dicyclomine (BENTYL) 20 MG tablet Take 1 tablet by mouth as needed Patient only takes 1 daily as needed    Nadya Mayberry MD   amLODIPine (NORVASC) 5 MG tablet Take 1 tablet by mouth daily 7/25/23   Yodit Camacho MD   ezetimibe (ZETIA) 10 MG tablet Take 1 tablet by mouth daily    Nadya Mayberry MD   Multiple Vitamin (MULTIVITAMIN ADULT PO) Take by mouth

## 2024-02-20 ENCOUNTER — OFFICE VISIT (OUTPATIENT)
Facility: CLINIC | Age: 74
End: 2024-02-20
Payer: MEDICARE

## 2024-02-20 VITALS
HEART RATE: 67 BPM | RESPIRATION RATE: 18 BRPM | DIASTOLIC BLOOD PRESSURE: 68 MMHG | WEIGHT: 146.4 LBS | HEIGHT: 63 IN | OXYGEN SATURATION: 95 % | BODY MASS INDEX: 25.94 KG/M2 | TEMPERATURE: 97.9 F | SYSTOLIC BLOOD PRESSURE: 126 MMHG

## 2024-02-20 DIAGNOSIS — R11.0 NAUSEA: ICD-10-CM

## 2024-02-20 DIAGNOSIS — J06.9 VIRAL URI: Primary | ICD-10-CM

## 2024-02-20 DIAGNOSIS — M13.0 POLYARTHRITIS: ICD-10-CM

## 2024-02-20 DIAGNOSIS — I10 ESSENTIAL HYPERTENSION: ICD-10-CM

## 2024-02-20 DIAGNOSIS — E03.9 HYPOTHYROIDISM, UNSPECIFIED TYPE: ICD-10-CM

## 2024-02-20 DIAGNOSIS — E78.5 HYPERLIPIDEMIA, UNSPECIFIED HYPERLIPIDEMIA TYPE: ICD-10-CM

## 2024-02-20 DIAGNOSIS — R76.8 POSITIVE AUTOANTIBODY SCREENING FOR CELIAC DISEASE: ICD-10-CM

## 2024-02-20 DIAGNOSIS — G89.29 CHRONIC ABDOMINAL PAIN: ICD-10-CM

## 2024-02-20 DIAGNOSIS — R10.9 CHRONIC ABDOMINAL PAIN: ICD-10-CM

## 2024-02-20 DIAGNOSIS — E61.1 IRON DEFICIENCY: ICD-10-CM

## 2024-02-20 PROCEDURE — 3078F DIAST BP <80 MM HG: CPT | Performed by: FAMILY MEDICINE

## 2024-02-20 PROCEDURE — G8427 DOCREV CUR MEDS BY ELIG CLIN: HCPCS | Performed by: FAMILY MEDICINE

## 2024-02-20 PROCEDURE — G8399 PT W/DXA RESULTS DOCUMENT: HCPCS | Performed by: FAMILY MEDICINE

## 2024-02-20 PROCEDURE — 1036F TOBACCO NON-USER: CPT | Performed by: FAMILY MEDICINE

## 2024-02-20 PROCEDURE — G8484 FLU IMMUNIZE NO ADMIN: HCPCS | Performed by: FAMILY MEDICINE

## 2024-02-20 PROCEDURE — 3017F COLORECTAL CA SCREEN DOC REV: CPT | Performed by: FAMILY MEDICINE

## 2024-02-20 PROCEDURE — G8419 CALC BMI OUT NRM PARAM NOF/U: HCPCS | Performed by: FAMILY MEDICINE

## 2024-02-20 PROCEDURE — 1123F ACP DISCUSS/DSCN MKR DOCD: CPT | Performed by: FAMILY MEDICINE

## 2024-02-20 PROCEDURE — 1090F PRES/ABSN URINE INCON ASSESS: CPT | Performed by: FAMILY MEDICINE

## 2024-02-20 PROCEDURE — 99214 OFFICE O/P EST MOD 30 MIN: CPT | Performed by: FAMILY MEDICINE

## 2024-02-20 PROCEDURE — 3074F SYST BP LT 130 MM HG: CPT | Performed by: FAMILY MEDICINE

## 2024-02-20 RX ORDER — ONDANSETRON 4 MG/1
4 TABLET, ORALLY DISINTEGRATING ORAL EVERY 12 HOURS PRN
Qty: 10 TABLET | Refills: 0 | Status: SHIPPED | OUTPATIENT
Start: 2024-02-20

## 2024-02-20 RX ORDER — AMLODIPINE BESYLATE 5 MG/1
5 TABLET ORAL DAILY
Qty: 90 TABLET | Refills: 3 | Status: SHIPPED | OUTPATIENT
Start: 2024-02-20

## 2024-02-20 ASSESSMENT — PATIENT HEALTH QUESTIONNAIRE - PHQ9
2. FEELING DOWN, DEPRESSED OR HOPELESS: 0
SUM OF ALL RESPONSES TO PHQ QUESTIONS 1-9: 0
1. LITTLE INTEREST OR PLEASURE IN DOING THINGS: 0
SUM OF ALL RESPONSES TO PHQ9 QUESTIONS 1 & 2: 0
SUM OF ALL RESPONSES TO PHQ QUESTIONS 1-9: 0

## 2024-02-20 NOTE — PROGRESS NOTES
Yodit Yoo (: 1950) is a 73 y.o. female here for evaluation of the following chief concern(s):  Acute and chronic condition management     ASSESSMENT/PLAN:  1. Viral URI  2. Positive autoantibody screening for celiac disease  3. Polyarthritis  4. Chronic abdominal pain  -     Comprehensive Metabolic Panel; Future  5. Nausea  -     ondansetron (ZOFRAN-ODT) 4 MG disintegrating tablet; Take 1 tablet by mouth every 12 hours as needed for Nausea or Vomiting, Disp-10 tablet, R-0Normal  -     Comprehensive Metabolic Panel; Future  6. Essential hypertension  -     amLODIPine (NORVASC) 5 MG tablet; Take 1 tablet by mouth daily, Disp-90 tablet, R-3Normal  -     Comprehensive Metabolic Panel; Future  7. Iron deficiency  -     CBC with Auto Differential; Future  -     Ferritin; Future  -     Iron and TIBC; Future  8. Hypothyroidism, unspecified type  -     TSH; Future  9. Hyperlipidemia, unspecified hyperlipidemia type  -     Lipid Panel; Future    Mrs. Yoo appears medically stable, afebrile, normotensive/normal hemodynamics, no hypoxia.    Continue supportive care/rest/recuperation for probable viral URI, ER precautions reviewed.    I advised that she may call to follow-up with hematology and specifically ask opinion regarding possibility of porphyria to explain her chronic nonspecific consolation of intermittent symptoms (abd pain, constipation, myalgias/limb pain, hypertension), autoimmune screening tests negative, though hx of ?TRAPS diagnosis, current working dx is celiac disease.  Appreciate your care GI.  Future visits may reconsider referral to Rheumatology for possible seronegative rheumatologic disease, strong family hx of autoimmune disease.      She is clinically euthyroid, continue to trend TSH ~Q6 months.    Continue current medication regimen for stable chronic conditions as above.     Future visit to consider thyroid ultrasound.     Return in about 6 months (around 2024) for follow-up

## 2024-02-20 NOTE — PROGRESS NOTES
Chief Complaint   Patient presents with    Follow-up     Chronic disease   Patient here for follow up,however,states she started last Thursday night with chest congestion, cough, sore throat. She is taking OTC cold meds and tesselon pearls at night. Mild body aches per patient, has not tested for Covid. Resfuses testing for Flu and Covid.    \"Have you been to the ER, urgent care clinic since your last visit?  Hospitalized since your last visit?\"    NO    “Have you seen or consulted any other health care providers outside of [facility] since your last visit?”    NO       Have you had a mammogram?”   NO  Patient refuses mammogram

## 2024-04-12 DIAGNOSIS — E03.9 HYPOTHYROIDISM, UNSPECIFIED TYPE: ICD-10-CM

## 2024-04-12 DIAGNOSIS — M13.0 POLYARTHRITIS: ICD-10-CM

## 2024-04-15 RX ORDER — DICLOFENAC SODIUM 75 MG/1
TABLET, DELAYED RELEASE ORAL
Qty: 60 TABLET | Refills: 0 | Status: SHIPPED | OUTPATIENT
Start: 2024-04-15

## 2024-04-15 RX ORDER — LEVOTHYROXINE SODIUM 0.05 MG/1
50 TABLET ORAL DAILY
Qty: 90 TABLET | Refills: 0 | Status: SHIPPED | OUTPATIENT
Start: 2024-04-15

## 2024-05-15 RX ORDER — LISINOPRIL AND HYDROCHLOROTHIAZIDE 20; 12.5 MG/1; MG/1
1 TABLET ORAL DAILY
Qty: 90 TABLET | Refills: 0 | Status: SHIPPED | OUTPATIENT
Start: 2024-05-15

## 2024-06-07 DIAGNOSIS — M13.0 POLYARTHRITIS: ICD-10-CM

## 2024-06-10 RX ORDER — DICLOFENAC SODIUM 75 MG/1
TABLET, DELAYED RELEASE ORAL
Qty: 30 TABLET | Refills: 0 | Status: SHIPPED | OUTPATIENT
Start: 2024-06-10

## 2024-06-17 DIAGNOSIS — E03.9 HYPOTHYROIDISM, UNSPECIFIED TYPE: ICD-10-CM

## 2024-06-17 RX ORDER — LEVOTHYROXINE SODIUM 0.05 MG/1
50 TABLET ORAL DAILY
Qty: 90 TABLET | Refills: 0 | Status: SHIPPED | OUTPATIENT
Start: 2024-06-17

## 2024-06-21 ENCOUNTER — HOSPITAL ENCOUNTER (OUTPATIENT)
Age: 74
End: 2024-06-21
Payer: MEDICARE

## 2024-06-21 ENCOUNTER — OFFICE VISIT (OUTPATIENT)
Dept: FAMILY MEDICINE CLINIC | Facility: CLINIC | Age: 74
End: 2024-06-21

## 2024-06-21 VITALS
RESPIRATION RATE: 16 BRPM | HEART RATE: 60 BPM | WEIGHT: 139.8 LBS | HEIGHT: 63 IN | DIASTOLIC BLOOD PRESSURE: 74 MMHG | BODY MASS INDEX: 24.77 KG/M2 | OXYGEN SATURATION: 98 % | SYSTOLIC BLOOD PRESSURE: 166 MMHG | TEMPERATURE: 97.5 F

## 2024-06-21 DIAGNOSIS — M79.10 MYALGIA: Primary | ICD-10-CM

## 2024-06-21 DIAGNOSIS — M79.10 MYALGIA: ICD-10-CM

## 2024-06-21 LAB
ALBUMIN SERPL-MCNC: 3.2 G/DL (ref 3.4–5)
ALBUMIN/GLOB SERPL: 1 (ref 0.8–1.7)
ALP SERPL-CCNC: 59 U/L (ref 45–117)
ALT SERPL-CCNC: 26 U/L (ref 13–56)
ANION GAP SERPL CALC-SCNC: 6 MMOL/L (ref 3–18)
AST SERPL W P-5'-P-CCNC: 25 U/L (ref 10–38)
BASOPHILS # BLD: 0 K/UL (ref 0–0.1)
BASOPHILS NFR BLD: 1 % (ref 0–2)
BILIRUB SERPL-MCNC: 0.2 MG/DL (ref 0.2–1)
BUN SERPL-MCNC: 16 MG/DL (ref 7–18)
BUN/CREAT SERPL: 21 (ref 12–20)
CA-I BLD-MCNC: 9.2 MG/DL (ref 8.5–10.1)
CHLORIDE SERPL-SCNC: 101 MMOL/L (ref 100–111)
CO2 SERPL-SCNC: 30 MMOL/L (ref 21–32)
CREAT SERPL-MCNC: 0.78 MG/DL (ref 0.6–1.3)
DIFFERENTIAL METHOD BLD: ABNORMAL
EOSINOPHIL # BLD: 0.1 K/UL (ref 0–0.4)
EOSINOPHIL NFR BLD: 2 % (ref 0–5)
ERYTHROCYTE [DISTWIDTH] IN BLOOD BY AUTOMATED COUNT: 13.3 % (ref 11.6–14.5)
GLOBULIN SER CALC-MCNC: 3.3 G/DL (ref 2–4)
GLUCOSE SERPL-MCNC: 95 MG/DL (ref 74–99)
HCT VFR BLD AUTO: 34.6 % (ref 35–45)
HGB BLD-MCNC: 11.4 G/DL (ref 12–16)
IMM GRANULOCYTES # BLD AUTO: 0 K/UL (ref 0–0.04)
IMM GRANULOCYTES NFR BLD AUTO: 0 % (ref 0–0.5)
LYMPHOCYTES # BLD: 1.3 K/UL (ref 0.9–3.6)
LYMPHOCYTES NFR BLD: 22 % (ref 21–52)
MAGNESIUM SERPL-MCNC: 2.2 MG/DL (ref 1.6–2.6)
MCH RBC QN AUTO: 30.2 PG (ref 24–34)
MCHC RBC AUTO-ENTMCNC: 32.9 G/DL (ref 31–37)
MCV RBC AUTO: 91.8 FL (ref 78–100)
MONOCYTES # BLD: 0.5 K/UL (ref 0.05–1.2)
MONOCYTES NFR BLD: 8 % (ref 3–10)
NEUTS SEG # BLD: 3.9 K/UL (ref 1.8–8)
NEUTS SEG NFR BLD: 67 % (ref 40–73)
NRBC # BLD: 0 K/UL (ref 0–0.01)
NRBC BLD-RTO: 0 PER 100 WBC
PLATELET # BLD AUTO: 366 K/UL (ref 135–420)
PMV BLD AUTO: 9.8 FL (ref 9.2–11.8)
POTASSIUM SERPL-SCNC: 3.7 MMOL/L (ref 3.5–5.5)
PROT SERPL-MCNC: 6.5 G/DL (ref 6.4–8.2)
RBC # BLD AUTO: 3.77 M/UL (ref 4.2–5.3)
SODIUM SERPL-SCNC: 137 MMOL/L (ref 136–145)
WBC # BLD AUTO: 5.8 K/UL (ref 4.6–13.2)

## 2024-06-21 PROCEDURE — 83516 IMMUNOASSAY NONANTIBODY: CPT

## 2024-06-21 PROCEDURE — 36415 COLL VENOUS BLD VENIPUNCTURE: CPT

## 2024-06-21 PROCEDURE — 86235 NUCLEAR ANTIGEN ANTIBODY: CPT

## 2024-06-21 PROCEDURE — 86225 DNA ANTIBODY NATIVE: CPT

## 2024-06-21 PROCEDURE — 85025 COMPLETE CBC W/AUTO DIFF WBC: CPT

## 2024-06-21 PROCEDURE — 80053 COMPREHEN METABOLIC PANEL: CPT

## 2024-06-21 PROCEDURE — 83735 ASSAY OF MAGNESIUM: CPT

## 2024-06-21 PROCEDURE — 82607 VITAMIN B-12: CPT

## 2024-06-21 RX ORDER — TURMERIC 400 MG
CAPSULE ORAL
COMMUNITY

## 2024-06-21 RX ORDER — FAMOTIDINE 20 MG/1
TABLET, FILM COATED ORAL
COMMUNITY
Start: 2024-06-13

## 2024-06-21 RX ORDER — PREDNISONE 10 MG/1
TABLET ORAL
Qty: 1 EACH | Refills: 0 | Status: SHIPPED | OUTPATIENT
Start: 2024-06-21 | End: 2024-07-01 | Stop reason: ALTCHOICE

## 2024-06-21 SDOH — ECONOMIC STABILITY: FOOD INSECURITY: WITHIN THE PAST 12 MONTHS, YOU WORRIED THAT YOUR FOOD WOULD RUN OUT BEFORE YOU GOT MONEY TO BUY MORE.: NEVER TRUE

## 2024-06-21 SDOH — ECONOMIC STABILITY: INCOME INSECURITY: HOW HARD IS IT FOR YOU TO PAY FOR THE VERY BASICS LIKE FOOD, HOUSING, MEDICAL CARE, AND HEATING?: NOT HARD AT ALL

## 2024-06-21 SDOH — ECONOMIC STABILITY: FOOD INSECURITY: WITHIN THE PAST 12 MONTHS, THE FOOD YOU BOUGHT JUST DIDN'T LAST AND YOU DIDN'T HAVE MONEY TO GET MORE.: NEVER TRUE

## 2024-06-21 NOTE — PROGRESS NOTES
Yodit Yoo presents today for   Chief Complaint   Patient presents with    Follow-up       Is someone accompanying this pt? no    Is the patient using any DME equipment during OV? no    Depression Screenin/20/2024     8:17 AM 2023     8:31 AM 2023     8:36 AM 2023    11:08 AM 7/10/2023     1:53 PM 2023     9:39 AM 2023     2:49 PM   PHQ-9 Questionaire   Little interest or pleasure in doing things 0 0 0 0 0 0 0   Feeling down, depressed, or hopeless 0 0 0 0 0 0 0   PHQ-9 Total Score 0 0 0 0 0 0 0       Fall Risk      2023     8:36 AM 2023    11:08 AM 7/10/2023     1:53 PM 2023     9:40 AM 2023     2:49 PM   Fall Risk   2 or more falls in past year? no no no no no   Fall with injury in past year? no no no no no        Health Maintenance reviewed and discussed and ordered per Provider.    Health Maintenance Due   Topic Date Due    COVID-19 Vaccine (1) Never done    Hepatitis C screen  Never done    DTaP/Tdap/Td vaccine (1 - Tdap) Never done    Shingles vaccine (1 of 2) Never done    Respiratory Syncytial Virus (RSV) Pregnant or age 60 yrs+ (1 - 1-dose 60+ series) Never done    Pneumococcal 65+ years Vaccine (1 of 1 - PCV) Never done    Breast cancer screen  2022    Annual Wellness Visit (Medicare)  2023   .        \"Have you been to the ER, urgent care clinic since your last visit?  Hospitalized since your last visit?\"    NO    “Have you seen or consulted any other health care providers outside of [facility] since your last visit?”    NO       Have you had a mammogram?”   NO    Date of last Mammogram: 2020

## 2024-06-23 LAB — VIT B12 SERPL-MCNC: 786 PG/ML (ref 211–911)

## 2024-06-24 LAB
CENTROMERE B AB SER-ACNC: <0.2 AI (ref 0–0.9)
CHROMATIN AB SERPL-ACNC: <0.2 AI (ref 0–0.9)
DSDNA AB SER-ACNC: <1 IU/ML (ref 0–9)
ENA JO1 AB SER-ACNC: <0.2 AI (ref 0–0.9)
ENA RNP AB SER-ACNC: <0.2 AI (ref 0–0.9)
ENA SCL70 AB SER-ACNC: <0.2 AI (ref 0–0.9)
ENA SM AB SER-ACNC: <0.2 AI (ref 0–0.9)
ENA SM+RNP AB SER-ACNC: <0.2 AI (ref 0–0.9)
ENA SS-A AB SER-ACNC: <0.2 AI (ref 0–0.9)
ENA SS-B AB SER-ACNC: <0.2 AI (ref 0–0.9)
RIBOSOMAL P AB SER-ACNC: <0.2 AI (ref 0–0.9)
SEE BELOW:, 164879: NORMAL

## 2024-06-25 DIAGNOSIS — E03.9 HYPOTHYROIDISM, UNSPECIFIED TYPE: ICD-10-CM

## 2024-06-25 RX ORDER — LEVOTHYROXINE SODIUM 0.05 MG/1
50 TABLET ORAL DAILY
Qty: 90 TABLET | Refills: 0 | OUTPATIENT
Start: 2024-06-25

## 2024-07-01 ENCOUNTER — OFFICE VISIT (OUTPATIENT)
Facility: CLINIC | Age: 74
End: 2024-07-01

## 2024-07-01 ENCOUNTER — HOSPITAL ENCOUNTER (OUTPATIENT)
Age: 74
Setting detail: SPECIMEN
Discharge: HOME | End: 2024-07-04
Payer: MEDICARE

## 2024-07-01 VITALS
WEIGHT: 140.2 LBS | HEART RATE: 58 BPM | OXYGEN SATURATION: 99 % | SYSTOLIC BLOOD PRESSURE: 133 MMHG | RESPIRATION RATE: 18 BRPM | BODY MASS INDEX: 24.84 KG/M2 | DIASTOLIC BLOOD PRESSURE: 70 MMHG | HEIGHT: 63 IN | TEMPERATURE: 97.3 F

## 2024-07-01 DIAGNOSIS — E55.9 VITAMIN D DEFICIENCY: ICD-10-CM

## 2024-07-01 DIAGNOSIS — G25.81 RESTLESS LEG SYNDROME: ICD-10-CM

## 2024-07-01 DIAGNOSIS — E03.9 HYPOTHYROIDISM, UNSPECIFIED TYPE: ICD-10-CM

## 2024-07-01 DIAGNOSIS — I10 ESSENTIAL HYPERTENSION: ICD-10-CM

## 2024-07-01 DIAGNOSIS — R53.83 OTHER FATIGUE: ICD-10-CM

## 2024-07-01 DIAGNOSIS — G89.29 CHRONIC ABDOMINAL PAIN: ICD-10-CM

## 2024-07-01 DIAGNOSIS — R53.82 CHRONIC FATIGUE: Primary | ICD-10-CM

## 2024-07-01 DIAGNOSIS — M13.0 POLYARTHROPATHY: ICD-10-CM

## 2024-07-01 DIAGNOSIS — R76.8 POSITIVE AUTOANTIBODY SCREENING FOR CELIAC DISEASE: ICD-10-CM

## 2024-07-01 DIAGNOSIS — R10.9 CHRONIC ABDOMINAL PAIN: ICD-10-CM

## 2024-07-01 DIAGNOSIS — M04.1 TRAPS (TUMOR NECROSIS FACTOR RECEPTORS ASSOCIATED PERIODIC SYNDROME) (HCC): ICD-10-CM

## 2024-07-01 DIAGNOSIS — G89.4 CHRONIC PAIN SYNDROME: ICD-10-CM

## 2024-07-01 LAB — TSH SERPL DL<=0.05 MIU/L-ACNC: 1.25 UIU/ML (ref 0.36–3.74)

## 2024-07-01 PROCEDURE — 82306 VITAMIN D 25 HYDROXY: CPT

## 2024-07-01 PROCEDURE — 84443 ASSAY THYROID STIM HORMONE: CPT

## 2024-07-01 PROCEDURE — 83540 ASSAY OF IRON: CPT

## 2024-07-01 PROCEDURE — 82728 ASSAY OF FERRITIN: CPT

## 2024-07-01 PROCEDURE — 80061 LIPID PANEL: CPT

## 2024-07-01 RX ORDER — TRAMADOL HYDROCHLORIDE 50 MG/1
50 TABLET ORAL DAILY PRN
Qty: 10 TABLET | Refills: 0 | Status: SHIPPED | OUTPATIENT
Start: 2024-07-01 | End: 2024-07-15

## 2024-07-01 RX ORDER — MOMETASONE FUROATE MONOHYDRATE 50 UG/1
2 SPRAY, METERED NASAL DAILY
COMMUNITY

## 2024-07-01 ASSESSMENT — PATIENT HEALTH QUESTIONNAIRE - PHQ9
1. LITTLE INTEREST OR PLEASURE IN DOING THINGS: NOT AT ALL
SUM OF ALL RESPONSES TO PHQ QUESTIONS 1-9: 0
SUM OF ALL RESPONSES TO PHQ9 QUESTIONS 1 & 2: 0
SUM OF ALL RESPONSES TO PHQ QUESTIONS 1-9: 0
2. FEELING DOWN, DEPRESSED OR HOPELESS: NOT AT ALL

## 2024-07-01 NOTE — PROGRESS NOTES
Yodit Yoo (: 1950) is a 73 y.o. female here for evaluation of the following chief concern(s):  Acute and chronic condition management     ASSESSMENT/PLAN:  1. Chronic fatigue  -     External Referral To Rheumatology  2. Chronic pain syndrome  -     External Referral To Pain Clinic  3. Polyarthropathy  -     External Referral To Rheumatology  -     traMADol (ULTRAM) 50 MG tablet; Take 1 tablet by mouth daily as needed for Pain for up to 14 days. Intended supply: 3 days. Take lowest dose possible to manage pain Max Daily Amount: 50 mg, Disp-10 tablet, R-0Normal  4. Chronic abdominal pain  -     External Referral To Rheumatology  5. Positive autoantibody screening for celiac disease  6. Other fatigue  -     Vitamin D 25 Hydroxy; Future  7. Restless leg syndrome  -     External Referral To Rheumatology  8. Hypothyroidism, unspecified type  9. Vitamin D deficiency  -     Vitamin D 25 Hydroxy; Future  10. 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 Essential hypertension    Mrs. Yoo appears medically stable.     Ddx chronic nonspecific consolation of intermittent symptoms (abd pain, constipation, myalgias/limb pain, hypertension), autoimmune screening tests negative, though hx of ?TRAPS diagnosis, current working dx is celiac disease.  DDx porphyria, seronegative rheumatologic disease, strong family hx of autoimmune disease.  Re-refer to Rheumatology and pt may also benefit from Sleep Medicine eval.  Will provide a limited script for Tramadol for her acute on chronic pain symptoms and refer to Pain Management for further evaluation.     Outstanding labs pulled in office today.      Future visit to consider thyroid ultrasound.     No follow-ups on file. We reviewed plans for ongoing coverage for our office during my upcoming maternity leave starting ~2024.      Yodit Yoo agrees with plan as above and has no additional

## 2024-07-01 NOTE — PROGRESS NOTES
Chief Complaint   Patient presents with    Fatigue   Verbal order from Yodit Camacho MD to order labs/sign and draw them in office    Labs were drawn and sent to [facility] by Dinora Wasserman LPN:    The following tubes were sent:    0 Lavendar, 0 Red, 3 SST, 0 Urine    Draw site right antecubital.  Patient tolerated draw with no distress.     \"Have you been to the ER, urgent care clinic since your last visit?  Hospitalized since your last visit?\"    NO    “Have you seen or consulted any other health care providers outside of [facility] since your last visit?”    NO       Have you had a mammogram?”   NO    Patient refuses mammogram

## 2024-07-02 LAB
25(OH)D3 SERPL-MCNC: 58.7 NG/ML (ref 30–100)
CHOLEST SERPL-MCNC: 194 MG/DL
FERRITIN SERPL-MCNC: 32 NG/ML (ref 8–388)
HDLC SERPL-MCNC: 69 MG/DL (ref 40–60)
HDLC SERPL: 2.8 (ref 0–5)
IRON SATN MFR SERPL: 26 % (ref 20–50)
IRON SERPL-MCNC: 79 UG/DL (ref 50–175)
LDLC SERPL CALC-MCNC: 102.2 MG/DL (ref 0–100)
LIPID PANEL: ABNORMAL
TIBC SERPL-MCNC: 305 UG/DL (ref 250–450)
TRIGL SERPL-MCNC: 114 MG/DL
VLDLC SERPL CALC-MCNC: 22.8 MG/DL

## 2024-07-09 ENCOUNTER — TELEMEDICINE (OUTPATIENT)
Facility: CLINIC | Age: 74
End: 2024-07-09
Payer: MEDICARE

## 2024-07-09 ENCOUNTER — TELEPHONE (OUTPATIENT)
Facility: CLINIC | Age: 74
End: 2024-07-09

## 2024-07-09 DIAGNOSIS — R76.8 POSITIVE AUTOANTIBODY SCREENING FOR CELIAC DISEASE: ICD-10-CM

## 2024-07-09 DIAGNOSIS — G89.29 CHRONIC ABDOMINAL PAIN: ICD-10-CM

## 2024-07-09 DIAGNOSIS — R53.82 CHRONIC FATIGUE: ICD-10-CM

## 2024-07-09 DIAGNOSIS — G89.4 CHRONIC PAIN SYNDROME: ICD-10-CM

## 2024-07-09 DIAGNOSIS — M13.0 POLYARTHROPATHY: ICD-10-CM

## 2024-07-09 DIAGNOSIS — Z88.9 MULTIPLE ALLERGIES: Primary | ICD-10-CM

## 2024-07-09 DIAGNOSIS — E03.9 HYPOTHYROIDISM, UNSPECIFIED TYPE: ICD-10-CM

## 2024-07-09 DIAGNOSIS — G25.81 RESTLESS LEG SYNDROME: ICD-10-CM

## 2024-07-09 DIAGNOSIS — R10.9 CHRONIC ABDOMINAL PAIN: ICD-10-CM

## 2024-07-09 PROCEDURE — 99423 OL DIG E/M SVC 21+ MIN: CPT | Performed by: FAMILY MEDICINE

## 2024-07-09 RX ORDER — MONTELUKAST SODIUM 10 MG/1
10 TABLET ORAL NIGHTLY
Qty: 30 TABLET | Refills: 1 | Status: SHIPPED | OUTPATIENT
Start: 2024-07-09

## 2024-07-09 ASSESSMENT — PATIENT HEALTH QUESTIONNAIRE - PHQ9
1. LITTLE INTEREST OR PLEASURE IN DOING THINGS: NOT AT ALL
SUM OF ALL RESPONSES TO PHQ9 QUESTIONS 1 & 2: 0
SUM OF ALL RESPONSES TO PHQ QUESTIONS 1-9: 0
2. FEELING DOWN, DEPRESSED OR HOPELESS: NOT AT ALL
SUM OF ALL RESPONSES TO PHQ QUESTIONS 1-9: 0

## 2024-07-09 NOTE — TELEPHONE ENCOUNTER
----- Message from Dinora Wasserman LPN sent at 7/8/2024  7:52 AM EDT -----  Regarding: referral  Contact: [phone]   message.      ----- Message -----  From: Yodit Yoo  Sent: 7/6/2024   4:55 PM EDT  To: [facility] Clinical Staff  Subject: referral                                         Holly,  I looked up the [facility], but I couldn't find any information about a [facility] or how to make an appointment there. Only the [place] location. I'm not comfortable with driving to [place], but if that's what it takes to get seen, I'll do what I have to.  Thank you,  Brianne

## 2024-07-09 NOTE — PROGRESS NOTES
Patient refuses Mammogram and Medicare Wellness.     Chief Complaint   Patient presents with    Follow-up     1 week       \"Have you been to the ER, urgent care clinic since your last visit?  Hospitalized since your last visit?\"    NO    “Have you seen or consulted any other health care providers outside of [facility] since your last visit?”    NO            
Chicken Allergy Nausea And Vomiting    Doxycycline Anxiety       There were no vitals taken for this visit.    Physical exam:  GEN: Awake and alert in NAD, baseline voice/speech.  PULM: Unlabored respirations, no cough.    Lab Results   Component Value Date/Time    WBC 5.8 06/21/2024 04:37 PM    HGB 11.4 06/21/2024 04:37 PM    HCT 34.6 06/21/2024 04:37 PM     06/21/2024 04:37 PM    MCV 91.8 06/21/2024 04:37 PM     Lab Results   Component Value Date/Time     06/21/2024 04:37 PM    K 3.7 06/21/2024 04:37 PM     06/21/2024 04:37 PM    CO2 30 06/21/2024 04:37 PM    BUN 16 06/21/2024 04:37 PM    GFRAA >60 06/08/2021 08:20 AM    GLOB 3.3 06/21/2024 04:37 PM    ALT 26 06/21/2024 04:37 PM    AST 25 06/21/2024 04:37 PM     Lab Results   Component Value Date/Time    CHOL 194 07/01/2024 10:40 AM    HDL 69 07/01/2024 10:40 AM    .2 07/01/2024 10:40 AM     06/22/2023 09:22 AM    VLDL 22.8 07/01/2024 10:40 AM       Yodit Yoo, was evaluated through a synchronous (real-time) audio-video encounter. The patient (or guardian if applicable) is aware that this is a billable service, which includes applicable co-pays. This Virtual Visit was conducted with patient's (and/or legal guardian's) consent. Patient identification was verified, and a caregiver was present when appropriate.   The patient was located at Home: [address] [place] [address] [place] [address]  Provider was located at Facility (Appt Dept): [address] [place] [address] [place]  [place] [address]  Confirm you are appropriately licensed, registered, or certified to deliver care in the state where the patient is located as indicated above. If you are not or unsure, please re-schedule the visit: Yes, I confirm.      Total time spent for this encounter:  >21 minutes in total including chart review, face to face discussion, care coordination, documentation on same day as service.      Yodit Camacho MD   Family & Geriatric Medicine

## 2024-07-15 DIAGNOSIS — M13.0 POLYARTHRITIS: ICD-10-CM

## 2024-07-17 DIAGNOSIS — M13.0 POLYARTHRITIS: ICD-10-CM

## 2024-07-18 RX ORDER — DICLOFENAC SODIUM 75 MG/1
75 TABLET, DELAYED RELEASE ORAL NIGHTLY
Qty: 30 TABLET | Refills: 0 | Status: SHIPPED | OUTPATIENT
Start: 2024-07-18

## 2024-07-19 ASSESSMENT — ENCOUNTER SYMPTOMS: RESPIRATORY NEGATIVE: 1

## 2024-07-19 NOTE — PROGRESS NOTES
Yodit Yoo is a 73 y.o. female presents with   Chief Complaint   Patient presents with    Follow-up        Diagnosis   1. Myalgia    Patient presents today for a follow up and would like to discuss myalgia that has been going on for some time.                     BP (!) 166/74 (Site: Left Upper Arm, Position: Sitting, Cuff Size: Medium Adult)   Pulse 60   Temp 97.5 °F (36.4 °C) (Temporal)   Resp 16   Ht 1.6 m (5' 3\")   Wt 63.4 kg (139 lb 12.8 oz)   SpO2 98%   BMI 24.76 kg/m²   Subjective:     Past Medical History:   Diagnosis Date    Abdominal pain     Allergies     Arthritis     Bowel obstruction (HCC) 2019    stricture small bowel    Diarrhea     GERD (gastroesophageal reflux disease) 2018    High cholesterol     Hypertension     Hypothyroidism approx. 10 years ago    low TSH    Kidney disease     Nausea & vomiting     Osteoarthritis approx. 20 years ago    Restless legs syndrome approx. 10 years ago    Thyroid disease      Past Surgical History:   Procedure Laterality Date    BREAST SURGERY Right     biopsyr    CAPSULE ENDOSCOPY N/A 02/25/2020    COLONOSCOPY  07/08/2016    COLONOSCOPY  02/07/2020    JOINT REPLACEMENT  2014    Bilat. hip    ORTHOPEDIC SURGERY      both hips replaced, carpal tunnel b/l,     OTHER SURGICAL HISTORY      ovary removed    OTHER SURGICAL HISTORY  01/24/2019    Exploratory Lap    SMALL INTESTINE SURGERY  3/6/20    TUBAL LIGATION  1991    UPPER GASTROINTESTINAL ENDOSCOPY  02/07/2020    UPPER GASTROINTESTINAL ENDOSCOPY N/A 2/14/2024    EGD ESOPHAGOGASTRODUODENOSCOPY W/ BXS performed by Senia Sánchez MD at [facility] ENDOSCOPY     Social History     Socioeconomic History    Marital status:      Spouse name: None    Number of children: None    Years of education: None    Highest education level: None   Tobacco Use    Smoking status: Never    Smokeless tobacco: Never   Vaping Use    Vaping Use: Never used   Substance and Sexual Activity    Alcohol use: Never    Drug use:

## 2024-07-26 ENCOUNTER — COMMUNITY OUTREACH (OUTPATIENT)
Facility: CLINIC | Age: 74
End: 2024-07-26

## 2024-08-09 RX ORDER — DICLOFENAC SODIUM 75 MG/1
TABLET, DELAYED RELEASE ORAL
Qty: 30 TABLET | Refills: 1 | Status: SHIPPED | OUTPATIENT
Start: 2024-08-09

## 2024-08-12 DIAGNOSIS — Z88.9 MULTIPLE ALLERGIES: ICD-10-CM

## 2024-08-12 RX ORDER — MONTELUKAST SODIUM 10 MG/1
10 TABLET ORAL NIGHTLY
Qty: 30 TABLET | Refills: 1 | Status: SHIPPED | OUTPATIENT
Start: 2024-08-12

## 2024-08-23 ENCOUNTER — OFFICE VISIT (OUTPATIENT)
Facility: CLINIC | Age: 74
End: 2024-08-23

## 2024-08-23 VITALS
RESPIRATION RATE: 18 BRPM | TEMPERATURE: 97 F | HEIGHT: 63 IN | WEIGHT: 137.2 LBS | SYSTOLIC BLOOD PRESSURE: 118 MMHG | HEART RATE: 62 BPM | BODY MASS INDEX: 24.31 KG/M2 | OXYGEN SATURATION: 98 % | DIASTOLIC BLOOD PRESSURE: 63 MMHG

## 2024-08-23 DIAGNOSIS — G25.81 RESTLESS LEGS: ICD-10-CM

## 2024-08-23 DIAGNOSIS — R53.82 CHRONIC FATIGUE: ICD-10-CM

## 2024-08-23 DIAGNOSIS — G89.4 CHRONIC PAIN SYNDROME: ICD-10-CM

## 2024-08-23 DIAGNOSIS — G47.00 INSOMNIA, UNSPECIFIED TYPE: ICD-10-CM

## 2024-08-23 DIAGNOSIS — R10.9 CHRONIC ABDOMINAL PAIN: ICD-10-CM

## 2024-08-23 DIAGNOSIS — G47.19 EXCESSIVE DAYTIME SLEEPINESS: ICD-10-CM

## 2024-08-23 DIAGNOSIS — I10 ESSENTIAL HYPERTENSION: Primary | ICD-10-CM

## 2024-08-23 DIAGNOSIS — G89.29 CHRONIC ABDOMINAL PAIN: ICD-10-CM

## 2024-08-23 DIAGNOSIS — M13.0 POLYARTHROPATHY: ICD-10-CM

## 2024-08-23 RX ORDER — LISINOPRIL AND HYDROCHLOROTHIAZIDE 20; 12.5 MG/1; MG/1
1 TABLET ORAL DAILY
Qty: 90 TABLET | Refills: 3 | Status: SHIPPED | OUTPATIENT
Start: 2024-08-23

## 2024-08-23 RX ORDER — ROPINIROLE 0.5 MG/1
TABLET, FILM COATED ORAL
Qty: 270 TABLET | Refills: 0 | Status: SHIPPED | OUTPATIENT
Start: 2024-08-23

## 2024-08-23 ASSESSMENT — PATIENT HEALTH QUESTIONNAIRE - PHQ9
SUM OF ALL RESPONSES TO PHQ9 QUESTIONS 1 & 2: 0
SUM OF ALL RESPONSES TO PHQ QUESTIONS 1-9: 0
SUM OF ALL RESPONSES TO PHQ QUESTIONS 1-9: 0
2. FEELING DOWN, DEPRESSED OR HOPELESS: NOT AT ALL
1. LITTLE INTEREST OR PLEASURE IN DOING THINGS: NOT AT ALL
SUM OF ALL RESPONSES TO PHQ QUESTIONS 1-9: 0
SUM OF ALL RESPONSES TO PHQ QUESTIONS 1-9: 0

## 2024-08-23 NOTE — PROGRESS NOTES
Yodit Yoo (: 1950) is a 73 y.o. female here for evaluation of the following chief concern(s):  Chronic condition management       ASSESSMENT/PLAN:  1. Essential hypertension  -     lisinopril-hydroCHLOROthiazide (PRINZIDE;ZESTORETIC) 20-12.5 MG per tablet; Take 1 tablet by mouth daily, Disp-90 tablet, R-3Normal  2. Chronic pain syndrome  3. Chronic abdominal pain  4. Polyarthropathy  5. Chronic fatigue  -     [facility] - Sterling, Tasha, DO, Sleep Medicine ([address])  6. Excessive daytime sleepiness  -     [facility] - Sterling, Tasha, DO, Sleep Medicine ([address])  7. Insomnia, unspecified type  -     [facility] - Sterling, Tasha, DO, Sleep Medicine ([address])  8. Restless legs  -     [facility] - Sterling, Tasha, DO, Sleep Medicine ([address])  -     rOPINIRole (REQUIP) 0.5 MG tablet; Take one or two tablets before bed, and can take one additional tablet if needed for symptoms after 1 hour., Disp-270 tablet, R-0Normal    Mrs. Yoo sounds medically stable.    Normal hemodynamics.  Normotensive.      Ddx chronic nonspecific consolation of intermittent symptoms exacerbated by heat and seem to have onset after a tick bite years ago (abd pain, constipation, myalgias/limb pain/RLS, hypertension, fatigue).  Autoimmune screening tests negative, though hx of ?TRAPS diagnosis, current working dx is celiac disease and other/multiple allergies- alphagal/fungus.    DDx porphyria, seronegative rheumatologic disease (strong family hx of autoimmune disease), tick borne illness, allergic/immunologic condition.    Greatly appreciate shared care w/ Rheumatology, Immunology.      Care coordination; would like to discus iron transfusions w/ Hematology physician, which I think she could benefit from as her ferritin is <75 w/ restless leg symptoms despite adherence w/ oral iron sulfate BID also given the side effect of constipation.      Will provide gentle up-titration to Ropinirole.  Appreciate Sleep Medicine consultation regarding RLS.

## 2024-08-23 NOTE — PROGRESS NOTES
Chief Complaint   Patient presents with    Follow-up     Chronic disease     Would like to increase her Ropinirole  \"Have you been to the ER, urgent care clinic since your last visit?  Hospitalized since your last visit?\"    NO    “Have you seen or consulted any other health care providers outside of [facility] since your last visit?”    RHEUMATOLOGIST       Have you had a mammogram?”   NO    Patient refuses mammogram, does not wish to do a mammogram.

## 2024-09-18 ENCOUNTER — OFFICE VISIT (OUTPATIENT)
Facility: CLINIC | Age: 74
End: 2024-09-18

## 2024-09-18 VITALS
TEMPERATURE: 97.3 F | DIASTOLIC BLOOD PRESSURE: 72 MMHG | OXYGEN SATURATION: 99 % | WEIGHT: 138.2 LBS | HEART RATE: 58 BPM | HEIGHT: 63 IN | BODY MASS INDEX: 24.49 KG/M2 | RESPIRATION RATE: 18 BRPM | SYSTOLIC BLOOD PRESSURE: 138 MMHG

## 2024-09-18 DIAGNOSIS — R10.9 CHRONIC ABDOMINAL PAIN: ICD-10-CM

## 2024-09-18 DIAGNOSIS — K90.0 CELIAC DISEASE: ICD-10-CM

## 2024-09-18 DIAGNOSIS — G89.4 CHRONIC PAIN SYNDROME: ICD-10-CM

## 2024-09-18 DIAGNOSIS — G89.29 CHRONIC ABDOMINAL PAIN: ICD-10-CM

## 2024-09-18 DIAGNOSIS — G25.81 RESTLESS LEGS: ICD-10-CM

## 2024-09-18 DIAGNOSIS — Z91.018 ALLERGY TO ALPHA-GAL: Primary | ICD-10-CM

## 2024-09-18 DIAGNOSIS — Z88.9 MULTIPLE ALLERGIES: ICD-10-CM

## 2024-09-18 DIAGNOSIS — G47.00 INSOMNIA, UNSPECIFIED TYPE: ICD-10-CM

## 2024-09-18 DIAGNOSIS — M13.0 POLYARTHROPATHY: ICD-10-CM

## 2024-09-18 ASSESSMENT — PATIENT HEALTH QUESTIONNAIRE - PHQ9
2. FEELING DOWN, DEPRESSED OR HOPELESS: NOT AT ALL
SUM OF ALL RESPONSES TO PHQ QUESTIONS 1-9: 0
SUM OF ALL RESPONSES TO PHQ9 QUESTIONS 1 & 2: 0
1. LITTLE INTEREST OR PLEASURE IN DOING THINGS: NOT AT ALL
SUM OF ALL RESPONSES TO PHQ QUESTIONS 1-9: 0

## 2024-10-01 DIAGNOSIS — E03.9 HYPOTHYROIDISM, UNSPECIFIED TYPE: ICD-10-CM

## 2024-10-02 RX ORDER — LEVOTHYROXINE SODIUM 50 UG/1
50 TABLET ORAL DAILY
Qty: 90 TABLET | Refills: 0 | Status: SHIPPED | OUTPATIENT
Start: 2024-10-02

## 2024-10-16 ENCOUNTER — OFFICE VISIT (OUTPATIENT)
Facility: CLINIC | Age: 74
End: 2024-10-16
Payer: MEDICARE

## 2024-10-16 VITALS
HEIGHT: 63 IN | HEART RATE: 79 BPM | TEMPERATURE: 97.3 F | RESPIRATION RATE: 18 BRPM | OXYGEN SATURATION: 93 % | SYSTOLIC BLOOD PRESSURE: 130 MMHG | BODY MASS INDEX: 24.59 KG/M2 | DIASTOLIC BLOOD PRESSURE: 72 MMHG | WEIGHT: 138.8 LBS

## 2024-10-16 DIAGNOSIS — I10 ESSENTIAL HYPERTENSION: ICD-10-CM

## 2024-10-16 DIAGNOSIS — Z91.018 ALLERGY TO ALPHA-GAL: ICD-10-CM

## 2024-10-16 DIAGNOSIS — D50.9 IRON DEFICIENCY ANEMIA, UNSPECIFIED IRON DEFICIENCY ANEMIA TYPE: ICD-10-CM

## 2024-10-16 DIAGNOSIS — E03.9 HYPOTHYROIDISM, UNSPECIFIED TYPE: ICD-10-CM

## 2024-10-16 DIAGNOSIS — Z88.9 MULTIPLE ALLERGIES: ICD-10-CM

## 2024-10-16 DIAGNOSIS — G25.81 RESTLESS LEGS: ICD-10-CM

## 2024-10-16 DIAGNOSIS — Z12.31 ENCOUNTER FOR SCREENING MAMMOGRAM FOR MALIGNANT NEOPLASM OF BREAST: ICD-10-CM

## 2024-10-16 DIAGNOSIS — M13.0 POLYARTHRITIS: ICD-10-CM

## 2024-10-16 DIAGNOSIS — G47.00 INSOMNIA, UNSPECIFIED TYPE: Primary | ICD-10-CM

## 2024-10-16 PROCEDURE — 3078F DIAST BP <80 MM HG: CPT | Performed by: FAMILY MEDICINE

## 2024-10-16 PROCEDURE — 99214 OFFICE O/P EST MOD 30 MIN: CPT | Performed by: FAMILY MEDICINE

## 2024-10-16 PROCEDURE — 1126F AMNT PAIN NOTED NONE PRSNT: CPT | Performed by: FAMILY MEDICINE

## 2024-10-16 PROCEDURE — 1090F PRES/ABSN URINE INCON ASSESS: CPT | Performed by: FAMILY MEDICINE

## 2024-10-16 PROCEDURE — 3075F SYST BP GE 130 - 139MM HG: CPT | Performed by: FAMILY MEDICINE

## 2024-10-16 PROCEDURE — 3017F COLORECTAL CA SCREEN DOC REV: CPT | Performed by: FAMILY MEDICINE

## 2024-10-16 PROCEDURE — 1036F TOBACCO NON-USER: CPT | Performed by: FAMILY MEDICINE

## 2024-10-16 PROCEDURE — G8399 PT W/DXA RESULTS DOCUMENT: HCPCS | Performed by: FAMILY MEDICINE

## 2024-10-16 PROCEDURE — G8427 DOCREV CUR MEDS BY ELIG CLIN: HCPCS | Performed by: FAMILY MEDICINE

## 2024-10-16 PROCEDURE — G8420 CALC BMI NORM PARAMETERS: HCPCS | Performed by: FAMILY MEDICINE

## 2024-10-16 PROCEDURE — 1123F ACP DISCUSS/DSCN MKR DOCD: CPT | Performed by: FAMILY MEDICINE

## 2024-10-16 PROCEDURE — G8484 FLU IMMUNIZE NO ADMIN: HCPCS | Performed by: FAMILY MEDICINE

## 2024-10-16 RX ORDER — DICLOFENAC SODIUM 75 MG/1
TABLET, DELAYED RELEASE ORAL
Qty: 90 TABLET | Refills: 1 | Status: SHIPPED | OUTPATIENT
Start: 2024-10-16

## 2024-10-16 RX ORDER — DOXEPIN 3 MG/1
3 TABLET, FILM COATED ORAL NIGHTLY
Qty: 30 TABLET | Refills: 0 | Status: SHIPPED | OUTPATIENT
Start: 2024-10-16 | End: 2024-11-01

## 2024-10-16 RX ORDER — MONTELUKAST SODIUM 10 MG/1
10 TABLET ORAL NIGHTLY
Qty: 90 TABLET | Refills: 1 | Status: SHIPPED | OUTPATIENT
Start: 2024-10-16

## 2024-10-16 NOTE — PROGRESS NOTES
Chief Complaint   Patient presents with    Follow-up     Chronic disease   Patient refuses mammograms and flu vaccine    \"Have you been to the ER, urgent care clinic since your last visit?  Hospitalized since your last visit?\"    NO    “Have you seen or consulted any other health care providers outside of [facility] since your last visit?”    NO       Have you had a mammogram?”   NO    Date of last Mammogram: 2/7/2020

## 2024-10-16 NOTE — PROGRESS NOTES
Yodit Yoo (: 1950) is a 73 y.o. female here for evaluation of the following chief concern(s):  Chronic condition management     ASSESSMENT/PLAN:  1. Insomnia, unspecified type  -     doxepin (SILENOR) 3 MG TABS tablet; Take 1 tablet by mouth nightly, Disp-30 tablet, R-0Normal  2. Allergy to alpha-gal  3. Multiple allergies  -     montelukast (SINGULAIR) 10 MG tablet; Take 1 tablet by mouth nightly, Disp-90 tablet, R-1Normal  4. Polyarthritis  -     diclofenac (VOLTAREN) 75 MG EC tablet; TAKE 1 TABLET BY MOUTH NIGHTLY WITH FOOD IF NEEDED FOR PAIN/INFLAMMATION, Disp-90 tablet, R-1Normal  5. Encounter for screening mammogram for malignant neoplasm of breast  6. Restless legs  -     Ferritin; Future  -     Iron and TIBC; Future  7. Essential hypertension  -     Comprehensive Metabolic Panel; Future  8. Hypothyroidism, unspecified type  -     TSH; Future  9. Iron deficiency anemia, unspecified iron deficiency anemia type  -     CBC with Auto Differential; Future  -     Ferritin; Future  -     Iron and TIBC; Future    Mrs. Yoo appears medically stable.    Normotensive.      Pt opts for a trial of Doxepin for insomnia, she is aware that this is generally indicated for short-term use.  Upcoming Sleep Medicine consultation.      Refills as above.      Working diagnosis for chronic nonspecific consolation of intermittent symptoms exacerbated by heat and seem to have onset after a tick bite years ago (abd pain, constipation, myalgias/limb pain/RLS, hypertension, fatigue);;; celiac disease and other/multiple allergies- alphagal/fungus.  Hx autoimmune screening tests negative.  Symptoms improving w/ dietary restrictions.        Care coordination; would like to discuss iron transfusions w/ Hematology physician, which I think she could benefit from as her ferritin is <75 w/ restless leg symptoms despite adherence w/ oral iron sulfate BID also given the side effect of constipation.      Appreciate Sleep Medicine

## 2024-10-31 NOTE — ASSESSMENT & PLAN NOTE
Long standing history. H/o anemia, iron deficiency. Ferritin was 32 7/1/24. Taking iron supplements and Ropinirole 0.5 mg.     Was recently prescribed Doxepin, which unfortunately can worsen RLS. She did not actually get the medication due to cost though.    She was on low-dose gabapentin in the past and it made her feel \"foggy\" but it has been several years I believe since she is try that medication.    Currently she is taking valerian root, which helps her sleep but makes her feel \"off\". She's also taking \"Alertil\" which has L-Tryptophan in it, as well as 4 mg of Melatonin, SONIA, Glyine and some other compounds.    Additionally, now she is taking 2, 0.5 ropinirole tabs at 8 PM and also was taking 1 in the middle of the night.    Her symptoms are well-controlled at this point but I am afraid augmentation will ensue at some point again which consisted of symptoms earlier in the evening and worsening of symptoms.  She stated today that the symptoms were happening around dinnertime and she was taking the 0.5 mg of ropinirole by itself.    We discussed causes/reasons the patient have restless legs and things that can help and/or worsen RLS. I Gave the patient several handouts and tips for RLS.  Also, dopamine agonist such as ropinirole and Pramipexole.     In her case I think this may be genetic as well as made worse by her iron deficiency.  She desperately wants an iron infusion.  But her hematologist/oncologist did not feel it was necessary at the time.

## 2024-10-31 NOTE — PROGRESS NOTES
[facility]   Sleep Medicine     Office Progress Note - Initial Evaluation        [address]  [address]  [facility] [address] [phone] Fax    Reason for visit/referral: Evaluation for RLS  Assessment:      1. Restless legs  Assessment & Plan:  Long standing history. H/o anemia, iron deficiency. Ferritin was 32 7/1/24. Taking iron supplements and Ropinirole 0.5 mg.     Was recently prescribed Doxepin, which unfortunately can worsen RLS. She did not actually get the medication due to cost though.    She was on low-dose gabapentin in the past and it made her feel \"foggy\" but it has been several years I believe since she is try that medication.    Currently she is taking valerian root, which helps her sleep but makes her feel \"off\". She's also taking \"Alertil\" which has L-Tryptophan in it, as well as 4 mg of Melatonin, SONIA, Glyine and some other compounds.    Additionally, now she is taking 2, 0.5 ropinirole tabs at 8 PM and also was taking 1 in the middle of the night.    Her symptoms are well-controlled at this point but I am afraid augmentation will ensue at some point again which consisted of symptoms earlier in the evening and worsening of symptoms.  She stated today that the symptoms were happening around dinnertime and she was taking the 0.5 mg of ropinirole by itself.    We discussed causes/reasons the patient have restless legs and things that can help and/or worsen RLS. I Gave the patient several handouts and tips for RLS.  Also, dopamine agonist such as ropinirole and Pramipexole.     In her case I think this may be genetic as well as made worse by her iron deficiency.  She desperately wants an iron infusion.  But her hematologist/oncologist did not feel it was necessary at the time.    Orders:  -     traMADol (ULTRAM) 50 MG tablet; Take 0.5 tablets by mouth nightly as needed for Pain (take 1/2 tablet at night if pain is interferring with sleep) for up to

## 2024-10-31 NOTE — ASSESSMENT & PLAN NOTE
Chronic, at goal (stable), continue current treatment plan, taking Synthroid. Managed by PCP. Last TSH 1.25.

## 2024-11-01 ENCOUNTER — HOSPITAL ENCOUNTER (OUTPATIENT)
Age: 74
Setting detail: SPECIMEN
Discharge: HOME | End: 2024-11-04
Payer: MEDICARE

## 2024-11-01 ENCOUNTER — OFFICE VISIT (OUTPATIENT)
Age: 74
End: 2024-11-01

## 2024-11-01 VITALS
BODY MASS INDEX: 24.27 KG/M2 | TEMPERATURE: 97 F | RESPIRATION RATE: 18 BRPM | HEART RATE: 71 BPM | WEIGHT: 137 LBS | HEIGHT: 63 IN | DIASTOLIC BLOOD PRESSURE: 62 MMHG | OXYGEN SATURATION: 98 % | SYSTOLIC BLOOD PRESSURE: 159 MMHG

## 2024-11-01 DIAGNOSIS — N28.9 KIDNEY DISEASE: ICD-10-CM

## 2024-11-01 DIAGNOSIS — D50.9 IRON DEFICIENCY ANEMIA, UNSPECIFIED IRON DEFICIENCY ANEMIA TYPE: ICD-10-CM

## 2024-11-01 DIAGNOSIS — M79.7 FIBROMYALGIA: ICD-10-CM

## 2024-11-01 DIAGNOSIS — E07.9 THYROID DISEASE: ICD-10-CM

## 2024-11-01 DIAGNOSIS — E03.9 HYPOTHYROIDISM, UNSPECIFIED TYPE: ICD-10-CM

## 2024-11-01 DIAGNOSIS — I10 ESSENTIAL HYPERTENSION: ICD-10-CM

## 2024-11-01 DIAGNOSIS — G25.81 RESTLESS LEGS: ICD-10-CM

## 2024-11-01 DIAGNOSIS — G25.81 RESTLESS LEGS: Primary | ICD-10-CM

## 2024-11-01 LAB
ALBUMIN SERPL-MCNC: 3.2 G/DL (ref 3.4–5)
ALBUMIN/GLOB SERPL: 1 (ref 0.8–1.7)
ALP SERPL-CCNC: 60 U/L (ref 45–117)
ALT SERPL-CCNC: 24 U/L (ref 13–56)
ANION GAP SERPL CALC-SCNC: 8 MMOL/L (ref 3–18)
AST SERPL W P-5'-P-CCNC: 22 U/L (ref 10–38)
BASOPHILS # BLD: 0 K/UL (ref 0–0.1)
BASOPHILS NFR BLD: 1 % (ref 0–2)
BILIRUB SERPL-MCNC: 0.2 MG/DL (ref 0.2–1)
BUN SERPL-MCNC: 9 MG/DL (ref 7–18)
BUN/CREAT SERPL: 12 (ref 12–20)
CA-I BLD-MCNC: 9.2 MG/DL (ref 8.5–10.1)
CHLORIDE SERPL-SCNC: 101 MMOL/L (ref 100–111)
CO2 SERPL-SCNC: 27 MMOL/L (ref 21–32)
CREAT SERPL-MCNC: 0.77 MG/DL (ref 0.6–1.3)
DIFFERENTIAL METHOD BLD: ABNORMAL
EOSINOPHIL # BLD: 0 K/UL (ref 0–0.4)
EOSINOPHIL NFR BLD: 0 % (ref 0–5)
ERYTHROCYTE [DISTWIDTH] IN BLOOD BY AUTOMATED COUNT: 14.3 % (ref 11.6–14.5)
FERRITIN SERPL-MCNC: 18 NG/ML (ref 8–388)
GLOBULIN SER CALC-MCNC: 3.2 G/DL (ref 2–4)
GLUCOSE SERPL-MCNC: 100 MG/DL (ref 74–99)
HCT VFR BLD AUTO: 36.7 % (ref 35–45)
HGB BLD-MCNC: 11.9 G/DL (ref 12–16)
IMM GRANULOCYTES # BLD AUTO: 0 K/UL (ref 0–0.04)
IMM GRANULOCYTES NFR BLD AUTO: 0 % (ref 0–0.5)
IRON SATN MFR SERPL: 77 % (ref 20–50)
IRON SERPL-MCNC: 278 UG/DL (ref 50–175)
LYMPHOCYTES # BLD: 0.9 K/UL (ref 0.9–3.6)
LYMPHOCYTES NFR BLD: 10 % (ref 21–52)
MCH RBC QN AUTO: 28.7 PG (ref 24–34)
MCHC RBC AUTO-ENTMCNC: 32.4 G/DL (ref 31–37)
MCV RBC AUTO: 88.4 FL (ref 78–100)
MONOCYTES # BLD: 0.4 K/UL (ref 0.05–1.2)
MONOCYTES NFR BLD: 5 % (ref 3–10)
NEUTS SEG # BLD: 7.5 K/UL (ref 1.8–8)
NEUTS SEG NFR BLD: 84 % (ref 40–73)
NRBC # BLD: 0 K/UL (ref 0–0.01)
NRBC BLD-RTO: 0 PER 100 WBC
PLATELET # BLD AUTO: 372 K/UL (ref 135–420)
PMV BLD AUTO: 9.8 FL (ref 9.2–11.8)
POTASSIUM SERPL-SCNC: 3.8 MMOL/L (ref 3.5–5.5)
PROT SERPL-MCNC: 6.4 G/DL (ref 6.4–8.2)
RBC # BLD AUTO: 4.15 M/UL (ref 4.2–5.3)
SODIUM SERPL-SCNC: 136 MMOL/L (ref 136–145)
TIBC SERPL-MCNC: 362 UG/DL (ref 250–450)
TSH SERPL DL<=0.05 MIU/L-ACNC: 0.94 UIU/ML (ref 0.36–3.74)
WBC # BLD AUTO: 8.9 K/UL (ref 4.6–13.2)

## 2024-11-01 PROCEDURE — 36415 COLL VENOUS BLD VENIPUNCTURE: CPT

## 2024-11-01 PROCEDURE — 83540 ASSAY OF IRON: CPT

## 2024-11-01 PROCEDURE — 84443 ASSAY THYROID STIM HORMONE: CPT

## 2024-11-01 PROCEDURE — 85025 COMPLETE CBC W/AUTO DIFF WBC: CPT

## 2024-11-01 PROCEDURE — 82728 ASSAY OF FERRITIN: CPT

## 2024-11-01 PROCEDURE — 80053 COMPREHEN METABOLIC PANEL: CPT

## 2024-11-01 RX ORDER — TRAMADOL HYDROCHLORIDE 50 MG/1
25 TABLET ORAL NIGHTLY PRN
Qty: 7 TABLET | Refills: 0 | Status: SHIPPED | OUTPATIENT
Start: 2024-11-01 | End: 2024-11-15

## 2024-11-01 RX ORDER — PREGABALIN 50 MG/1
50 CAPSULE ORAL NIGHTLY
Qty: 30 CAPSULE | Refills: 0 | Status: SHIPPED | OUTPATIENT
Start: 2024-11-01 | End: 2024-12-01

## 2024-11-01 RX ORDER — GABAPENTIN ENACARBIL 300 MG/1
300 TABLET, EXTENDED RELEASE ORAL
Qty: 60 TABLET | Refills: 1 | Status: SHIPPED | OUTPATIENT
Start: 2024-11-01 | End: 2024-11-01

## 2024-11-01 ASSESSMENT — PATIENT HEALTH QUESTIONNAIRE - PHQ9
SUM OF ALL RESPONSES TO PHQ QUESTIONS 1-9: 0
1. LITTLE INTEREST OR PLEASURE IN DOING THINGS: NOT AT ALL
SUM OF ALL RESPONSES TO PHQ QUESTIONS 1-9: 0
SUM OF ALL RESPONSES TO PHQ9 QUESTIONS 1 & 2: 0
2. FEELING DOWN, DEPRESSED OR HOPELESS: NOT AT ALL

## 2024-11-01 ASSESSMENT — SLEEP AND FATIGUE QUESTIONNAIRES
HOW LIKELY ARE YOU TO NOD OFF OR FALL ASLEEP WHILE SITTING QUIETLY AFTER LUNCH WITHOUT ALCOHOL: WOULD NEVER DOZE
HOW LIKELY ARE YOU TO NOD OFF OR FALL ASLEEP IN A CAR, WHILE STOPPED FOR A FEW MINUTES IN TRAFFIC: WOULD NEVER DOZE
HOW LIKELY ARE YOU TO NOD OFF OR FALL ASLEEP WHILE SITTING AND READING: WOULD NEVER DOZE
HOW LIKELY ARE YOU TO NOD OFF OR FALL ASLEEP WHILE WATCHING TV: WOULD NEVER DOZE
HOW LIKELY ARE YOU TO NOD OFF OR FALL ASLEEP WHILE LYING DOWN TO REST IN THE AFTERNOON WHEN CIRCUMSTANCES PERMIT: SLIGHT CHANCE OF DOZING
ESS TOTAL SCORE: 1
HOW LIKELY ARE YOU TO NOD OFF OR FALL ASLEEP WHEN YOU ARE A PASSENGER IN A CAR FOR AN HOUR WITHOUT A BREAK: WOULD NEVER DOZE
HOW LIKELY ARE YOU TO NOD OFF OR FALL ASLEEP WHILE SITTING INACTIVE IN A PUBLIC PLACE: WOULD NEVER DOZE
HOW LIKELY ARE YOU TO NOD OFF OR FALL ASLEEP WHILE SITTING AND TALKING TO SOMEONE: WOULD NEVER DOZE

## 2024-11-01 NOTE — PROGRESS NOTES
Yodit Yoo presents today for   Chief Complaint   Patient presents with    Insomnia    Sleep Problem     restless leg syndrome       Is someone accompanying this pt? no    Is the patient using any DME equipment during OV? no    -DME Company: N/A    Have you ever had a sleep study done before? no    Depression Screenin/1/2024     8:15 AM   PHQ-9    Little interest or pleasure in doing things 0   Feeling down, depressed, or hopeless 0   PHQ-2 Score 0   PHQ-9 Total Score 0        [place] Sleepiness Scale:      2024     8:20 AM   Sleep Medicine   Sitting and reading 0   Watching TV 0   Sitting, inactive in a public place (e.g. a theatre or a meeting) 0   As a passenger in a car for an hour without a break 0   Lying down to rest in the afternoon when circumstances permit 1   Sitting and talking to someone 0   Sitting quietly after a lunch without alcohol 0   In a car, while stopped for a few minutes in traffic 0   [place] Sleepiness Score 1   Neck (Inches) 12       Stop-Ban/1/2024     8:00 AM   STOP-BANG QUESTIONNAIRE   Are you a loud and/or regular snorer? 0   Do you often feel tired or groggy upon awakening or do you awaken with a headache? 1   Have you been observed to gasp or stop breathing during sleep? 1   Are you often tired or fatigued during wake time hours?  0   Do you fall asleep sitting, reading, watching TV or driving? 0   Do you often have problems with memory or concentration? 0   Do you have or are you being treated for high blood pressure? 1   Recent BMI (Calculated) 24.6   Is BMI greater than 35 kg/m2? 0=No   Age older than 50 years old? 1=Yes   Is your neck circumference greater than 17 inches (Male) or 16 inches (Female)? 0   Gender - Male 0=No   STOP-Bang Total Score 28.6         Coordination of Care:  1. Have you been to the ER, urgent care clinic since your last visit? Hospitalized since your last visit? no    2. Have you seen or consulted any other health care

## 2024-11-12 DIAGNOSIS — G25.81 RESTLESS LEGS SYNDROME: Primary | ICD-10-CM

## 2024-11-12 RX ORDER — GABAPENTIN 100 MG/1
100 CAPSULE ORAL NIGHTLY
Qty: 30 CAPSULE | Refills: 2 | Status: SHIPPED | OUTPATIENT
Start: 2024-11-12 | End: 2025-02-10

## 2024-11-19 ENCOUNTER — OFFICE VISIT (OUTPATIENT)
Facility: CLINIC | Age: 74
End: 2024-11-19
Payer: MEDICARE

## 2024-11-19 VITALS
HEART RATE: 56 BPM | SYSTOLIC BLOOD PRESSURE: 169 MMHG | TEMPERATURE: 97.3 F | HEIGHT: 63 IN | OXYGEN SATURATION: 99 % | WEIGHT: 141.2 LBS | DIASTOLIC BLOOD PRESSURE: 68 MMHG | BODY MASS INDEX: 25.02 KG/M2 | RESPIRATION RATE: 12 BRPM

## 2024-11-19 DIAGNOSIS — I10 ESSENTIAL HYPERTENSION: ICD-10-CM

## 2024-11-19 DIAGNOSIS — G47.00 INSOMNIA, UNSPECIFIED TYPE: ICD-10-CM

## 2024-11-19 DIAGNOSIS — Z91.018 ALLERGY TO ALPHA-GAL: ICD-10-CM

## 2024-11-19 DIAGNOSIS — G25.81 RESTLESS LEGS: Primary | ICD-10-CM

## 2024-11-19 DIAGNOSIS — R79.0 LOW SERUM FERRITIN LEVEL: ICD-10-CM

## 2024-11-19 PROCEDURE — 1160F RVW MEDS BY RX/DR IN RCRD: CPT | Performed by: FAMILY MEDICINE

## 2024-11-19 PROCEDURE — G8427 DOCREV CUR MEDS BY ELIG CLIN: HCPCS | Performed by: FAMILY MEDICINE

## 2024-11-19 PROCEDURE — G8399 PT W/DXA RESULTS DOCUMENT: HCPCS | Performed by: FAMILY MEDICINE

## 2024-11-19 PROCEDURE — 99214 OFFICE O/P EST MOD 30 MIN: CPT | Performed by: FAMILY MEDICINE

## 2024-11-19 PROCEDURE — 1123F ACP DISCUSS/DSCN MKR DOCD: CPT | Performed by: FAMILY MEDICINE

## 2024-11-19 PROCEDURE — 1090F PRES/ABSN URINE INCON ASSESS: CPT | Performed by: FAMILY MEDICINE

## 2024-11-19 PROCEDURE — 3017F COLORECTAL CA SCREEN DOC REV: CPT | Performed by: FAMILY MEDICINE

## 2024-11-19 PROCEDURE — 3078F DIAST BP <80 MM HG: CPT | Performed by: FAMILY MEDICINE

## 2024-11-19 PROCEDURE — 1126F AMNT PAIN NOTED NONE PRSNT: CPT | Performed by: FAMILY MEDICINE

## 2024-11-19 PROCEDURE — 3077F SYST BP >= 140 MM HG: CPT | Performed by: FAMILY MEDICINE

## 2024-11-19 PROCEDURE — 1159F MED LIST DOCD IN RCRD: CPT | Performed by: FAMILY MEDICINE

## 2024-11-19 PROCEDURE — G8419 CALC BMI OUT NRM PARAM NOF/U: HCPCS | Performed by: FAMILY MEDICINE

## 2024-11-19 PROCEDURE — 1036F TOBACCO NON-USER: CPT | Performed by: FAMILY MEDICINE

## 2024-11-19 PROCEDURE — G8484 FLU IMMUNIZE NO ADMIN: HCPCS | Performed by: FAMILY MEDICINE

## 2024-11-19 ASSESSMENT — PATIENT HEALTH QUESTIONNAIRE - PHQ9
2. FEELING DOWN, DEPRESSED OR HOPELESS: NOT AT ALL
SUM OF ALL RESPONSES TO PHQ QUESTIONS 1-9: 0
1. LITTLE INTEREST OR PLEASURE IN DOING THINGS: NOT AT ALL
SUM OF ALL RESPONSES TO PHQ9 QUESTIONS 1 & 2: 0
SUM OF ALL RESPONSES TO PHQ QUESTIONS 1-9: 0

## 2024-11-19 NOTE — PROGRESS NOTES
Chief Complaint   Patient presents with    Follow-up     Chronic disease       \"Have you been to the ER, urgent care clinic since your last visit?  Hospitalized since your last visit?\"    NO    “Have you seen or consulted any other health care providers outside our system since your last visit?”    NO    Have you had a mammogram?”   NO    PATIENT REFUSES MAMMOGRAM

## 2024-11-19 NOTE — PATIENT INSTRUCTIONS
Mrs. Yoo,   As we have discussed in previous visit, I feel strongly that you would benefit from iron infusion therapy given your severe restless leg syndrome, therefore goal for ferritin level is >75.  Iron infusion therapy may also be preferred considering your chronic abdominal symptoms which may limit GI absorption and cause more constipation/pain than you already have.    Our Sleep Medicine physician also agrees;   \"I think she should get the labs drawn that her primary care doctor has ordered to see where her ferritin is at this point in time.  If her ferritin is still below 75, she may have more of a case for an iron infusion due to her chronic celiac disease and possible issues with absorption at that point\".    11/1/24 ferritin: 18    Please talk with your Hematology team about this and I am happy to discuss you case with them if I could provide any specific information that could help with your care plan.    ~Dr. Yodit Camacho

## 2024-11-19 NOTE — PROGRESS NOTES
Yodit Yoo (: 1950) is a 74 y.o. female here for evaluation of the following chief concern(s):  Chronic condition management     ASSESSMENT/PLAN:  1. Restless legs  2. Low serum ferritin level  3. Insomnia, unspecified type  4. Allergy to alpha-gal  5. Essential hypertension    Mrs. Yoo appears medically stable.    Mild elevated blood pressure- presumed related to recent high sodium diet which pt has corrected, normally normotensive; close follow-up for BP re-check.      We discussed hopefully side effects of gabapentin will improve, continue close monitoring, fall ppx.  Greatly appreciate shared care w/ Sleep Medicine, and Hematology.    Personalized AVS;  \"Mrs. Yoo,   As we have discussed in previous visit, I feel strongly that you would benefit from iron infusion therapy given your severe restless leg syndrome, therefore goal for ferritin level is >75.  Iron infusion therapy may also be preferred considering your chronic abdominal symptoms which may limit GI absorption and cause more constipation/pain than you already have.    Our Sleep Medicine physician also agrees;   \"I think she should get the labs drawn that her primary care doctor has ordered to see where her ferritin is at this point in time.  If her ferritin is still below 75, she may have more of a case for an iron infusion due to her chronic celiac disease and possible issues with absorption at that point\".    24 ferritin: 18    Please talk with your Hematology team about this and I am happy to discuss you case with them if I could provide any specific information that could help with your care plan.    ~Dr. Yodit Camacho\".    Return in about 5 weeks (around 2024) for uncontrolled hypertension.    Yodit Yoo agrees with plan as above and has no additional questions at this time.       SUBJECTIVE/OBJECTIVE:    She has an \"excellent\" visit w/ Sleep Medicine.  Insurance barrier to alpha delta ligand medications other

## 2024-12-02 ENCOUNTER — TELEPHONE (OUTPATIENT)
Age: 74
End: 2024-12-02

## 2024-12-02 DIAGNOSIS — I10 ESSENTIAL HYPERTENSION: ICD-10-CM

## 2024-12-02 RX ORDER — AMLODIPINE BESYLATE 5 MG/1
5 TABLET ORAL DAILY
Qty: 90 TABLET | Refills: 1 | Status: SHIPPED | OUTPATIENT
Start: 2024-12-02

## 2024-12-02 NOTE — TELEPHONE ENCOUNTER
Pt stated she has stopped taking Gabapentin due to making her feel bad. She wanted to know if there is something else she could take.

## 2024-12-16 DIAGNOSIS — G25.81 RESTLESS LEGS SYNDROME: Primary | ICD-10-CM

## 2024-12-16 RX ORDER — PREGABALIN 50 MG/1
50 CAPSULE ORAL EVERY EVENING
Qty: 30 CAPSULE | Refills: 0 | Status: SHIPPED | OUTPATIENT
Start: 2024-12-16 | End: 2025-01-15

## 2024-12-19 DIAGNOSIS — G25.81 RESTLESS LEGS: ICD-10-CM

## 2024-12-19 RX ORDER — ROPINIROLE 0.5 MG/1
TABLET, FILM COATED ORAL
Qty: 270 TABLET | Refills: 0 | Status: SHIPPED | OUTPATIENT
Start: 2024-12-19

## 2024-12-27 DIAGNOSIS — E03.9 HYPOTHYROIDISM, UNSPECIFIED TYPE: ICD-10-CM

## 2024-12-27 RX ORDER — LEVOTHYROXINE SODIUM 50 UG/1
50 TABLET ORAL DAILY
Qty: 90 TABLET | Refills: 0 | Status: SHIPPED | OUTPATIENT
Start: 2024-12-27

## 2024-12-31 ENCOUNTER — OFFICE VISIT (OUTPATIENT)
Facility: CLINIC | Age: 74
End: 2024-12-31
Payer: MEDICARE

## 2024-12-31 VITALS
SYSTOLIC BLOOD PRESSURE: 146 MMHG | TEMPERATURE: 98.3 F | WEIGHT: 137 LBS | OXYGEN SATURATION: 99 % | BODY MASS INDEX: 24.27 KG/M2 | HEIGHT: 63 IN | RESPIRATION RATE: 18 BRPM | HEART RATE: 57 BPM | DIASTOLIC BLOOD PRESSURE: 70 MMHG

## 2024-12-31 DIAGNOSIS — Z91.018 ALLERGY TO ALPHA-GAL: ICD-10-CM

## 2024-12-31 DIAGNOSIS — G25.81 RESTLESS LEGS: ICD-10-CM

## 2024-12-31 DIAGNOSIS — I10 ESSENTIAL HYPERTENSION: Primary | ICD-10-CM

## 2024-12-31 DIAGNOSIS — R79.0 LOW SERUM FERRITIN LEVEL: ICD-10-CM

## 2024-12-31 DIAGNOSIS — G47.00 INSOMNIA, UNSPECIFIED TYPE: ICD-10-CM

## 2024-12-31 PROCEDURE — 3078F DIAST BP <80 MM HG: CPT | Performed by: FAMILY MEDICINE

## 2024-12-31 PROCEDURE — 1036F TOBACCO NON-USER: CPT | Performed by: FAMILY MEDICINE

## 2024-12-31 PROCEDURE — G8427 DOCREV CUR MEDS BY ELIG CLIN: HCPCS | Performed by: FAMILY MEDICINE

## 2024-12-31 PROCEDURE — 1159F MED LIST DOCD IN RCRD: CPT | Performed by: FAMILY MEDICINE

## 2024-12-31 PROCEDURE — G8484 FLU IMMUNIZE NO ADMIN: HCPCS | Performed by: FAMILY MEDICINE

## 2024-12-31 PROCEDURE — 1123F ACP DISCUSS/DSCN MKR DOCD: CPT | Performed by: FAMILY MEDICINE

## 2024-12-31 PROCEDURE — 1160F RVW MEDS BY RX/DR IN RCRD: CPT | Performed by: FAMILY MEDICINE

## 2024-12-31 PROCEDURE — 99214 OFFICE O/P EST MOD 30 MIN: CPT | Performed by: FAMILY MEDICINE

## 2024-12-31 PROCEDURE — 1090F PRES/ABSN URINE INCON ASSESS: CPT | Performed by: FAMILY MEDICINE

## 2024-12-31 PROCEDURE — 3077F SYST BP >= 140 MM HG: CPT | Performed by: FAMILY MEDICINE

## 2024-12-31 PROCEDURE — 3017F COLORECTAL CA SCREEN DOC REV: CPT | Performed by: FAMILY MEDICINE

## 2024-12-31 PROCEDURE — G8399 PT W/DXA RESULTS DOCUMENT: HCPCS | Performed by: FAMILY MEDICINE

## 2024-12-31 PROCEDURE — G8420 CALC BMI NORM PARAMETERS: HCPCS | Performed by: FAMILY MEDICINE

## 2024-12-31 PROCEDURE — 1126F AMNT PAIN NOTED NONE PRSNT: CPT | Performed by: FAMILY MEDICINE

## 2024-12-31 RX ORDER — AMLODIPINE BESYLATE 2.5 MG/1
2.5 TABLET ORAL DAILY
Qty: 30 TABLET | Refills: 0 | Status: SHIPPED | OUTPATIENT
Start: 2024-12-31

## 2024-12-31 ASSESSMENT — PATIENT HEALTH QUESTIONNAIRE - PHQ9
SUM OF ALL RESPONSES TO PHQ QUESTIONS 1-9: 0
2. FEELING DOWN, DEPRESSED OR HOPELESS: NOT AT ALL
SUM OF ALL RESPONSES TO PHQ QUESTIONS 1-9: 0
SUM OF ALL RESPONSES TO PHQ QUESTIONS 1-9: 0
SUM OF ALL RESPONSES TO PHQ9 QUESTIONS 1 & 2: 0
SUM OF ALL RESPONSES TO PHQ QUESTIONS 1-9: 0
1. LITTLE INTEREST OR PLEASURE IN DOING THINGS: NOT AT ALL

## 2024-12-31 NOTE — PROGRESS NOTES
Chief Complaint   Patient presents with    Follow-up     Chronic disease   BP's at home have been 130/65, 115/61, 139/71, 130/62, 132/62, 120/66, 137/62, 141/64    \"Have you been to the ER, urgent care clinic since your last visit?  Hospitalized since your last visit?\"    NO    “Have you seen or consulted any other health care providers outside our system since your last visit?”    NO    Have you had a mammogram?”   NO  Patient refuses Mammogram  Date of last Mammogram: 2/7/2020

## 2024-12-31 NOTE — PROGRESS NOTES
Yodit Yoo (: 1950) is a 74 y.o. female here for evaluation of the following chief concern(s):  Chronic condition management     ASSESSMENT/PLAN:  1. Essential hypertension  -     amLODIPine (NORVASC) 2.5 MG tablet; Take 1 tablet by mouth daily .  Take in addition to Amlodipine 5mg for a total daily dose of 7.5mg daily., Disp-30 tablet, R-0Normal  2. Allergy to alpha-gal  3. Restless legs  4. Low serum ferritin level  5. Insomnia, unspecified type    Mrs. Yoo appears medically stable.    Mild elevated blood pressure in office and based on ambulatory log; small dose increase to Amlodipine from 5mg to 7.5mg QD.    Greatly appreciate shared care w/ Hematology and Sleep Medicine.    Return in about 4 weeks (around 2025) for uncontrolled hypertension.    Yodit Yoo agrees with plan as above and has no additional questions at this time.       SUBJECTIVE/OBJECTIVE:    Pt has started iron infusion and she seems to be feeling better; more energy and restless leg symptoms have improved.  She has weaned Ropinirole to one tab QHS.  She has also tried to cut dairy from her diet which seems to also be helping- some regression w/ re-introducing dairy caused regression of her symptoms.  She may consider trying Lyrica again after the New Year (but hoping off because doing good right now).      Interval hx;  She has an \"excellent\" visit w/ Sleep Medicine.  Insurance barrier to alpha delta ligand medications other than Gabapentin.  PRN Tramadol for bridge therapy.  She tried stopping the Ropinirole acutely while stating Gabapentin and this did not do good!  So, last night she took both (staggered) and that seemed to do OK.   \"Swimmy\" headed feeling since starting gabapentin.  Hx;  Symptoms recently have been variable, generally doing well if she avoids meat and other diet allergens.     She is following w/ Allergy/Immunology and Rheumatology.     Interval/pertinent hx;  She is planned 2024 for small

## 2025-01-13 ENCOUNTER — TELEPHONE (OUTPATIENT)
Facility: CLINIC | Age: 75
End: 2025-01-13

## 2025-01-13 NOTE — TELEPHONE ENCOUNTER
Pt called and is aware to check scheduling message to schedule self - inform pt of mychart schedule sent

## 2025-01-13 NOTE — TELEPHONE ENCOUNTER
Pt states she has the Flu ( positive ) no fever  - taking mucusnix  is not helping - wondering if provider would prescribed  cough medicine or something to help with symptoms. Please advise - pt states she was seen few weeks ago.

## 2025-01-24 ENCOUNTER — CLINICAL DOCUMENTATION (OUTPATIENT)
Age: 75
End: 2025-01-24

## 2025-01-24 DIAGNOSIS — G25.81 RESTLESS LEGS: Primary | ICD-10-CM

## 2025-01-24 RX ORDER — PREGABALIN 50 MG/1
50 CAPSULE ORAL DAILY
Qty: 90 CAPSULE | Refills: 0 | Status: SHIPPED | OUTPATIENT
Start: 2025-01-24 | End: 2025-04-24

## 2025-01-24 NOTE — PROGRESS NOTES
[facility] has put in a refill request for the medication Pregabalin 50 mg Capsule.   Last fill Date: 12-.    Pharmacy information:   Address: [address] [place] [id number]  Phone: [phone]  Fax: [phone]

## 2025-01-29 ENCOUNTER — OFFICE VISIT (OUTPATIENT)
Facility: CLINIC | Age: 75
End: 2025-01-29
Payer: MEDICARE

## 2025-01-29 VITALS
DIASTOLIC BLOOD PRESSURE: 67 MMHG | HEIGHT: 63 IN | WEIGHT: 137.2 LBS | RESPIRATION RATE: 18 BRPM | OXYGEN SATURATION: 96 % | SYSTOLIC BLOOD PRESSURE: 140 MMHG | BODY MASS INDEX: 24.31 KG/M2 | TEMPERATURE: 98.1 F | HEART RATE: 68 BPM

## 2025-01-29 DIAGNOSIS — R79.0 LOW SERUM FERRITIN LEVEL: ICD-10-CM

## 2025-01-29 DIAGNOSIS — G25.81 RESTLESS LEGS: ICD-10-CM

## 2025-01-29 DIAGNOSIS — I10 ESSENTIAL HYPERTENSION: Primary | ICD-10-CM

## 2025-01-29 PROCEDURE — 1123F ACP DISCUSS/DSCN MKR DOCD: CPT | Performed by: FAMILY MEDICINE

## 2025-01-29 PROCEDURE — G8420 CALC BMI NORM PARAMETERS: HCPCS | Performed by: FAMILY MEDICINE

## 2025-01-29 PROCEDURE — 1126F AMNT PAIN NOTED NONE PRSNT: CPT | Performed by: FAMILY MEDICINE

## 2025-01-29 PROCEDURE — G8427 DOCREV CUR MEDS BY ELIG CLIN: HCPCS | Performed by: FAMILY MEDICINE

## 2025-01-29 PROCEDURE — 3077F SYST BP >= 140 MM HG: CPT | Performed by: FAMILY MEDICINE

## 2025-01-29 PROCEDURE — 1160F RVW MEDS BY RX/DR IN RCRD: CPT | Performed by: FAMILY MEDICINE

## 2025-01-29 PROCEDURE — 3017F COLORECTAL CA SCREEN DOC REV: CPT | Performed by: FAMILY MEDICINE

## 2025-01-29 PROCEDURE — 99214 OFFICE O/P EST MOD 30 MIN: CPT | Performed by: FAMILY MEDICINE

## 2025-01-29 PROCEDURE — 3078F DIAST BP <80 MM HG: CPT | Performed by: FAMILY MEDICINE

## 2025-01-29 PROCEDURE — G8399 PT W/DXA RESULTS DOCUMENT: HCPCS | Performed by: FAMILY MEDICINE

## 2025-01-29 PROCEDURE — 1159F MED LIST DOCD IN RCRD: CPT | Performed by: FAMILY MEDICINE

## 2025-01-29 PROCEDURE — 1036F TOBACCO NON-USER: CPT | Performed by: FAMILY MEDICINE

## 2025-01-29 PROCEDURE — 1090F PRES/ABSN URINE INCON ASSESS: CPT | Performed by: FAMILY MEDICINE

## 2025-01-29 RX ORDER — ASPIRIN 81 MG/1
81 TABLET ORAL EVERY OTHER DAY
COMMUNITY

## 2025-01-29 SDOH — ECONOMIC STABILITY: FOOD INSECURITY: WITHIN THE PAST 12 MONTHS, THE FOOD YOU BOUGHT JUST DIDN'T LAST AND YOU DIDN'T HAVE MONEY TO GET MORE.: NEVER TRUE

## 2025-01-29 SDOH — ECONOMIC STABILITY: FOOD INSECURITY: WITHIN THE PAST 12 MONTHS, YOU WORRIED THAT YOUR FOOD WOULD RUN OUT BEFORE YOU GOT MONEY TO BUY MORE.: NEVER TRUE

## 2025-01-29 ASSESSMENT — PATIENT HEALTH QUESTIONNAIRE - PHQ9
1. LITTLE INTEREST OR PLEASURE IN DOING THINGS: NOT AT ALL
SUM OF ALL RESPONSES TO PHQ QUESTIONS 1-9: 0
SUM OF ALL RESPONSES TO PHQ9 QUESTIONS 1 & 2: 0
SUM OF ALL RESPONSES TO PHQ QUESTIONS 1-9: 0
SUM OF ALL RESPONSES TO PHQ QUESTIONS 1-9: 0
2. FEELING DOWN, DEPRESSED OR HOPELESS: NOT AT ALL
SUM OF ALL RESPONSES TO PHQ QUESTIONS 1-9: 0

## 2025-01-29 NOTE — PROGRESS NOTES
Yodit Yoo (: 1950) is a 74 y.o. female here for evaluation of the following chief concern(s):  Chronic condition management     ASSESSMENT/PLAN:  1. Essential hypertension  2. Low serum ferritin level  3. Restless legs    Mrs. Yoo appears medically stable.    Borderline elevated pressure in the office, though pt still healing from recent influenza.  Continue current antihypertensive regimen.     Greatly appreciate shared care w/ Hematology and Sleep Medicine.    Return in about 3 months (around 2025) for follow-up chronic conditions or sooner if needed.    Yodit Yoo agrees with plan as above and has no additional questions at this time.       SUBJECTIVE/OBJECTIVE:    HTN:  Meds: Amlodipine 2.5mg QAM, lisinopril-HCTZ 20-12.5mg QAM, Amlodipine 5mg QHS.   Ambulatory reads sBP range ~113-133  Hx increased total dose of Amlodipine from 5mg to 7.5mg 25.   Hx home cuff checked against machine in office found to be accurate (2025).    Pt had eval w/ Vascular, SAVANNA is stable; recs for aspirin which she has been taking every other day w/ food.    Pt has been started on Lyrica which is doing well, mild side effect of unsteadiness/brain fog.  She is taking HALF of a Ropinirole to help w/ breakthrough LE pain in the morning.    She will follow-up w/ Hematology in 2025 to see if she will continue iron infusions.        Interval hx;  Pt has started iron infusion and she seems to be feeling better; more energy and restless leg symptoms have improved.  She has weaned Ropinirole to one tab QHS.  She has also tried to cut dairy from her diet which seems to also be helping- some regression w/ re-introducing dairy caused regression of her symptoms.  She may consider trying Lyrica again after the New Year (but hoping off because doing good right now).      Interval hx;  She has an \"excellent\" visit w/ Sleep Medicine.  Insurance barrier to alpha delta ligand medications other than Gabapentin.  PRN

## 2025-01-29 NOTE — PROGRESS NOTES
Patient tested positive for flu on 1/13/25 at Urgent Care.  Home BP monitor reading 140/67 in office today  Chief Complaint   Patient presents with    Follow-up     Chronic disease       \"Have you been to the ER, urgent care clinic since your last visit?  Hospitalized since your last visit?\"    Urgent Care/ Flu positive 1/13/2025    “Have you seen or consulted any other health care providers outside our system since your last visit?”    NO    Have you had a mammogram?”   NO  Patient refuses mammogram  Date of last Mammogram: 2/7/2020

## 2025-02-14 DIAGNOSIS — I10 ESSENTIAL HYPERTENSION: ICD-10-CM

## 2025-02-17 RX ORDER — AMLODIPINE BESYLATE 2.5 MG/1
2.5 TABLET ORAL DAILY
Qty: 90 TABLET | Refills: 0 | Status: SHIPPED | OUTPATIENT
Start: 2025-02-17

## 2025-03-01 ASSESSMENT — SLEEP AND FATIGUE QUESTIONNAIRES
HOW LIKELY ARE YOU TO NOD OFF OR FALL ASLEEP WHILE LYING DOWN TO REST IN THE AFTERNOON WHEN CIRCUMSTANCES PERMIT: WOULD NEVER DOZE
HOW LIKELY ARE YOU TO NOD OFF OR FALL ASLEEP WHEN YOU ARE A PASSENGER IN A CAR FOR AN HOUR WITHOUT A BREAK: WOULD NEVER DOZE
HOW LIKELY ARE YOU TO NOD OFF OR FALL ASLEEP WHILE SITTING AND TALKING TO SOMEONE: WOULD NEVER DOZE
HOW LIKELY ARE YOU TO NOD OFF OR FALL ASLEEP WHILE WATCHING TV: WOULD NEVER DOZE
HOW LIKELY ARE YOU TO NOD OFF OR FALL ASLEEP WHILE SITTING AND READING: WOULD NEVER DOZE
HOW LIKELY ARE YOU TO NOD OFF OR FALL ASLEEP WHILE SITTING INACTIVE IN A PUBLIC PLACE: WOULD NEVER DOZE
HOW LIKELY ARE YOU TO NOD OFF OR FALL ASLEEP WHILE SITTING AND READING: WOULD NEVER DOZE
HOW LIKELY ARE YOU TO NOD OFF OR FALL ASLEEP WHILE SITTING INACTIVE IN A PUBLIC PLACE: WOULD NEVER DOZE
HOW LIKELY ARE YOU TO NOD OFF OR FALL ASLEEP WHILE LYING DOWN TO REST IN THE AFTERNOON WHEN CIRCUMSTANCES PERMIT: WOULD NEVER DOZE
HOW LIKELY ARE YOU TO NOD OFF OR FALL ASLEEP WHILE SITTING QUIETLY AFTER LUNCH WITHOUT ALCOHOL: WOULD NEVER DOZE
HOW LIKELY ARE YOU TO NOD OFF OR FALL ASLEEP WHILE WATCHING TV: WOULD NEVER DOZE
HOW LIKELY ARE YOU TO NOD OFF OR FALL ASLEEP WHEN YOU ARE A PASSENGER IN A CAR FOR AN HOUR WITHOUT A BREAK: WOULD NEVER DOZE
HOW LIKELY ARE YOU TO NOD OFF OR FALL ASLEEP IN A CAR, WHILE STOPPED FOR A FEW MINUTES IN TRAFFIC: WOULD NEVER DOZE
HOW LIKELY ARE YOU TO NOD OFF OR FALL ASLEEP WHILE SITTING AND TALKING TO SOMEONE: WOULD NEVER DOZE
HOW LIKELY ARE YOU TO NOD OFF OR FALL ASLEEP WHILE SITTING QUIETLY AFTER LUNCH WITHOUT ALCOHOL: WOULD NEVER DOZE
HOW LIKELY ARE YOU TO NOD OFF OR FALL ASLEEP IN A CAR, WHILE STOPPED FOR A FEW MINUTES IN TRAFFIC: WOULD NEVER DOZE
ESS TOTAL SCORE: 0

## 2025-03-03 ENCOUNTER — OFFICE VISIT (OUTPATIENT)
Age: 75
End: 2025-03-03
Payer: MEDICARE

## 2025-03-03 VITALS
TEMPERATURE: 97 F | DIASTOLIC BLOOD PRESSURE: 57 MMHG | BODY MASS INDEX: 24.1 KG/M2 | SYSTOLIC BLOOD PRESSURE: 147 MMHG | WEIGHT: 136 LBS | HEART RATE: 60 BPM | HEIGHT: 63 IN | RESPIRATION RATE: 18 BRPM | OXYGEN SATURATION: 100 %

## 2025-03-03 DIAGNOSIS — G25.81 RESTLESS LEGS: Primary | ICD-10-CM

## 2025-03-03 DIAGNOSIS — G47.33 OBSTRUCTIVE SLEEP APNEA (ADULT) (PEDIATRIC): ICD-10-CM

## 2025-03-03 DIAGNOSIS — R06.83 SNORING: ICD-10-CM

## 2025-03-03 DIAGNOSIS — G47.00 INSOMNIA, UNSPECIFIED TYPE: ICD-10-CM

## 2025-03-03 DIAGNOSIS — I10 ESSENTIAL HYPERTENSION: ICD-10-CM

## 2025-03-03 DIAGNOSIS — E07.9 THYROID DISEASE: ICD-10-CM

## 2025-03-03 DIAGNOSIS — K90.0 CELIAC DISEASE: ICD-10-CM

## 2025-03-03 PROBLEM — M79.7 FIBROMYALGIA: Status: RESOLVED | Noted: 2018-03-28 | Resolved: 2025-03-03

## 2025-03-03 PROBLEM — R76.8 POSITIVE ANTINUCLEAR ANTIBODY: Status: ACTIVE | Noted: 2025-03-03

## 2025-03-03 PROBLEM — D64.9 ANEMIA: Status: ACTIVE | Noted: 2025-03-03

## 2025-03-03 PROBLEM — D50.8 ACQUIRED IRON DEFICIENCY ANEMIA DUE TO DECREASED ABSORPTION: Status: ACTIVE | Noted: 2025-03-03

## 2025-03-03 PROBLEM — N28.9 KIDNEY DISEASE: Status: RESOLVED | Noted: 2023-06-05 | Resolved: 2025-03-03

## 2025-03-03 PROCEDURE — 1160F RVW MEDS BY RX/DR IN RCRD: CPT | Performed by: OTOLARYNGOLOGY

## 2025-03-03 PROCEDURE — G8427 DOCREV CUR MEDS BY ELIG CLIN: HCPCS | Performed by: OTOLARYNGOLOGY

## 2025-03-03 PROCEDURE — 3017F COLORECTAL CA SCREEN DOC REV: CPT | Performed by: OTOLARYNGOLOGY

## 2025-03-03 PROCEDURE — 99215 OFFICE O/P EST HI 40 MIN: CPT | Performed by: OTOLARYNGOLOGY

## 2025-03-03 PROCEDURE — 1036F TOBACCO NON-USER: CPT | Performed by: OTOLARYNGOLOGY

## 2025-03-03 PROCEDURE — 1159F MED LIST DOCD IN RCRD: CPT | Performed by: OTOLARYNGOLOGY

## 2025-03-03 PROCEDURE — 3078F DIAST BP <80 MM HG: CPT | Performed by: OTOLARYNGOLOGY

## 2025-03-03 PROCEDURE — G8399 PT W/DXA RESULTS DOCUMENT: HCPCS | Performed by: OTOLARYNGOLOGY

## 2025-03-03 PROCEDURE — 3077F SYST BP >= 140 MM HG: CPT | Performed by: OTOLARYNGOLOGY

## 2025-03-03 PROCEDURE — G8420 CALC BMI NORM PARAMETERS: HCPCS | Performed by: OTOLARYNGOLOGY

## 2025-03-03 PROCEDURE — 1090F PRES/ABSN URINE INCON ASSESS: CPT | Performed by: OTOLARYNGOLOGY

## 2025-03-03 PROCEDURE — 1123F ACP DISCUSS/DSCN MKR DOCD: CPT | Performed by: OTOLARYNGOLOGY

## 2025-03-03 RX ORDER — LEMBOREXANT 5 MG/1
1 TABLET, FILM COATED ORAL NIGHTLY
Qty: 30 TABLET | Refills: 1 | Status: SHIPPED | OUTPATIENT
Start: 2025-03-03

## 2025-03-03 RX ORDER — TRAMADOL HYDROCHLORIDE 50 MG/1
25 TABLET ORAL NIGHTLY PRN
Qty: 14 TABLET | Refills: 0 | Status: CANCELLED | OUTPATIENT
Start: 2025-03-03 | End: 2025-04-02

## 2025-03-03 ASSESSMENT — PATIENT HEALTH QUESTIONNAIRE - PHQ9
1. LITTLE INTEREST OR PLEASURE IN DOING THINGS: NOT AT ALL
SUM OF ALL RESPONSES TO PHQ QUESTIONS 1-9: 0

## 2025-03-03 NOTE — PATIENT INSTRUCTIONS
device     Safety is strongly encouraged.  You should not drive if sleepy, tired, distracted and/or fatigued.      Chest Xrays and blood work do not require appointments.  They are considered \"Walk-In\" services and can be obtained at either [facility] or [facility].  Blood work is performed at the Laboratory (Lab) from 08:00am - 04:00 pm (if applicable to your visit)

## 2025-03-03 NOTE — ASSESSMENT & PLAN NOTE
Chronic, at goal (stable), continue current treatment plan, feeling dramatically better after getting her iron infusion December and restless legs are under pretty good control.  It does not bother her nearly as much and she can \"manage his symptoms pretty well now\".  She continues to take iron once a day as well.  Is taking Lyrica 50 mg and one half of ropinirole at night.  Now the problem is difficulty falling asleep and staying asleep.    She is taking several over-the-counter supplements to include magnesium glycinate, valerian root, Ashgawanda and L-theanine.  While these medications help her fall asleep, and rather have her on an actual medication instead of a handful of over-the-counter supplements.  We discussed a trial of Quviviq/Daridorexant.  Also, she does snore and I think getting a home sleep apnea test to ensure she does not have sleep disordered breathing would be reasonable as well.

## 2025-03-03 NOTE — PROGRESS NOTES
any pap smears or colon screening. no    Medication list has been updated according to patient.  
legs.    Once we are able to start her on an alpha delta ligand, I will see her back in the office 2 to 3 weeks later.    I did give her several symptom diaries to fill out as well as we can track her progress.    This information was analyzed to assess complexity and medical decision making in regards to further testing and management.    Addendum: I tried prescribing the horizontal and it was immediately rejected was not sent to the pharmacy as an uncovered medication and the expense would be too high for most people even with pharmacy assistance.    I did put in the Lyrica, 50 mg to be taken 2 to 3 hours before bedtime to start.  I will fill out the prior authorization for this and see if we can get her started on low-dose Lyrica for a few weeks and then see her back in the office.  If she starts taking the Lyrica she needs avoid taking the tramadol,  and I will ensure she gets this information.    Orders Placed This Encounter   Procedures    PAT - Home Sleep Test     Standing Status:   Future     Standing Expiration Date:   3/3/2026     Scheduling Instructions:      74-year-old female with a history of snoring, difficulty falling asleep and staying asleep as well as hypertension.  Ordering a home sleep apnea test to evaluate for possible obstructive sleep apnea.     Order Specific Question:   Location For Sleep Study     Answer:   [facility]        Potential consequences of untreated sleep apnea, and/or excessive daytime sleepiness were discussed with the patient.  Educational materials provided.  Treatment options including CPAP, dental appliance, weight reduction measures, positional therapy, surgeries etc were at least briefly discussed.  Healthy lifestyle changes to include weight loss and exercise discussed.  Healthy sleep habits were reviewed and encouraged.   and workplace safety reviewed and discussed as appropriate. Drowsy and/or inattentive driving should be avoided.  Follow up with

## 2025-03-25 DIAGNOSIS — E61.1 IRON DEFICIENCY: ICD-10-CM

## 2025-03-25 DIAGNOSIS — E03.9 HYPOTHYROIDISM, UNSPECIFIED TYPE: ICD-10-CM

## 2025-03-26 RX ORDER — FERROUS SULFATE 325(65) MG
1 TABLET ORAL 2 TIMES DAILY
Qty: 180 TABLET | Refills: 3 | OUTPATIENT
Start: 2025-03-26

## 2025-03-26 RX ORDER — LEVOTHYROXINE SODIUM 50 UG/1
50 TABLET ORAL DAILY
Qty: 90 TABLET | Refills: 0 | Status: SHIPPED | OUTPATIENT
Start: 2025-03-26

## 2025-03-26 NOTE — TELEPHONE ENCOUNTER
Levothyroxine send. Denied Iron due to last level in labs and last refill I see a year ago. Need to clarify with PCP if Iron daily still needed prior to sending any refill for the Iron. Looks like patient may be getting Iron infusions as well.

## 2025-03-27 ENCOUNTER — TELEPHONE (OUTPATIENT)
Age: 75
End: 2025-03-27

## 2025-03-27 NOTE — TELEPHONE ENCOUNTER
Pt stated that she tried the coupon from the [employer] & the medication Dayvigo would still be over $300. She was told the reason why is she is on Medicare. Pt wanted to know if the is a cheaper alternative.

## 2025-03-29 DIAGNOSIS — E61.1 IRON DEFICIENCY: ICD-10-CM

## 2025-03-31 RX ORDER — FERROUS SULFATE 325(65) MG
325 TABLET ORAL
Qty: 90 TABLET | Refills: 0 | Status: SHIPPED | OUTPATIENT
Start: 2025-03-31

## 2025-04-02 DIAGNOSIS — G47.09 OTHER INSOMNIA: Primary | ICD-10-CM

## 2025-04-02 RX ORDER — ESZOPICLONE 2 MG/1
2 TABLET, FILM COATED ORAL NIGHTLY
Qty: 60 TABLET | Refills: 0 | Status: SHIPPED | OUTPATIENT
Start: 2025-04-02 | End: 2025-06-01

## 2025-04-17 ENCOUNTER — HOSPITAL ENCOUNTER (EMERGENCY)
Age: 75
Discharge: ANOTHER HOSPITAL | End: 2025-04-17
Attending: FAMILY MEDICINE
Payer: MEDICARE

## 2025-04-17 ENCOUNTER — APPOINTMENT (OUTPATIENT)
Age: 75
End: 2025-04-17
Payer: MEDICARE

## 2025-04-17 ENCOUNTER — HOSPITAL ENCOUNTER (INPATIENT)
Facility: HOSPITAL | Age: 75
LOS: 5 days | Discharge: HOME | DRG: 330 | End: 2025-04-22
Attending: INTERNAL MEDICINE | Admitting: INTERNAL MEDICINE
Payer: MEDICARE

## 2025-04-17 VITALS
HEART RATE: 76 BPM | OXYGEN SATURATION: 97 % | BODY MASS INDEX: 23.92 KG/M2 | HEIGHT: 63 IN | DIASTOLIC BLOOD PRESSURE: 65 MMHG | SYSTOLIC BLOOD PRESSURE: 148 MMHG | RESPIRATION RATE: 16 BRPM | WEIGHT: 135 LBS | TEMPERATURE: 98.8 F

## 2025-04-17 DIAGNOSIS — R11.0 NAUSEA: ICD-10-CM

## 2025-04-17 DIAGNOSIS — K56.609 SMALL BOWEL OBSTRUCTION (HCC): Primary | ICD-10-CM

## 2025-04-17 DIAGNOSIS — E86.0 DEHYDRATION: ICD-10-CM

## 2025-04-17 DIAGNOSIS — K56.609 SBO (SMALL BOWEL OBSTRUCTION) (HCC): Primary | ICD-10-CM

## 2025-04-17 LAB
ALBUMIN SERPL-MCNC: 3.2 G/DL (ref 3.4–5)
ALBUMIN/GLOB SERPL: 0.9 (ref 0.8–1.7)
ALP SERPL-CCNC: 60 U/L (ref 45–117)
ALT SERPL-CCNC: 18 U/L (ref 13–56)
ANION GAP SERPL CALC-SCNC: 6 MMOL/L (ref 3–18)
APPEARANCE UR: CLEAR
AST SERPL W P-5'-P-CCNC: 17 U/L (ref 10–38)
BACTERIA URNS QL MICRO: ABNORMAL /HPF
BASOPHILS # BLD: 0.02 K/UL (ref 0–0.1)
BASOPHILS NFR BLD: 0.2 % (ref 0–2)
BILIRUB SERPL-MCNC: 0.5 MG/DL (ref 0.2–1)
BILIRUB UR QL: NEGATIVE
BUN SERPL-MCNC: 13 MG/DL (ref 7–18)
BUN/CREAT SERPL: 17 (ref 12–20)
CA-I BLD-MCNC: 9.2 MG/DL (ref 8.5–10.1)
CHLORIDE SERPL-SCNC: 102 MMOL/L (ref 100–111)
CO2 SERPL-SCNC: 30 MMOL/L (ref 21–32)
COLOR UR: ABNORMAL
CREAT SERPL-MCNC: 0.78 MG/DL (ref 0.6–1.3)
DIFFERENTIAL METHOD BLD: ABNORMAL
EOSINOPHIL # BLD: 0.01 K/UL (ref 0–0.4)
EOSINOPHIL NFR BLD: 0.1 % (ref 0–5)
EPITH CASTS URNS QL MICRO: ABNORMAL /LPF (ref 0–20)
ERYTHROCYTE [DISTWIDTH] IN BLOOD BY AUTOMATED COUNT: 14.1 % (ref 11.6–14.5)
GLOBULIN SER CALC-MCNC: 3.5 G/DL (ref 2–4)
GLUCOSE SERPL-MCNC: 107 MG/DL (ref 74–99)
GLUCOSE UR STRIP.AUTO-MCNC: NEGATIVE MG/DL
HCT VFR BLD AUTO: 37.3 % (ref 35–45)
HGB BLD-MCNC: 11.9 G/DL (ref 12–16)
HGB UR QL STRIP: NEGATIVE
IMM GRANULOCYTES # BLD AUTO: 0.06 K/UL (ref 0–0.04)
IMM GRANULOCYTES NFR BLD AUTO: 0.6 % (ref 0–0.5)
KETONES UR QL STRIP.AUTO: 80 MG/DL
LACTATE SERPL-SCNC: 1 MMOL/L (ref 0.4–2)
LACTATE SERPL-SCNC: 1.5 MMOL/L (ref 0.4–2)
LEUKOCYTE ESTERASE UR QL STRIP.AUTO: NEGATIVE
LIPASE SERPL-CCNC: 37 U/L (ref 13–75)
LYMPHOCYTES # BLD: 0.59 K/UL (ref 0.9–3.6)
LYMPHOCYTES NFR BLD: 5.7 % (ref 21–52)
MCH RBC QN AUTO: 29.1 PG (ref 24–34)
MCHC RBC AUTO-ENTMCNC: 31.9 G/DL (ref 31–37)
MCV RBC AUTO: 91.2 FL (ref 78–100)
MONOCYTES # BLD: 0.5 K/UL (ref 0.05–1.2)
MONOCYTES NFR BLD: 4.8 % (ref 3–10)
NEUTS SEG # BLD: 9.14 K/UL (ref 1.8–8)
NEUTS SEG NFR BLD: 88.6 % (ref 40–73)
NITRITE UR QL STRIP.AUTO: NEGATIVE
NRBC # BLD: 0 K/UL (ref 0–0.01)
NRBC BLD-RTO: 0 PER 100 WBC
PH UR STRIP: 5.5 (ref 5–8)
PLATELET # BLD AUTO: 408 K/UL (ref 135–420)
PMV BLD AUTO: 10 FL (ref 9.2–11.8)
POTASSIUM SERPL-SCNC: 3.7 MMOL/L (ref 3.5–5.5)
PROT SERPL-MCNC: 6.7 G/DL (ref 6.4–8.2)
PROT UR STRIP-MCNC: ABNORMAL MG/DL
RBC # BLD AUTO: 4.09 M/UL (ref 4.2–5.3)
RBC #/AREA URNS HPF: ABNORMAL /HPF (ref 0–2)
SODIUM SERPL-SCNC: 138 MMOL/L (ref 136–145)
SP GR UR REFRACTOMETRY: 1.03 (ref 1–1.03)
UROBILINOGEN UR QL STRIP.AUTO: 0.2 EU/DL (ref 0.2–1)
WBC # BLD AUTO: 10.3 K/UL (ref 4.6–13.2)
WBC URNS QL MICRO: ABNORMAL /HPF (ref 0–4)

## 2025-04-17 PROCEDURE — 1100000000 HC RM PRIVATE

## 2025-04-17 PROCEDURE — 83605 ASSAY OF LACTIC ACID: CPT

## 2025-04-17 PROCEDURE — 83690 ASSAY OF LIPASE: CPT

## 2025-04-17 PROCEDURE — 6360000002 HC RX W HCPCS: Performed by: FAMILY MEDICINE

## 2025-04-17 PROCEDURE — 6360000004 HC RX CONTRAST MEDICATION: Performed by: FAMILY MEDICINE

## 2025-04-17 PROCEDURE — 74177 CT ABD & PELVIS W/CONTRAST: CPT

## 2025-04-17 PROCEDURE — 96376 TX/PRO/DX INJ SAME DRUG ADON: CPT

## 2025-04-17 PROCEDURE — 85025 COMPLETE CBC W/AUTO DIFF WBC: CPT

## 2025-04-17 PROCEDURE — 96375 TX/PRO/DX INJ NEW DRUG ADDON: CPT

## 2025-04-17 PROCEDURE — 80053 COMPREHEN METABOLIC PANEL: CPT

## 2025-04-17 PROCEDURE — 2580000003 HC RX 258: Performed by: FAMILY MEDICINE

## 2025-04-17 PROCEDURE — 99285 EMERGENCY DEPT VISIT HI MDM: CPT

## 2025-04-17 PROCEDURE — 2500000003 HC RX 250 WO HCPCS: Performed by: INTERNAL MEDICINE

## 2025-04-17 PROCEDURE — 2580000003 HC RX 258: Performed by: INTERNAL MEDICINE

## 2025-04-17 PROCEDURE — 96365 THER/PROPH/DIAG IV INF INIT: CPT

## 2025-04-17 PROCEDURE — 6370000000 HC RX 637 (ALT 250 FOR IP): Performed by: INTERNAL MEDICINE

## 2025-04-17 PROCEDURE — 99222 1ST HOSP IP/OBS MODERATE 55: CPT | Performed by: INTERNAL MEDICINE

## 2025-04-17 PROCEDURE — 81001 URINALYSIS AUTO W/SCOPE: CPT

## 2025-04-17 PROCEDURE — 6360000002 HC RX W HCPCS: Performed by: INTERNAL MEDICINE

## 2025-04-17 PROCEDURE — 96374 THER/PROPH/DIAG INJ IV PUSH: CPT

## 2025-04-17 RX ORDER — SODIUM CHLORIDE 0.9 % (FLUSH) 0.9 %
5-40 SYRINGE (ML) INJECTION PRN
Status: DISCONTINUED | OUTPATIENT
Start: 2025-04-17 | End: 2025-04-22 | Stop reason: HOSPADM

## 2025-04-17 RX ORDER — 0.9 % SODIUM CHLORIDE 0.9 %
500 INTRAVENOUS SOLUTION INTRAVENOUS ONCE
Status: COMPLETED | OUTPATIENT
Start: 2025-04-17 | End: 2025-04-17

## 2025-04-17 RX ORDER — ONDANSETRON 4 MG/1
4 TABLET, ORALLY DISINTEGRATING ORAL EVERY 8 HOURS PRN
Status: DISCONTINUED | OUTPATIENT
Start: 2025-04-17 | End: 2025-04-22 | Stop reason: HOSPADM

## 2025-04-17 RX ORDER — ONDANSETRON 2 MG/ML
4 INJECTION INTRAMUSCULAR; INTRAVENOUS EVERY 6 HOURS PRN
Status: DISCONTINUED | OUTPATIENT
Start: 2025-04-17 | End: 2025-04-22 | Stop reason: HOSPADM

## 2025-04-17 RX ORDER — ASPIRIN 81 MG/1
81 TABLET ORAL EVERY OTHER DAY
Status: DISCONTINUED | OUTPATIENT
Start: 2025-04-17 | End: 2025-04-22 | Stop reason: HOSPADM

## 2025-04-17 RX ORDER — POTASSIUM CHLORIDE 7.45 MG/ML
10 INJECTION INTRAVENOUS PRN
Status: DISCONTINUED | OUTPATIENT
Start: 2025-04-17 | End: 2025-04-22 | Stop reason: HOSPADM

## 2025-04-17 RX ORDER — LEVOTHYROXINE SODIUM 50 UG/1
50 TABLET ORAL DAILY
Status: DISCONTINUED | OUTPATIENT
Start: 2025-04-17 | End: 2025-04-22 | Stop reason: HOSPADM

## 2025-04-17 RX ORDER — POLYETHYLENE GLYCOL 3350 17 G/17G
17 POWDER, FOR SOLUTION ORAL DAILY PRN
Status: DISCONTINUED | OUTPATIENT
Start: 2025-04-17 | End: 2025-04-22 | Stop reason: HOSPADM

## 2025-04-17 RX ORDER — ROPINIROLE 0.25 MG/1
0.5 TABLET, FILM COATED ORAL NIGHTLY
Status: DISCONTINUED | OUTPATIENT
Start: 2025-04-17 | End: 2025-04-22 | Stop reason: HOSPADM

## 2025-04-17 RX ORDER — MAGNESIUM SULFATE IN WATER 40 MG/ML
2000 INJECTION, SOLUTION INTRAVENOUS PRN
Status: DISCONTINUED | OUTPATIENT
Start: 2025-04-17 | End: 2025-04-22 | Stop reason: HOSPADM

## 2025-04-17 RX ORDER — MORPHINE SULFATE 2 MG/ML
2 INJECTION, SOLUTION INTRAMUSCULAR; INTRAVENOUS
Refills: 0 | Status: COMPLETED | OUTPATIENT
Start: 2025-04-17 | End: 2025-04-17

## 2025-04-17 RX ORDER — ACETAMINOPHEN 325 MG/1
650 TABLET ORAL EVERY 6 HOURS PRN
Status: DISCONTINUED | OUTPATIENT
Start: 2025-04-17 | End: 2025-04-22 | Stop reason: HOSPADM

## 2025-04-17 RX ORDER — SODIUM CHLORIDE 0.9 % (FLUSH) 0.9 %
5-40 SYRINGE (ML) INJECTION EVERY 12 HOURS SCHEDULED
Status: DISCONTINUED | OUTPATIENT
Start: 2025-04-17 | End: 2025-04-22 | Stop reason: HOSPADM

## 2025-04-17 RX ORDER — ACETAMINOPHEN 650 MG/1
650 SUPPOSITORY RECTAL EVERY 6 HOURS PRN
Status: DISCONTINUED | OUTPATIENT
Start: 2025-04-17 | End: 2025-04-22 | Stop reason: HOSPADM

## 2025-04-17 RX ORDER — PREGABALIN 50 MG/1
50 CAPSULE ORAL DAILY
Status: DISCONTINUED | OUTPATIENT
Start: 2025-04-17 | End: 2025-04-22 | Stop reason: HOSPADM

## 2025-04-17 RX ORDER — ENOXAPARIN SODIUM 100 MG/ML
40 INJECTION SUBCUTANEOUS DAILY
Status: DISCONTINUED | OUTPATIENT
Start: 2025-04-18 | End: 2025-04-22 | Stop reason: HOSPADM

## 2025-04-17 RX ORDER — IOPAMIDOL 755 MG/ML
100 INJECTION, SOLUTION INTRAVASCULAR
Status: COMPLETED | OUTPATIENT
Start: 2025-04-17 | End: 2025-04-17

## 2025-04-17 RX ORDER — MORPHINE SULFATE 2 MG/ML
2 INJECTION, SOLUTION INTRAMUSCULAR; INTRAVENOUS EVERY 4 HOURS PRN
Refills: 0 | Status: DISCONTINUED | OUTPATIENT
Start: 2025-04-17 | End: 2025-04-22 | Stop reason: HOSPADM

## 2025-04-17 RX ORDER — SODIUM CHLORIDE, SODIUM LACTATE, POTASSIUM CHLORIDE, CALCIUM CHLORIDE 600; 310; 30; 20 MG/100ML; MG/100ML; MG/100ML; MG/100ML
INJECTION, SOLUTION INTRAVENOUS CONTINUOUS
Status: DISCONTINUED | OUTPATIENT
Start: 2025-04-17 | End: 2025-04-20

## 2025-04-17 RX ORDER — SODIUM CHLORIDE 9 MG/ML
INJECTION, SOLUTION INTRAVENOUS PRN
Status: DISCONTINUED | OUTPATIENT
Start: 2025-04-17 | End: 2025-04-22 | Stop reason: HOSPADM

## 2025-04-17 RX ORDER — EZETIMIBE 10 MG/1
10 TABLET ORAL DAILY
Status: DISCONTINUED | OUTPATIENT
Start: 2025-04-17 | End: 2025-04-22 | Stop reason: HOSPADM

## 2025-04-17 RX ORDER — AMLODIPINE BESYLATE 5 MG/1
5 TABLET ORAL DAILY
Status: DISCONTINUED | OUTPATIENT
Start: 2025-04-17 | End: 2025-04-22 | Stop reason: HOSPADM

## 2025-04-17 RX ORDER — MORPHINE SULFATE 2 MG/ML
2 INJECTION, SOLUTION INTRAMUSCULAR; INTRAVENOUS
Status: DISCONTINUED | OUTPATIENT
Start: 2025-04-17 | End: 2025-04-17 | Stop reason: HOSPADM

## 2025-04-17 RX ORDER — ONDANSETRON 2 MG/ML
4 INJECTION INTRAMUSCULAR; INTRAVENOUS
Status: COMPLETED | OUTPATIENT
Start: 2025-04-17 | End: 2025-04-17

## 2025-04-17 RX ADMIN — MORPHINE SULFATE 2 MG: 2 INJECTION, SOLUTION INTRAMUSCULAR; INTRAVENOUS at 12:10

## 2025-04-17 RX ADMIN — ONDANSETRON 4 MG: 2 INJECTION, SOLUTION INTRAMUSCULAR; INTRAVENOUS at 09:11

## 2025-04-17 RX ADMIN — ONDANSETRON 4 MG: 2 INJECTION, SOLUTION INTRAMUSCULAR; INTRAVENOUS at 16:38

## 2025-04-17 RX ADMIN — SODIUM CHLORIDE, PRESERVATIVE FREE 10 ML: 5 INJECTION INTRAVENOUS at 21:41

## 2025-04-17 RX ADMIN — ONDANSETRON 4 MG: 2 INJECTION, SOLUTION INTRAMUSCULAR; INTRAVENOUS at 20:37

## 2025-04-17 RX ADMIN — SODIUM CHLORIDE, SODIUM LACTATE, POTASSIUM CHLORIDE, AND CALCIUM CHLORIDE: 600; 310; 30; 20 INJECTION, SOLUTION INTRAVENOUS at 21:41

## 2025-04-17 RX ADMIN — SODIUM CHLORIDE 500 ML: 0.9 INJECTION, SOLUTION INTRAVENOUS at 14:15

## 2025-04-17 RX ADMIN — MORPHINE SULFATE 2 MG: 2 INJECTION, SOLUTION INTRAMUSCULAR; INTRAVENOUS at 15:32

## 2025-04-17 RX ADMIN — IOPAMIDOL 100 ML: 755 INJECTION, SOLUTION INTRAVENOUS at 10:09

## 2025-04-17 RX ADMIN — MORPHINE SULFATE 2 MG: 2 INJECTION, SOLUTION INTRAMUSCULAR; INTRAVENOUS at 09:19

## 2025-04-17 RX ADMIN — MORPHINE SULFATE 2 MG: 2 INJECTION, SOLUTION INTRAMUSCULAR; INTRAVENOUS at 20:37

## 2025-04-17 RX ADMIN — PIPERACILLIN AND TAZOBACTAM 4500 MG: 4; .5 INJECTION, POWDER, FOR SOLUTION INTRAVENOUS at 12:00

## 2025-04-17 RX ADMIN — LEVOTHYROXINE SODIUM 50 MCG: 0.05 TABLET ORAL at 21:47

## 2025-04-17 ASSESSMENT — PAIN SCALES - GENERAL
PAINLEVEL_OUTOF10: 6
PAINLEVEL_OUTOF10: 6
PAINLEVEL_OUTOF10: 3
PAINLEVEL_OUTOF10: 2
PAINLEVEL_OUTOF10: 3
PAINLEVEL_OUTOF10: 2
PAINLEVEL_OUTOF10: 6
PAINLEVEL_OUTOF10: 5

## 2025-04-17 ASSESSMENT — PAIN DESCRIPTION - DESCRIPTORS
DESCRIPTORS: ACHING;CRAMPING
DESCRIPTORS: CRAMPING
DESCRIPTORS: CRAMPING
DESCRIPTORS: SHARP;CRAMPING
DESCRIPTORS: CRAMPING
DESCRIPTORS: CRAMPING

## 2025-04-17 ASSESSMENT — PAIN DESCRIPTION - LOCATION
LOCATION: ABDOMEN

## 2025-04-17 ASSESSMENT — PAIN DESCRIPTION - PAIN TYPE: TYPE: ACUTE PAIN

## 2025-04-17 ASSESSMENT — LIFESTYLE VARIABLES
HOW MANY STANDARD DRINKS CONTAINING ALCOHOL DO YOU HAVE ON A TYPICAL DAY: PATIENT DOES NOT DRINK
HOW OFTEN DO YOU HAVE A DRINK CONTAINING ALCOHOL: NEVER

## 2025-04-17 ASSESSMENT — PAIN DESCRIPTION - ORIENTATION
ORIENTATION: MID

## 2025-04-17 ASSESSMENT — PAIN DESCRIPTION - FREQUENCY: FREQUENCY: INTERMITTENT

## 2025-04-17 ASSESSMENT — PAIN - FUNCTIONAL ASSESSMENT
PAIN_FUNCTIONAL_ASSESSMENT: PREVENTS OR INTERFERES SOME ACTIVE ACTIVITIES AND ADLS
PAIN_FUNCTIONAL_ASSESSMENT: 0-10

## 2025-04-17 ASSESSMENT — PAIN DESCRIPTION - ONSET: ONSET: GRADUAL

## 2025-04-17 NOTE — ED NOTES
Patient medicated for nausea, ambulated to bathroom, urine specimen provided, patient back on stretcher and medicated for pain.

## 2025-04-17 NOTE — ED TRIAGE NOTES
Patient c/o abdominal pain since yesterday with nausea, dry heaves-no vomiting. Reports loose stool x 4 yesterday. Denies fever.

## 2025-04-17 NOTE — ED PROVIDER NOTES
[facility] EMERGENCY DEPT  EMERGENCY DEPARTMENT HISTORY AND PHYSICAL EXAM      Date of evaluation: 4/17/2025  Patient Name: Yodit Yoo  Birthdate 1950  MRN: [id number]  ED Provider: Luigi Miles DO   Note Started: 9:02 AM EDT 4/17/25    HISTORY OF PRESENT ILLNESS     Chief Complaint   Patient presents with    Abdominal Pain    Nausea       History Provided By: Patient,     HPI: Yodit Yoo is a 74 y.o. female patient comes in stating she has been having abdominal pain since yesterday with some nausea dry heaves vomiting, she had some loose stool yesterday, denies any fevers has not passed any gas or bowel movement today.  Has a history of adhesions as well as complete bowel blockages.  Last surgery was 2020.  Has not take anything for the pain.  She states the pain can be severe at times as a 8 out of 10 but currently 4 out of 10.  Standing makes it worse    PAST MEDICAL HISTORY   Past Medical History:  Past Medical History:   Diagnosis Date    Abdominal pain     Allergies     Alpha-gal syndrome     Arthritis     Bowel obstruction (HCC) 2019    stricture small bowel    Diarrhea     GERD (gastroesophageal reflux disease) 2018    High cholesterol     Hypertension     Hypothyroidism approx. 10 years ago    low TSH    Kidney disease     Nausea & vomiting     Osteoarthritis approx. 20 years ago    Restless legs syndrome approx. 10 years ago    Thyroid disease        Past Surgical History:  Past Surgical History:   Procedure Laterality Date    BREAST SURGERY Right     biopsyr    CAPSULE ENDOSCOPY N/A 02/25/2020    COLONOSCOPY  07/08/2016    COLONOSCOPY  02/07/2020    JOINT REPLACEMENT  2014    Bilat. hip    ORTHOPEDIC SURGERY      both hips replaced, carpal tunnel b/l,     OTHER SURGICAL HISTORY      ovary removed    OTHER SURGICAL HISTORY  01/24/2019    Exploratory Lap    SMALL INTESTINE SURGERY  3/6/20    TUBAL LIGATION  1991    UPPER GASTROINTESTINAL ENDOSCOPY  02/07/2020    UPPER GASTROINTESTINAL ENDOSCOPY

## 2025-04-18 ENCOUNTER — ANESTHESIA (OUTPATIENT)
Facility: HOSPITAL | Age: 75
End: 2025-04-18
Payer: MEDICARE

## 2025-04-18 ENCOUNTER — APPOINTMENT (OUTPATIENT)
Facility: HOSPITAL | Age: 75
DRG: 330 | End: 2025-04-18
Attending: INTERNAL MEDICINE
Payer: MEDICARE

## 2025-04-18 ENCOUNTER — ANESTHESIA EVENT (OUTPATIENT)
Facility: HOSPITAL | Age: 75
End: 2025-04-18
Payer: MEDICARE

## 2025-04-18 PROBLEM — K56.609 SMALL BOWEL OBSTRUCTION (HCC): Status: RESOLVED | Noted: 2025-04-17 | Resolved: 2025-04-18

## 2025-04-18 LAB
ALBUMIN SERPL-MCNC: 2.5 G/DL (ref 3.4–5)
ALBUMIN/GLOB SERPL: 0.8 (ref 0.8–1.7)
ALP SERPL-CCNC: 44 U/L (ref 45–117)
ALT SERPL-CCNC: 15 U/L (ref 13–56)
ANION GAP SERPL CALC-SCNC: 7 MMOL/L (ref 3–18)
AST SERPL-CCNC: 13 U/L (ref 10–38)
BASOPHILS # BLD: 0.04 K/UL (ref 0–0.1)
BASOPHILS NFR BLD: 0.4 % (ref 0–2)
BILIRUB SERPL-MCNC: 0.4 MG/DL (ref 0.2–1)
BUN SERPL-MCNC: 19 MG/DL (ref 7–18)
BUN/CREAT SERPL: 25 (ref 12–20)
CALCIUM SERPL-MCNC: 8.9 MG/DL (ref 8.5–10.1)
CHLORIDE SERPL-SCNC: 105 MMOL/L (ref 100–111)
CO2 SERPL-SCNC: 25 MMOL/L (ref 21–32)
CREAT SERPL-MCNC: 0.77 MG/DL (ref 0.6–1.3)
DIFFERENTIAL METHOD BLD: ABNORMAL
EOSINOPHIL # BLD: 0 K/UL (ref 0–0.4)
EOSINOPHIL NFR BLD: 0 % (ref 0–5)
ERYTHROCYTE [DISTWIDTH] IN BLOOD BY AUTOMATED COUNT: 14.2 % (ref 11.6–14.5)
FERRITIN SERPL-MCNC: 33 NG/ML (ref 8–388)
GLOBULIN SER CALC-MCNC: 3.1 G/DL (ref 2–4)
GLUCOSE SERPL-MCNC: 88 MG/DL (ref 74–99)
HCT VFR BLD AUTO: 33 % (ref 35–45)
HGB BLD-MCNC: 10.3 G/DL (ref 12–16)
IMM GRANULOCYTES # BLD AUTO: 0.02 K/UL (ref 0–0.04)
IMM GRANULOCYTES NFR BLD AUTO: 0.2 % (ref 0–0.5)
IRON SATN MFR SERPL: 7 % (ref 20–50)
IRON SERPL-MCNC: 19 UG/DL (ref 50–175)
LYMPHOCYTES # BLD: 0.64 K/UL (ref 0.9–3.6)
LYMPHOCYTES NFR BLD: 7 % (ref 21–52)
MCH RBC QN AUTO: 29 PG (ref 24–34)
MCHC RBC AUTO-ENTMCNC: 31.2 G/DL (ref 31–37)
MCV RBC AUTO: 93 FL (ref 78–100)
MONOCYTES # BLD: 0.9 K/UL (ref 0.05–1.2)
MONOCYTES NFR BLD: 9.9 % (ref 3–10)
NEUTS SEG # BLD: 7.51 K/UL (ref 1.8–8)
NEUTS SEG NFR BLD: 82.5 % (ref 40–73)
NRBC # BLD: 0 K/UL (ref 0–0.01)
NRBC BLD-RTO: 0 PER 100 WBC
PLATELET # BLD AUTO: 353 K/UL (ref 135–420)
PMV BLD AUTO: 10.5 FL (ref 9.2–11.8)
POTASSIUM SERPL-SCNC: 3.9 MMOL/L (ref 3.5–5.5)
PROT SERPL-MCNC: 5.6 G/DL (ref 6.4–8.2)
RBC # BLD AUTO: 3.55 M/UL (ref 4.2–5.3)
SODIUM SERPL-SCNC: 137 MMOL/L (ref 136–145)
TIBC SERPL-MCNC: 266 UG/DL (ref 250–450)
WBC # BLD AUTO: 9.1 K/UL (ref 4.6–13.2)

## 2025-04-18 PROCEDURE — 2720000010 HC SURG SUPPLY STERILE: Performed by: SURGERY

## 2025-04-18 PROCEDURE — 6360000002 HC RX W HCPCS: Performed by: SURGERY

## 2025-04-18 PROCEDURE — 0DB80ZZ EXCISION OF SMALL INTESTINE, OPEN APPROACH: ICD-10-PCS | Performed by: SURGERY

## 2025-04-18 PROCEDURE — 3700000001 HC ADD 15 MINUTES (ANESTHESIA): Performed by: SURGERY

## 2025-04-18 PROCEDURE — 44120 REMOVAL OF SMALL INTESTINE: CPT | Performed by: SURGERY

## 2025-04-18 PROCEDURE — 6360000002 HC RX W HCPCS: Performed by: NURSE ANESTHETIST, CERTIFIED REGISTERED

## 2025-04-18 PROCEDURE — 93005 ELECTROCARDIOGRAM TRACING: CPT | Performed by: ANESTHESIOLOGY

## 2025-04-18 PROCEDURE — 3600000012 HC SURGERY LEVEL 2 ADDTL 15MIN: Performed by: SURGERY

## 2025-04-18 PROCEDURE — 2580000003 HC RX 258: Performed by: INTERNAL MEDICINE

## 2025-04-18 PROCEDURE — 6360000002 HC RX W HCPCS: Performed by: INTERNAL MEDICINE

## 2025-04-18 PROCEDURE — 7100000001 HC PACU RECOVERY - ADDTL 15 MIN: Performed by: SURGERY

## 2025-04-18 PROCEDURE — C1765 ADHESION BARRIER: HCPCS | Performed by: SURGERY

## 2025-04-18 PROCEDURE — 3600000002 HC SURGERY LEVEL 2 BASE: Performed by: SURGERY

## 2025-04-18 PROCEDURE — 82728 ASSAY OF FERRITIN: CPT

## 2025-04-18 PROCEDURE — 2500000003 HC RX 250 WO HCPCS: Performed by: SURGERY

## 2025-04-18 PROCEDURE — 83540 ASSAY OF IRON: CPT

## 2025-04-18 PROCEDURE — 88307 TISSUE EXAM BY PATHOLOGIST: CPT

## 2025-04-18 PROCEDURE — 97165 OT EVAL LOW COMPLEX 30 MIN: CPT

## 2025-04-18 PROCEDURE — 99222 1ST HOSP IP/OBS MODERATE 55: CPT | Performed by: SURGERY

## 2025-04-18 PROCEDURE — 80053 COMPREHEN METABOLIC PANEL: CPT

## 2025-04-18 PROCEDURE — 3700000000 HC ANESTHESIA ATTENDED CARE: Performed by: SURGERY

## 2025-04-18 PROCEDURE — 7100000000 HC PACU RECOVERY - FIRST 15 MIN: Performed by: SURGERY

## 2025-04-18 PROCEDURE — 2500000003 HC RX 250 WO HCPCS: Performed by: INTERNAL MEDICINE

## 2025-04-18 PROCEDURE — 99232 SBSQ HOSP IP/OBS MODERATE 35: CPT | Performed by: STUDENT IN AN ORGANIZED HEALTH CARE EDUCATION/TRAINING PROGRAM

## 2025-04-18 PROCEDURE — 83550 IRON BINDING TEST: CPT

## 2025-04-18 PROCEDURE — 2500000003 HC RX 250 WO HCPCS: Performed by: NURSE ANESTHETIST, CERTIFIED REGISTERED

## 2025-04-18 PROCEDURE — 74018 RADEX ABDOMEN 1 VIEW: CPT

## 2025-04-18 PROCEDURE — 85025 COMPLETE CBC W/AUTO DIFF WBC: CPT

## 2025-04-18 PROCEDURE — 36415 COLL VENOUS BLD VENIPUNCTURE: CPT

## 2025-04-18 PROCEDURE — 2709999900 HC NON-CHARGEABLE SUPPLY: Performed by: SURGERY

## 2025-04-18 PROCEDURE — 97116 GAIT TRAINING THERAPY: CPT

## 2025-04-18 PROCEDURE — 1100000000 HC RM PRIVATE

## 2025-04-18 PROCEDURE — 97161 PT EVAL LOW COMPLEX 20 MIN: CPT

## 2025-04-18 PROCEDURE — 6370000000 HC RX 637 (ALT 250 FOR IP): Performed by: INTERNAL MEDICINE

## 2025-04-18 PROCEDURE — 0DJD4ZZ INSPECTION OF LOWER INTESTINAL TRACT, PERCUTANEOUS ENDOSCOPIC APPROACH: ICD-10-PCS | Performed by: SURGERY

## 2025-04-18 DEVICE — PACK PROC HALF SHT W3XL5IN SOD HYALURONATE: Type: IMPLANTABLE DEVICE | Site: ABDOMEN | Status: FUNCTIONAL

## 2025-04-18 RX ORDER — NALOXONE HYDROCHLORIDE 0.4 MG/ML
INJECTION, SOLUTION INTRAMUSCULAR; INTRAVENOUS; SUBCUTANEOUS PRN
Status: DISCONTINUED | OUTPATIENT
Start: 2025-04-18 | End: 2025-04-18 | Stop reason: HOSPADM

## 2025-04-18 RX ORDER — EPHEDRINE SULFATE/0.9% NACL/PF 50 MG/5 ML
SYRINGE (ML) INTRAVENOUS
Status: DISCONTINUED | OUTPATIENT
Start: 2025-04-18 | End: 2025-04-18 | Stop reason: SDUPTHER

## 2025-04-18 RX ORDER — HYDROMORPHONE HYDROCHLORIDE 1 MG/ML
1 INJECTION, SOLUTION INTRAMUSCULAR; INTRAVENOUS; SUBCUTANEOUS
Status: COMPLETED | OUTPATIENT
Start: 2025-04-18 | End: 2025-04-18

## 2025-04-18 RX ORDER — ONDANSETRON 2 MG/ML
4 INJECTION INTRAMUSCULAR; INTRAVENOUS
Status: COMPLETED | OUTPATIENT
Start: 2025-04-18 | End: 2025-04-18

## 2025-04-18 RX ORDER — SODIUM CHLORIDE, SODIUM LACTATE, POTASSIUM CHLORIDE, CALCIUM CHLORIDE 600; 310; 30; 20 MG/100ML; MG/100ML; MG/100ML; MG/100ML
INJECTION, SOLUTION INTRAVENOUS CONTINUOUS
Status: DISCONTINUED | OUTPATIENT
Start: 2025-04-18 | End: 2025-04-18 | Stop reason: HOSPADM

## 2025-04-18 RX ORDER — MAGNESIUM SULFATE HEPTAHYDRATE 500 MG/ML
INJECTION, SOLUTION INTRAMUSCULAR; INTRAVENOUS
Status: DISCONTINUED | OUTPATIENT
Start: 2025-04-18 | End: 2025-04-18 | Stop reason: SDUPTHER

## 2025-04-18 RX ORDER — FENTANYL CITRATE 50 UG/ML
INJECTION, SOLUTION INTRAMUSCULAR; INTRAVENOUS
Status: DISCONTINUED | OUTPATIENT
Start: 2025-04-18 | End: 2025-04-18 | Stop reason: SDUPTHER

## 2025-04-18 RX ORDER — SUCCINYLCHOLINE/SOD CL,ISO/PF 100 MG/5ML
SYRINGE (ML) INTRAVENOUS
Status: DISCONTINUED | OUTPATIENT
Start: 2025-04-18 | End: 2025-04-18 | Stop reason: SDUPTHER

## 2025-04-18 RX ORDER — MAGNESIUM HYDROXIDE 1200 MG/15ML
LIQUID ORAL CONTINUOUS PRN
Status: COMPLETED | OUTPATIENT
Start: 2025-04-18 | End: 2025-04-18

## 2025-04-18 RX ORDER — ONDANSETRON 2 MG/ML
INJECTION INTRAMUSCULAR; INTRAVENOUS
Status: DISCONTINUED | OUTPATIENT
Start: 2025-04-18 | End: 2025-04-18 | Stop reason: SDUPTHER

## 2025-04-18 RX ORDER — ROCURONIUM BROMIDE 10 MG/ML
INJECTION, SOLUTION INTRAVENOUS
Status: DISCONTINUED | OUTPATIENT
Start: 2025-04-18 | End: 2025-04-18 | Stop reason: SDUPTHER

## 2025-04-18 RX ORDER — LABETALOL HYDROCHLORIDE 5 MG/ML
10 INJECTION, SOLUTION INTRAVENOUS
Status: DISCONTINUED | OUTPATIENT
Start: 2025-04-18 | End: 2025-04-18 | Stop reason: HOSPADM

## 2025-04-18 RX ORDER — KETOROLAC TROMETHAMINE 15 MG/ML
INJECTION, SOLUTION INTRAMUSCULAR; INTRAVENOUS
Status: DISCONTINUED | OUTPATIENT
Start: 2025-04-18 | End: 2025-04-18 | Stop reason: SDUPTHER

## 2025-04-18 RX ORDER — MIDAZOLAM HYDROCHLORIDE 1 MG/ML
INJECTION, SOLUTION INTRAMUSCULAR; INTRAVENOUS
Status: DISCONTINUED | OUTPATIENT
Start: 2025-04-18 | End: 2025-04-18 | Stop reason: SDUPTHER

## 2025-04-18 RX ORDER — FENTANYL CITRATE 50 UG/ML
50 INJECTION, SOLUTION INTRAMUSCULAR; INTRAVENOUS EVERY 5 MIN PRN
Status: DISCONTINUED | OUTPATIENT
Start: 2025-04-18 | End: 2025-04-18 | Stop reason: HOSPADM

## 2025-04-18 RX ADMIN — Medication 30 MG: at 16:06

## 2025-04-18 RX ADMIN — ONDANSETRON 4 MG: 2 INJECTION, SOLUTION INTRAMUSCULAR; INTRAVENOUS at 17:17

## 2025-04-18 RX ADMIN — AMLODIPINE BESYLATE 5 MG: 5 TABLET ORAL at 10:41

## 2025-04-18 RX ADMIN — ONDANSETRON 4 MG: 2 INJECTION, SOLUTION INTRAMUSCULAR; INTRAVENOUS at 02:13

## 2025-04-18 RX ADMIN — FENTANYL CITRATE 50 MCG: 50 INJECTION INTRAMUSCULAR; INTRAVENOUS at 15:58

## 2025-04-18 RX ADMIN — ROPINIROLE HYDROCHLORIDE 0.5 MG: 0.25 TABLET, FILM COATED ORAL at 21:06

## 2025-04-18 RX ADMIN — KETOROLAC TROMETHAMINE 15 MG: 15 INJECTION, SOLUTION INTRAMUSCULAR; INTRAVENOUS at 17:16

## 2025-04-18 RX ADMIN — SUGAMMADEX 200 MG: 100 INJECTION, SOLUTION INTRAVENOUS at 17:23

## 2025-04-18 RX ADMIN — SODIUM CHLORIDE, SODIUM LACTATE, POTASSIUM CHLORIDE, AND CALCIUM CHLORIDE: 600; 310; 30; 20 INJECTION, SOLUTION INTRAVENOUS at 16:51

## 2025-04-18 RX ADMIN — FENTANYL CITRATE 50 MCG: 50 INJECTION INTRAMUSCULAR; INTRAVENOUS at 15:59

## 2025-04-18 RX ADMIN — MORPHINE SULFATE 2 MG: 2 INJECTION, SOLUTION INTRAMUSCULAR; INTRAVENOUS at 11:50

## 2025-04-18 RX ADMIN — HYDROMORPHONE HYDROCHLORIDE 1 MG: 1 INJECTION, SOLUTION INTRAMUSCULAR; INTRAVENOUS; SUBCUTANEOUS at 06:28

## 2025-04-18 RX ADMIN — LIDOCAINE HYDROCHLORIDE 60 MG: 20 INJECTION, SOLUTION EPIDURAL; INFILTRATION; INTRACAUDAL; PERINEURAL at 16:00

## 2025-04-18 RX ADMIN — HYDROMORPHONE HYDROCHLORIDE 0.5 MG: 1 INJECTION, SOLUTION INTRAMUSCULAR; INTRAVENOUS; SUBCUTANEOUS at 19:01

## 2025-04-18 RX ADMIN — ROCURONIUM BROMIDE 30 MG: 50 INJECTION INTRAVENOUS at 16:04

## 2025-04-18 RX ADMIN — Medication 100 MG: at 16:00

## 2025-04-18 RX ADMIN — MAGNESIUM SULFATE HEPTAHYDRATE 1 G: 500 INJECTION, SOLUTION INTRAMUSCULAR; INTRAVENOUS at 17:18

## 2025-04-18 RX ADMIN — WATER 2000 MG: 1 INJECTION INTRAMUSCULAR; INTRAVENOUS; SUBCUTANEOUS at 16:06

## 2025-04-18 RX ADMIN — ONDANSETRON 4 MG: 2 INJECTION, SOLUTION INTRAMUSCULAR; INTRAVENOUS at 18:00

## 2025-04-18 RX ADMIN — MIDAZOLAM 2 MG: 1 INJECTION, SOLUTION INTRAMUSCULAR; INTRAVENOUS at 15:58

## 2025-04-18 RX ADMIN — PROPOFOL 100 MG: 10 INJECTION, EMULSION INTRAVENOUS at 16:00

## 2025-04-18 RX ADMIN — ROCURONIUM BROMIDE 20 MG: 50 INJECTION INTRAVENOUS at 16:41

## 2025-04-18 RX ADMIN — SODIUM CHLORIDE, SODIUM LACTATE, POTASSIUM CHLORIDE, AND CALCIUM CHLORIDE: 600; 310; 30; 20 INJECTION, SOLUTION INTRAVENOUS at 10:27

## 2025-04-18 RX ADMIN — SODIUM CHLORIDE, PRESERVATIVE FREE 10 ML: 5 INJECTION INTRAVENOUS at 21:06

## 2025-04-18 RX ADMIN — SODIUM CHLORIDE, SODIUM LACTATE, POTASSIUM CHLORIDE, AND CALCIUM CHLORIDE: 600; 310; 30; 20 INJECTION, SOLUTION INTRAVENOUS at 18:54

## 2025-04-18 RX ADMIN — MORPHINE SULFATE 2 MG: 2 INJECTION, SOLUTION INTRAMUSCULAR; INTRAVENOUS at 02:12

## 2025-04-18 ASSESSMENT — PAIN DESCRIPTION - DESCRIPTORS
DESCRIPTORS: CRAMPING
DESCRIPTORS: ACHING
DESCRIPTORS: SHARP
DESCRIPTORS: CRAMPING

## 2025-04-18 ASSESSMENT — PAIN SCALES - GENERAL
PAINLEVEL_OUTOF10: 0
PAINLEVEL_OUTOF10: 8
PAINLEVEL_OUTOF10: 0
PAINLEVEL_OUTOF10: 0
PAINLEVEL_OUTOF10: 2
PAINLEVEL_OUTOF10: 8
PAINLEVEL_OUTOF10: 0
PAINLEVEL_OUTOF10: 6
PAINLEVEL_OUTOF10: 7
PAINLEVEL_OUTOF10: 2
PAINLEVEL_OUTOF10: 0

## 2025-04-18 ASSESSMENT — PAIN DESCRIPTION - ONSET
ONSET: GRADUAL

## 2025-04-18 ASSESSMENT — PAIN - FUNCTIONAL ASSESSMENT
PAIN_FUNCTIONAL_ASSESSMENT: ACTIVITIES ARE NOT PREVENTED
PAIN_FUNCTIONAL_ASSESSMENT: 0-10
PAIN_FUNCTIONAL_ASSESSMENT: ACTIVITIES ARE NOT PREVENTED

## 2025-04-18 ASSESSMENT — PAIN DESCRIPTION - FREQUENCY
FREQUENCY: INTERMITTENT

## 2025-04-18 ASSESSMENT — PAIN DESCRIPTION - PAIN TYPE
TYPE: ACUTE PAIN
TYPE: ACUTE PAIN
TYPE: SURGICAL PAIN

## 2025-04-18 ASSESSMENT — PAIN DESCRIPTION - LOCATION
LOCATION: ABDOMEN

## 2025-04-18 ASSESSMENT — PAIN DESCRIPTION - ORIENTATION
ORIENTATION: MID
ORIENTATION: MID;LOWER
ORIENTATION: ANTERIOR
ORIENTATION: MID

## 2025-04-18 NOTE — ANESTHESIA PRE PROCEDURE
Results   Component Value Date/Time     04/18/2025 04:24 AM    K 3.9 04/18/2025 04:24 AM     04/18/2025 04:24 AM    CO2 25 04/18/2025 04:24 AM    BUN 19 04/18/2025 04:24 AM    CREATININE 0.77 04/18/2025 04:24 AM    GFRAA >60 06/08/2021 08:20 AM    AGRATIO 1.2 06/08/2021 08:20 AM    LABGLOM 82 04/18/2025 04:24 AM    LABGLOM >60 06/22/2023 09:22 AM    GLUCOSE 88 04/18/2025 04:24 AM    CALCIUM 8.9 04/18/2025 04:24 AM    BILITOT 0.4 04/18/2025 04:24 AM    ALKPHOS 44 04/18/2025 04:24 AM    ALKPHOS 48 06/08/2021 08:20 AM    AST 13 04/18/2025 04:24 AM    ALT 15 04/18/2025 04:24 AM       POC Tests: No results for input(s): \"POCGLU\", \"POCNA\", \"POCK\", \"POCCL\", \"POCBUN\", \"POCHEMO\", \"POCHCT\" in the last 72 hours.    Coags: No results found for: \"PROTIME\", \"INR\", \"APTT\"    HCG (If Applicable): No results found for: \"PREGTESTUR\", \"PREGSERUM\", \"HCG\", \"HCGQUANT\"     ABGs: No results found for: \"PHART\", \"PO2ART\", \"IHQ5FZC\", \"SNV7UEO\", \"BEART\", \"S4EDPEPB\"     Type & Screen (If Applicable):  No results found for: \"LABABO\"    Drug/Infectious Status (If Applicable):  No results found for: \"HIV\", \"HEPCAB\"    COVID-19 Screening (If Applicable): No results found for: \"COVID19\"        Anesthesia Evaluation  Patient summary reviewed   no history of anesthetic complications:   Airway: Mallampati: II  TM distance: <3 FB   Neck ROM: full  Mouth opening: > = 3 FB   Dental: normal exam         Pulmonary:Negative Pulmonary ROS and normal exam                               Cardiovascular:    (+) hypertension: no interval change      ECG reviewed      Echocardiogram reviewed  Stress test reviewed                Neuro/Psych:   (+) neuromuscular disease:            GI/Hepatic/Renal:   (+) GERD: no interval change          Endo/Other:    (+) hypothyroidism::..                 Abdominal: normal exam            Vascular: negative vascular ROS.         Other Findings:       Anesthesia Plan      general     ASA 3 - emergent       Induction:

## 2025-04-18 NOTE — PERIOP NOTE
Patient /Family /Designee has been informed that [facility] is not responsible for patient belongings per policy and the signed [facility] Patient Agreement document.  Personal items should be sent home or checked in with security.  Patient /Family /Designee selected the following action:                            [x]  Send personal items home with a family member or friend                                                 []  Check in personal items with security, excluding clothing                            []  Maintain personal items at the bedside, against recommendation                                 by Darin Blank [facility]                                 All items given to  at bedside

## 2025-04-18 NOTE — H&P
History & Physical    Patient: Yodit Yoo MRN: [id number]  CSN: [id number]    YOB: 1950  Age: 74 y.o.  Sex: female      DOA: 4/17/2025    Chief Complaint: SBO      HPI:     Yodit Yoo is a 74 y.o.  female who. Patient reports her abdominal pain started 3 days ago. She has had bowel obstructions before.She had surgery for a SBO in 2020 at [facility] in [place], [place]. She had had difficulty keeping food and medicine and liquids down. Has had multiple episodes of nausea and vomiting at home.  Currently lives at home with her  in [place], [place]. 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    Once the patient arrived to [facility] the patient was afebrile, with respiratory rate of 16, heart rate of 67 and a blood pressure 137/69 the patient was 98% on room air.  Labs were notable for glucose of 107, a albumin of 3.2, hemoglobin of 11.9.  CT scan showed a small bowel obstruction.  Patient given pain medicine and fluids and admitted for further workup.    Past Medical History:   Diagnosis Date    Abdominal pain     Allergies     Alpha-gal syndrome     Arthritis     Bowel obstruction (HCC) 2019    stricture small bowel    Diarrhea     GERD (gastroesophageal reflux disease) 2018    High cholesterol     Hypertension     Hypothyroidism approx. 10 years ago    low TSH    Kidney disease     Nausea & vomiting     Osteoarthritis approx. 20 years ago    Restless legs syndrome approx. 10 years ago    Thyroid disease        Past Surgical History:   Procedure Laterality Date    BREAST SURGERY Right     biopsyr    CAPSULE ENDOSCOPY N/A 02/25/2020    COLONOSCOPY  07/08/2016    COLONOSCOPY  02/07/2020    JOINT REPLACEMENT  2014    Bilat. hip    ORTHOPEDIC SURGERY      both hips replaced, carpal tunnel b/l,     OTHER SURGICAL HISTORY      ovary removed    OTHER SURGICAL HISTORY  01/24/2019    Exploratory Lap    SMALL INTESTINE SURGERY  3/6/20    TUBAL

## 2025-04-18 NOTE — CONSULTS
0-4 0 - 4 /hpf    RBC, UA 5-10 0 - 2 /hpf    Epithelial Cells, UA Few 0 - 20 /lpf    BACTERIA, URINE 2+ (A) Negative /hpf   Lactic Acid    Collection Time: 04/17/25 11:05 AM   Result Value Ref Range    Lactic Acid, Plasma 1.0 0.4 - 2.0 mmol/L   CBC with Auto Differential    Collection Time: 04/18/25  4:24 AM   Result Value Ref Range    WBC 9.1 4.6 - 13.2 K/uL    RBC 3.55 (L) 4.20 - 5.30 M/uL    Hemoglobin 10.3 (L) 12.0 - 16.0 g/dL    Hematocrit 33.0 (L) 35.0 - 45.0 %    MCV 93.0 78.0 - 100.0 FL    MCH 29.0 24.0 - 34.0 PG    MCHC 31.2 31.0 - 37.0 g/dL    RDW 14.2 11.6 - 14.5 %    Platelets 353 135 - 420 K/uL    MPV 10.5 9.2 - 11.8 FL    Nucleated RBCs 0.0 0  WBC    nRBC 0.00 0.00 - 0.01 K/uL    Neutrophils % 82.5 (H) 40.0 - 73.0 %    Lymphocytes % 7.0 (L) 21.0 - 52.0 %    Monocytes % 9.9 3.0 - 10.0 %    Eosinophils % 0.0 0.0 - 5.0 %    Basophils % 0.4 0.0 - 2.0 %    Immature Granulocytes % 0.2 0.0 - 0.5 %    Neutrophils Absolute 7.51 1.80 - 8.00 K/UL    Lymphocytes Absolute 0.64 (L) 0.90 - 3.60 K/UL    Monocytes Absolute 0.90 0.05 - 1.20 K/UL    Eosinophils Absolute 0.00 0.00 - 0.40 K/UL    Basophils Absolute 0.04 0.00 - 0.10 K/UL    Immature Granulocytes Absolute 0.02 0.00 - 0.04 K/UL    Differential Type AUTOMATED     Comprehensive Metabolic Panel    Collection Time: 04/18/25  4:24 AM   Result Value Ref Range    Sodium 137 136 - 145 mmol/L    Potassium 3.9 3.5 - 5.5 mmol/L    Chloride 105 100 - 111 mmol/L    CO2 25 21 - 32 mmol/L    Anion Gap 7 3.0 - 18 mmol/L    Glucose 88 74 - 99 mg/dL    BUN 19 (H) 7.0 - 18 MG/DL    Creatinine 0.77 0.6 - 1.3 MG/DL    BUN/Creatinine Ratio 25 (H) 12 - 20      Est, Glom Filt Rate 82 >60 ml/min/1.73m2    Calcium 8.9 8.5 - 10.1 MG/DL    Total Bilirubin 0.4 0.2 - 1.0 MG/DL    ALT 15 13 - 56 U/L    AST 13 10 - 38 U/L    Alk Phosphatase 44 (L) 45 - 117 U/L    Total Protein 5.6 (L) 6.4 - 8.2 g/dL    Albumin 2.5 (L) 3.4 - 5.0 g/dL    Globulin 3.1 2.0 - 4.0 g/dL    Albumin/Globulin

## 2025-04-18 NOTE — PLAN OF CARE
Problem: Discharge Planning  Goal: Discharge to home or other facility with appropriate resources  Outcome: Progressing  Flowsheets (Taken 4/18/2025 1230)  Discharge to home or other facility with appropriate resources: Identify discharge learning needs (meds, wound care, etc)  Note: Nurse will identify discharge medications learning needs and educate patient     Problem: Pain  Goal: Verbalizes/displays adequate comfort level or baseline comfort level  4/18/2025 1230 by Miguel Isabel GN  Outcome: Progressing  Flowsheets (Taken 4/18/2025 1230)  Verbalizes/displays adequate comfort level or baseline comfort level: Administer analgesics based on type and severity of pain and evaluate response  Note: Nurse will administer pain medications as ordered and evaluate response within 30 mins for IV and 1 hour for oral meds     Problem: Safety - Adult  Goal: Free from fall injury  4/18/2025 1230 by Miguel Isabel GN  Outcome: Progressing  Flowsheets (Taken 4/18/2025 1230)  Free From Fall Injury: Instruct family/caregiver on patient safety  Note: Bed in low position, call light and bedside table within reach     Problem: ABCDS Injury Assessment  Goal: Absence of physical injury  Outcome: Progressing  Flowsheets (Taken 4/18/2025 1230)  Absence of Physical Injury: Implement safety measures based on patient assessment  Note: Nurse will implement fall precautions. Nurse will assist patient to the bathroom every 2 hours or as needed

## 2025-04-19 LAB
ALBUMIN SERPL-MCNC: 2.2 G/DL (ref 3.4–5)
ALBUMIN/GLOB SERPL: 0.7 (ref 0.8–1.7)
ALP SERPL-CCNC: 40 U/L (ref 45–117)
ALT SERPL-CCNC: 13 U/L (ref 13–56)
ANION GAP SERPL CALC-SCNC: 4 MMOL/L (ref 3–18)
AST SERPL-CCNC: 14 U/L (ref 10–38)
BASOPHILS # BLD: 0.01 K/UL (ref 0–0.1)
BASOPHILS NFR BLD: 0.1 % (ref 0–2)
BILIRUB SERPL-MCNC: 0.3 MG/DL (ref 0.2–1)
BUN SERPL-MCNC: 20 MG/DL (ref 7–18)
BUN/CREAT SERPL: 30 (ref 12–20)
CALCIUM SERPL-MCNC: 8.8 MG/DL (ref 8.5–10.1)
CHLORIDE SERPL-SCNC: 106 MMOL/L (ref 100–111)
CO2 SERPL-SCNC: 29 MMOL/L (ref 21–32)
CREAT SERPL-MCNC: 0.66 MG/DL (ref 0.6–1.3)
DIFFERENTIAL METHOD BLD: ABNORMAL
EKG ATRIAL RATE: 74 BPM
EKG DIAGNOSIS: NORMAL
EKG P AXIS: 82 DEGREES
EKG P-R INTERVAL: 134 MS
EKG Q-T INTERVAL: 390 MS
EKG QRS DURATION: 82 MS
EKG QTC CALCULATION (BAZETT): 432 MS
EKG R AXIS: 31 DEGREES
EKG T AXIS: -24 DEGREES
EKG VENTRICULAR RATE: 74 BPM
EOSINOPHIL # BLD: 0 K/UL (ref 0–0.4)
EOSINOPHIL NFR BLD: 0 % (ref 0–5)
ERYTHROCYTE [DISTWIDTH] IN BLOOD BY AUTOMATED COUNT: 14.1 % (ref 11.6–14.5)
GLOBULIN SER CALC-MCNC: 3.1 G/DL (ref 2–4)
GLUCOSE SERPL-MCNC: 132 MG/DL (ref 74–99)
HCT VFR BLD AUTO: 34.7 % (ref 35–45)
HGB BLD-MCNC: 10.6 G/DL (ref 12–16)
IMM GRANULOCYTES # BLD AUTO: 0.02 K/UL (ref 0–0.04)
IMM GRANULOCYTES NFR BLD AUTO: 0.2 % (ref 0–0.5)
LYMPHOCYTES # BLD: 0.32 K/UL (ref 0.9–3.6)
LYMPHOCYTES NFR BLD: 3.5 % (ref 21–52)
MCH RBC QN AUTO: 28.4 PG (ref 24–34)
MCHC RBC AUTO-ENTMCNC: 30.5 G/DL (ref 31–37)
MCV RBC AUTO: 93 FL (ref 78–100)
MONOCYTES # BLD: 0.68 K/UL (ref 0.05–1.2)
MONOCYTES NFR BLD: 7.4 % (ref 3–10)
NEUTS SEG # BLD: 8.22 K/UL (ref 1.8–8)
NEUTS SEG NFR BLD: 88.8 % (ref 40–73)
NRBC # BLD: 0 K/UL (ref 0–0.01)
NRBC BLD-RTO: 0 PER 100 WBC
PLATELET # BLD AUTO: 353 K/UL (ref 135–420)
PMV BLD AUTO: 10.3 FL (ref 9.2–11.8)
POTASSIUM SERPL-SCNC: 4.2 MMOL/L (ref 3.5–5.5)
PROT SERPL-MCNC: 5.3 G/DL (ref 6.4–8.2)
RBC # BLD AUTO: 3.73 M/UL (ref 4.2–5.3)
SODIUM SERPL-SCNC: 139 MMOL/L (ref 136–145)
WBC # BLD AUTO: 9.3 K/UL (ref 4.6–13.2)

## 2025-04-19 PROCEDURE — 93010 ELECTROCARDIOGRAM REPORT: CPT | Performed by: INTERNAL MEDICINE

## 2025-04-19 PROCEDURE — 2580000003 HC RX 258: Performed by: INTERNAL MEDICINE

## 2025-04-19 PROCEDURE — 80053 COMPREHEN METABOLIC PANEL: CPT

## 2025-04-19 PROCEDURE — 51702 INSERT TEMP BLADDER CATH: CPT

## 2025-04-19 PROCEDURE — 36415 COLL VENOUS BLD VENIPUNCTURE: CPT

## 2025-04-19 PROCEDURE — 6370000000 HC RX 637 (ALT 250 FOR IP): Performed by: INTERNAL MEDICINE

## 2025-04-19 PROCEDURE — 85025 COMPLETE CBC W/AUTO DIFF WBC: CPT

## 2025-04-19 PROCEDURE — 99232 SBSQ HOSP IP/OBS MODERATE 35: CPT | Performed by: STUDENT IN AN ORGANIZED HEALTH CARE EDUCATION/TRAINING PROGRAM

## 2025-04-19 PROCEDURE — 1100000000 HC RM PRIVATE

## 2025-04-19 PROCEDURE — 2500000003 HC RX 250 WO HCPCS: Performed by: INTERNAL MEDICINE

## 2025-04-19 PROCEDURE — 6360000002 HC RX W HCPCS: Performed by: INTERNAL MEDICINE

## 2025-04-19 PROCEDURE — 6360000002 HC RX W HCPCS: Performed by: SURGERY

## 2025-04-19 RX ORDER — KETOROLAC TROMETHAMINE 15 MG/ML
15 INJECTION, SOLUTION INTRAMUSCULAR; INTRAVENOUS 4 TIMES DAILY
Status: DISCONTINUED | OUTPATIENT
Start: 2025-04-19 | End: 2025-04-22 | Stop reason: HOSPADM

## 2025-04-19 RX ORDER — KETOROLAC TROMETHAMINE 15 MG/ML
15 INJECTION, SOLUTION INTRAMUSCULAR; INTRAVENOUS ONCE
Status: COMPLETED | OUTPATIENT
Start: 2025-04-19 | End: 2025-04-19

## 2025-04-19 RX ADMIN — ONDANSETRON 4 MG: 2 INJECTION, SOLUTION INTRAMUSCULAR; INTRAVENOUS at 00:44

## 2025-04-19 RX ADMIN — KETOROLAC TROMETHAMINE 15 MG: 15 INJECTION, SOLUTION INTRAMUSCULAR; INTRAVENOUS at 17:57

## 2025-04-19 RX ADMIN — SODIUM CHLORIDE, SODIUM LACTATE, POTASSIUM CHLORIDE, AND CALCIUM CHLORIDE: 600; 310; 30; 20 INJECTION, SOLUTION INTRAVENOUS at 04:11

## 2025-04-19 RX ADMIN — HYDROMORPHONE HYDROCHLORIDE 0.5 MG: 1 INJECTION, SOLUTION INTRAMUSCULAR; INTRAVENOUS; SUBCUTANEOUS at 22:21

## 2025-04-19 RX ADMIN — ASPIRIN 81 MG: 81 TABLET, COATED ORAL at 09:42

## 2025-04-19 RX ADMIN — ROPINIROLE HYDROCHLORIDE 0.5 MG: 0.25 TABLET, FILM COATED ORAL at 20:29

## 2025-04-19 RX ADMIN — SODIUM CHLORIDE, SODIUM LACTATE, POTASSIUM CHLORIDE, AND CALCIUM CHLORIDE: 600; 310; 30; 20 INJECTION, SOLUTION INTRAVENOUS at 14:19

## 2025-04-19 RX ADMIN — HYDROMORPHONE HYDROCHLORIDE 0.5 MG: 1 INJECTION, SOLUTION INTRAMUSCULAR; INTRAVENOUS; SUBCUTANEOUS at 04:11

## 2025-04-19 RX ADMIN — KETOROLAC TROMETHAMINE 15 MG: 15 INJECTION, SOLUTION INTRAMUSCULAR; INTRAVENOUS at 20:29

## 2025-04-19 RX ADMIN — KETOROLAC TROMETHAMINE 15 MG: 15 INJECTION, SOLUTION INTRAMUSCULAR; INTRAVENOUS at 11:12

## 2025-04-19 RX ADMIN — EZETIMIBE 10 MG: 10 TABLET ORAL at 09:34

## 2025-04-19 RX ADMIN — LEVOTHYROXINE SODIUM 50 MCG: 0.05 TABLET ORAL at 09:30

## 2025-04-19 RX ADMIN — ENOXAPARIN SODIUM 40 MG: 100 INJECTION SUBCUTANEOUS at 09:29

## 2025-04-19 RX ADMIN — KETOROLAC TROMETHAMINE 15 MG: 15 INJECTION, SOLUTION INTRAMUSCULAR; INTRAVENOUS at 14:32

## 2025-04-19 RX ADMIN — SODIUM CHLORIDE, PRESERVATIVE FREE 10 ML: 5 INJECTION INTRAVENOUS at 20:29

## 2025-04-19 RX ADMIN — MORPHINE SULFATE 2 MG: 2 INJECTION, SOLUTION INTRAMUSCULAR; INTRAVENOUS at 00:44

## 2025-04-19 RX ADMIN — AMLODIPINE BESYLATE 5 MG: 5 TABLET ORAL at 09:35

## 2025-04-19 ASSESSMENT — PAIN SCALES - GENERAL
PAINLEVEL_OUTOF10: 0
PAINLEVEL_OUTOF10: 0
PAINLEVEL_OUTOF10: 7
PAINLEVEL_OUTOF10: 0
PAINLEVEL_OUTOF10: 2
PAINLEVEL_OUTOF10: 0
PAINLEVEL_OUTOF10: 6
PAINLEVEL_OUTOF10: 5
PAINLEVEL_OUTOF10: 4
PAINLEVEL_OUTOF10: 7
PAINLEVEL_OUTOF10: 3
PAINLEVEL_OUTOF10: 0
PAINLEVEL_OUTOF10: 8
PAINLEVEL_OUTOF10: 1
PAINLEVEL_OUTOF10: 0

## 2025-04-19 ASSESSMENT — PAIN DESCRIPTION - ORIENTATION
ORIENTATION: RIGHT;LEFT;ANTERIOR;LOWER;UPPER
ORIENTATION: RIGHT;LEFT;LOWER;MID;ANTERIOR
ORIENTATION: RIGHT;LEFT;LOWER;MID;ANTERIOR
ORIENTATION: MID
ORIENTATION: RIGHT;LEFT;ANTERIOR;LOWER
ORIENTATION: MID
ORIENTATION: MID

## 2025-04-19 ASSESSMENT — PAIN DESCRIPTION - DESCRIPTORS
DESCRIPTORS: ACHING
DESCRIPTORS: ACHING
DESCRIPTORS: THROBBING
DESCRIPTORS: ACHING

## 2025-04-19 ASSESSMENT — PAIN DESCRIPTION - FREQUENCY
FREQUENCY: INTERMITTENT

## 2025-04-19 ASSESSMENT — PAIN - FUNCTIONAL ASSESSMENT

## 2025-04-19 ASSESSMENT — PAIN DESCRIPTION - ONSET
ONSET: GRADUAL

## 2025-04-19 ASSESSMENT — PAIN DESCRIPTION - PAIN TYPE
TYPE: SURGICAL PAIN
TYPE: ACUTE PAIN
TYPE: SURGICAL PAIN

## 2025-04-19 ASSESSMENT — PAIN DESCRIPTION - LOCATION
LOCATION: ABDOMEN

## 2025-04-19 NOTE — ANESTHESIA POSTPROCEDURE EVALUATION
Department of Anesthesiology  Postprocedure Note    Patient: Yodit Yoo  MRN: [id number]  YOB: 1950  Date of evaluation: 4/18/2025    Procedure Summary       Date: 04/18/25 Room / Location: [facility] MAIN 01 / [facility] MAIN OR    Anesthesia Start: 1558 Anesthesia Stop: 1739    Procedure: DIAGNOSTIC LAPAROSCOPY, LAPAROTOMY, SMALL BOWEL RESECTION (Abdomen) Diagnosis:       Small bowel obstruction (HCC)      (Small bowel obstruction (HCC) [K56.609])    Surgeons: Tasha Martinez DO Responsible Provider: Girma Gore Jr., MD    Anesthesia Type: General ASA Status: 3 - Emergent            Anesthesia Type: General    Agatha Phase I: Agatha Score: 8    Agatha Phase II:      Anesthesia Post Evaluation    Patient location during evaluation: bedside  Airway patency: patent  Cardiovascular status: hemodynamically stable  Respiratory status: acceptable  Hydration status: stable  Pain management: adequate    No notable events documented.

## 2025-04-19 NOTE — PLAN OF CARE
Problem: Discharge Planning  Goal: Discharge to home or other facility with appropriate resources  4/18/2025 2109 by Zoraida Tucker RN  Outcome: Progressing  Flowsheets (Taken 4/18/2025 2109)  Discharge to home or other facility with appropriate resources: Identify barriers to discharge with patient and caregiver  4/18/2025 1230 by Miguel Isabel GN  Outcome: Progressing  Flowsheets (Taken 4/18/2025 1230)  Discharge to home or other facility with appropriate resources: Identify discharge learning needs (meds, wound care, etc)  Note: Nurse will identify discharge medications learning needs and educate patient     Problem: Pain  Goal: Verbalizes/displays adequate comfort level or baseline comfort level  4/18/2025 2109 by Zoraida Tucker RN  Outcome: Progressing  Flowsheets (Taken 4/18/2025 2109)  Verbalizes/displays adequate comfort level or baseline comfort level:   Encourage patient to monitor pain and request assistance   Assess pain using appropriate pain scale   Implement non-pharmacological measures as appropriate and evaluate response   Consider cultural and social influences on pain and pain management  4/18/2025 1230 by Miguel Isabel GN  Outcome: Progressing  Flowsheets (Taken 4/18/2025 1230)  Verbalizes/displays adequate comfort level or baseline comfort level: Administer analgesics based on type and severity of pain and evaluate response  Note: Nurse will administer pain medications as ordered and evaluate response within 30 mins for IV and 1 hour for oral meds     Problem: Safety - Adult  Goal: Free from fall injury  4/18/2025 2109 by Zoraida Tucker RN  Outcome: Progressing  Flowsheets (Taken 4/18/2025 2109)  Free From Fall Injury: Instruct family/caregiver on patient safety  4/18/2025 1230 by Miguel Isabel GN  Outcome: Progressing  Flowsheets (Taken 4/18/2025 1230)  Free From Fall Injury: Instruct family/caregiver on patient safety  Note: Bed in low position, call light and bedside table within

## 2025-04-19 NOTE — PLAN OF CARE
Problem: Discharge Planning  Goal: Discharge to home or other facility with appropriate resources  Outcome: Progressing  Flowsheets (Taken 4/19/2025 1522)  Discharge to home or other facility with appropriate resources: Identify barriers to discharge with patient and caregiver  Note: Identify discharge barriers and update the case management     Problem: Pain  Goal: Verbalizes/displays adequate comfort level or baseline comfort level  Outcome: Progressing  Flowsheets (Taken 4/19/2025 1522)  Verbalizes/displays adequate comfort level or baseline comfort level: Administer analgesics based on type and severity of pain and evaluate response  Note: Administer pain medication and evaluate response      Problem: Safety - Adult  Goal: Free from fall injury  Outcome: Progressing  Flowsheets (Taken 4/19/2025 1522)  Free From Fall Injury: Instruct family/caregiver on patient safety  Note: Bed in low position, call light and bedside table within reach     Problem: ABCDS Injury Assessment  Goal: Absence of physical injury  Outcome: Progressing  Flowsheets (Taken 4/19/2025 1522)  Absence of Physical Injury: Implement safety measures based on patient assessment  Note: Assist patient to the bathroom every 2 hours or as needed     Problem: Neurosensory - Adult  Goal: Achieves maximal functionality and self care  Outcome: Progressing  Flowsheets (Taken 4/19/2025 1522)  Achieves maximal functionality and self care: Encourage and assist patient to increase activity and self care with guidance from physical therapy/occupational therapy     Problem: Skin/Tissue Integrity - Adult  Goal: Incisions, wounds, or drain sites healing without S/S of infection  Outcome: Progressing  Flowsheets (Taken 4/19/2025 1522)  Incisions, Wounds, or Drain Sites Healing Without Sign and Symptoms of Infection: TWICE DAILY: Assess and document skin integrity  Note: Document skin integrity twice daily     Problem: Musculoskeletal - Adult  Goal: Return

## 2025-04-20 LAB
ALBUMIN SERPL-MCNC: 2 G/DL (ref 3.4–5)
ALBUMIN/GLOB SERPL: 0.6 (ref 0.8–1.7)
ALP SERPL-CCNC: 45 U/L (ref 45–117)
ALT SERPL-CCNC: 12 U/L (ref 13–56)
ANION GAP SERPL CALC-SCNC: 6 MMOL/L (ref 3–18)
AST SERPL-CCNC: 21 U/L (ref 10–38)
BASOPHILS # BLD: 0.02 K/UL (ref 0–0.1)
BASOPHILS NFR BLD: 0.2 % (ref 0–2)
BILIRUB SERPL-MCNC: 0.5 MG/DL (ref 0.2–1)
BUN SERPL-MCNC: 20 MG/DL (ref 7–18)
BUN/CREAT SERPL: 42 (ref 12–20)
CALCIUM SERPL-MCNC: 8.9 MG/DL (ref 8.5–10.1)
CHLORIDE SERPL-SCNC: 107 MMOL/L (ref 100–111)
CO2 SERPL-SCNC: 27 MMOL/L (ref 21–32)
CREAT SERPL-MCNC: 0.48 MG/DL (ref 0.6–1.3)
DIFFERENTIAL METHOD BLD: ABNORMAL
EOSINOPHIL # BLD: 0 K/UL (ref 0–0.4)
EOSINOPHIL NFR BLD: 0 % (ref 0–5)
ERYTHROCYTE [DISTWIDTH] IN BLOOD BY AUTOMATED COUNT: 14.1 % (ref 11.6–14.5)
GLOBULIN SER CALC-MCNC: 3.2 G/DL (ref 2–4)
GLUCOSE SERPL-MCNC: 84 MG/DL (ref 74–99)
HCT VFR BLD AUTO: 30.1 % (ref 35–45)
HGB BLD-MCNC: 9.2 G/DL (ref 12–16)
IMM GRANULOCYTES # BLD AUTO: 0.07 K/UL (ref 0–0.04)
IMM GRANULOCYTES NFR BLD AUTO: 0.7 % (ref 0–0.5)
LYMPHOCYTES # BLD: 0.61 K/UL (ref 0.9–3.6)
LYMPHOCYTES NFR BLD: 5.7 % (ref 21–52)
MCH RBC QN AUTO: 28.4 PG (ref 24–34)
MCHC RBC AUTO-ENTMCNC: 30.6 G/DL (ref 31–37)
MCV RBC AUTO: 92.9 FL (ref 78–100)
MONOCYTES # BLD: 0.93 K/UL (ref 0.05–1.2)
MONOCYTES NFR BLD: 8.7 % (ref 3–10)
NEUTS SEG # BLD: 9.06 K/UL (ref 1.8–8)
NEUTS SEG NFR BLD: 84.7 % (ref 40–73)
NRBC # BLD: 0 K/UL (ref 0–0.01)
NRBC BLD-RTO: 0 PER 100 WBC
PLATELET # BLD AUTO: 301 K/UL (ref 135–420)
PMV BLD AUTO: 10 FL (ref 9.2–11.8)
POTASSIUM SERPL-SCNC: 3.8 MMOL/L (ref 3.5–5.5)
PROT SERPL-MCNC: 5.2 G/DL (ref 6.4–8.2)
RBC # BLD AUTO: 3.24 M/UL (ref 4.2–5.3)
SODIUM SERPL-SCNC: 140 MMOL/L (ref 136–145)
WBC # BLD AUTO: 10.7 K/UL (ref 4.6–13.2)

## 2025-04-20 PROCEDURE — 80053 COMPREHEN METABOLIC PANEL: CPT

## 2025-04-20 PROCEDURE — 94761 N-INVAS EAR/PLS OXIMETRY MLT: CPT

## 2025-04-20 PROCEDURE — 6370000000 HC RX 637 (ALT 250 FOR IP): Performed by: INTERNAL MEDICINE

## 2025-04-20 PROCEDURE — 2580000003 HC RX 258: Performed by: INTERNAL MEDICINE

## 2025-04-20 PROCEDURE — 99232 SBSQ HOSP IP/OBS MODERATE 35: CPT | Performed by: STUDENT IN AN ORGANIZED HEALTH CARE EDUCATION/TRAINING PROGRAM

## 2025-04-20 PROCEDURE — 6370000000 HC RX 637 (ALT 250 FOR IP): Performed by: SURGERY

## 2025-04-20 PROCEDURE — 2500000003 HC RX 250 WO HCPCS: Performed by: INTERNAL MEDICINE

## 2025-04-20 PROCEDURE — 6360000002 HC RX W HCPCS: Performed by: INTERNAL MEDICINE

## 2025-04-20 PROCEDURE — 36415 COLL VENOUS BLD VENIPUNCTURE: CPT

## 2025-04-20 PROCEDURE — 1100000000 HC RM PRIVATE

## 2025-04-20 PROCEDURE — 6360000002 HC RX W HCPCS: Performed by: SURGERY

## 2025-04-20 PROCEDURE — 85025 COMPLETE CBC W/AUTO DIFF WBC: CPT

## 2025-04-20 RX ORDER — HYDROCODONE BITARTRATE AND ACETAMINOPHEN 5; 325 MG/1; MG/1
1 TABLET ORAL EVERY 6 HOURS PRN
Refills: 0 | Status: DISCONTINUED | OUTPATIENT
Start: 2025-04-20 | End: 2025-04-22 | Stop reason: HOSPADM

## 2025-04-20 RX ORDER — DOCUSATE SODIUM 100 MG/1
100 CAPSULE, LIQUID FILLED ORAL 2 TIMES DAILY
Status: DISCONTINUED | OUTPATIENT
Start: 2025-04-20 | End: 2025-04-22 | Stop reason: HOSPADM

## 2025-04-20 RX ADMIN — SODIUM CHLORIDE, PRESERVATIVE FREE 10 ML: 5 INJECTION INTRAVENOUS at 09:00

## 2025-04-20 RX ADMIN — EZETIMIBE 10 MG: 10 TABLET ORAL at 08:58

## 2025-04-20 RX ADMIN — SODIUM CHLORIDE, PRESERVATIVE FREE 10 ML: 5 INJECTION INTRAVENOUS at 20:36

## 2025-04-20 RX ADMIN — DOCUSATE SODIUM 100 MG: 100 CAPSULE, LIQUID FILLED ORAL at 08:58

## 2025-04-20 RX ADMIN — LEVOTHYROXINE SODIUM 50 MCG: 0.05 TABLET ORAL at 08:58

## 2025-04-20 RX ADMIN — ROPINIROLE HYDROCHLORIDE 0.5 MG: 0.25 TABLET, FILM COATED ORAL at 20:35

## 2025-04-20 RX ADMIN — PREGABALIN 50 MG: 50 CAPSULE ORAL at 08:58

## 2025-04-20 RX ADMIN — DOCUSATE SODIUM 100 MG: 100 CAPSULE, LIQUID FILLED ORAL at 20:36

## 2025-04-20 RX ADMIN — KETOROLAC TROMETHAMINE 15 MG: 15 INJECTION, SOLUTION INTRAMUSCULAR; INTRAVENOUS at 20:35

## 2025-04-20 RX ADMIN — HYDROCODONE BITARTRATE AND ACETAMINOPHEN 1 TABLET: 5; 325 TABLET ORAL at 23:45

## 2025-04-20 RX ADMIN — SODIUM CHLORIDE, SODIUM LACTATE, POTASSIUM CHLORIDE, AND CALCIUM CHLORIDE: 600; 310; 30; 20 INJECTION, SOLUTION INTRAVENOUS at 00:01

## 2025-04-20 RX ADMIN — MORPHINE SULFATE 2 MG: 2 INJECTION, SOLUTION INTRAMUSCULAR; INTRAVENOUS at 03:49

## 2025-04-20 RX ADMIN — AMLODIPINE BESYLATE 5 MG: 5 TABLET ORAL at 08:58

## 2025-04-20 RX ADMIN — KETOROLAC TROMETHAMINE 15 MG: 15 INJECTION, SOLUTION INTRAMUSCULAR; INTRAVENOUS at 17:49

## 2025-04-20 RX ADMIN — ENOXAPARIN SODIUM 40 MG: 100 INJECTION SUBCUTANEOUS at 08:58

## 2025-04-20 RX ADMIN — KETOROLAC TROMETHAMINE 15 MG: 15 INJECTION, SOLUTION INTRAMUSCULAR; INTRAVENOUS at 12:49

## 2025-04-20 RX ADMIN — KETOROLAC TROMETHAMINE 15 MG: 15 INJECTION, SOLUTION INTRAMUSCULAR; INTRAVENOUS at 08:58

## 2025-04-20 ASSESSMENT — PAIN DESCRIPTION - ORIENTATION
ORIENTATION: ANTERIOR
ORIENTATION: ANTERIOR
ORIENTATION: MID

## 2025-04-20 ASSESSMENT — PAIN DESCRIPTION - PAIN TYPE
TYPE: SURGICAL PAIN
TYPE: SURGICAL PAIN

## 2025-04-20 ASSESSMENT — PAIN - FUNCTIONAL ASSESSMENT
PAIN_FUNCTIONAL_ASSESSMENT: ACTIVITIES ARE NOT PREVENTED

## 2025-04-20 ASSESSMENT — PAIN DESCRIPTION - LOCATION
LOCATION: ABDOMEN
LOCATION: ABDOMEN

## 2025-04-20 ASSESSMENT — PAIN SCALES - GENERAL
PAINLEVEL_OUTOF10: 7
PAINLEVEL_OUTOF10: 0
PAINLEVEL_OUTOF10: 6

## 2025-04-20 ASSESSMENT — PAIN DESCRIPTION - DESCRIPTORS
DESCRIPTORS: ACHING;SHARP
DESCRIPTORS: ACHING

## 2025-04-20 ASSESSMENT — PAIN DESCRIPTION - FREQUENCY
FREQUENCY: INTERMITTENT
FREQUENCY: INTERMITTENT

## 2025-04-20 ASSESSMENT — PAIN DESCRIPTION - ONSET
ONSET: GRADUAL
ONSET: GRADUAL

## 2025-04-20 NOTE — PLAN OF CARE
Problem: Discharge Planning  Goal: Discharge to home or other facility with appropriate resources  4/19/2025 2312 by Zoraida Tucker RN  Outcome: Progressing  Flowsheets (Taken 4/19/2025 2312)  Discharge to home or other facility with appropriate resources: Identify barriers to discharge with patient and caregiver  4/19/2025 1531 by Miguel Isabel GN  Outcome: Progressing  Flowsheets (Taken 4/19/2025 1522)  Discharge to home or other facility with appropriate resources: Identify barriers to discharge with patient and caregiver  Note: Identify discharge barriers and update the case management     Problem: Pain  Goal: Verbalizes/displays adequate comfort level or baseline comfort level  4/19/2025 2312 by Zoraida Tucker RN  Outcome: Progressing  Flowsheets (Taken 4/19/2025 2312)  Verbalizes/displays adequate comfort level or baseline comfort level:   Encourage patient to monitor pain and request assistance   Assess pain using appropriate pain scale   Administer analgesics based on type and severity of pain and evaluate response   Implement non-pharmacological measures as appropriate and evaluate response   Consider cultural and social influences on pain and pain management  4/19/2025 1531 by Miguel Isabel GN  Outcome: Progressing  Flowsheets (Taken 4/19/2025 1522)  Verbalizes/displays adequate comfort level or baseline comfort level: Administer analgesics based on type and severity of pain and evaluate response  Note: Administer pain medication and evaluate response      Problem: Safety - Adult  Goal: Free from fall injury  4/19/2025 2312 by Zoraida Tucker RN  Outcome: Progressing  Flowsheets (Taken 4/19/2025 2312)  Free From Fall Injury: Instruct family/caregiver on patient safety  4/19/2025 1531 by Miguel Isabel GN  Outcome: Progressing  Flowsheets (Taken 4/19/2025 1522)  Free From Fall Injury: Instruct family/caregiver on patient safety  Note: Bed in low position, call light and bedside table within reach

## 2025-04-21 LAB
ALBUMIN SERPL-MCNC: 2 G/DL (ref 3.4–5)
ALBUMIN/GLOB SERPL: 0.6 (ref 0.8–1.7)
ALP SERPL-CCNC: 51 U/L (ref 45–117)
ALT SERPL-CCNC: 14 U/L (ref 13–56)
ANION GAP SERPL CALC-SCNC: 3 MMOL/L (ref 3–18)
AST SERPL-CCNC: 21 U/L (ref 10–38)
BASOPHILS # BLD: 0.04 K/UL (ref 0–0.1)
BASOPHILS NFR BLD: 0.4 % (ref 0–2)
BILIRUB SERPL-MCNC: 0.3 MG/DL (ref 0.2–1)
BUN SERPL-MCNC: 14 MG/DL (ref 7–18)
BUN/CREAT SERPL: 29 (ref 12–20)
CALCIUM SERPL-MCNC: 9.1 MG/DL (ref 8.5–10.1)
CHLORIDE SERPL-SCNC: 104 MMOL/L (ref 100–111)
CO2 SERPL-SCNC: 30 MMOL/L (ref 21–32)
CREAT SERPL-MCNC: 0.48 MG/DL (ref 0.6–1.3)
DIFFERENTIAL METHOD BLD: ABNORMAL
EOSINOPHIL # BLD: 0.03 K/UL (ref 0–0.4)
EOSINOPHIL NFR BLD: 0.3 % (ref 0–5)
ERYTHROCYTE [DISTWIDTH] IN BLOOD BY AUTOMATED COUNT: 13.9 % (ref 11.6–14.5)
GLOBULIN SER CALC-MCNC: 3.4 G/DL (ref 2–4)
GLUCOSE SERPL-MCNC: 89 MG/DL (ref 74–99)
HCT VFR BLD AUTO: 29.1 % (ref 35–45)
HGB BLD-MCNC: 8.8 G/DL (ref 12–16)
IMM GRANULOCYTES # BLD AUTO: 0.03 K/UL (ref 0–0.04)
IMM GRANULOCYTES NFR BLD AUTO: 0.3 % (ref 0–0.5)
LYMPHOCYTES # BLD: 0.87 K/UL (ref 0.9–3.6)
LYMPHOCYTES NFR BLD: 9 % (ref 21–52)
MCH RBC QN AUTO: 28.3 PG (ref 24–34)
MCHC RBC AUTO-ENTMCNC: 30.2 G/DL (ref 31–37)
MCV RBC AUTO: 93.6 FL (ref 78–100)
MONOCYTES # BLD: 0.86 K/UL (ref 0.05–1.2)
MONOCYTES NFR BLD: 8.9 % (ref 3–10)
NEUTS SEG # BLD: 7.83 K/UL (ref 1.8–8)
NEUTS SEG NFR BLD: 81.1 % (ref 40–73)
NRBC # BLD: 0 K/UL (ref 0–0.01)
NRBC BLD-RTO: 0 PER 100 WBC
PLATELET # BLD AUTO: 304 K/UL (ref 135–420)
PMV BLD AUTO: 10.4 FL (ref 9.2–11.8)
POTASSIUM SERPL-SCNC: 3.6 MMOL/L (ref 3.5–5.5)
PROT SERPL-MCNC: 5.4 G/DL (ref 6.4–8.2)
RBC # BLD AUTO: 3.11 M/UL (ref 4.2–5.3)
SODIUM SERPL-SCNC: 137 MMOL/L (ref 136–145)
WBC # BLD AUTO: 9.7 K/UL (ref 4.6–13.2)

## 2025-04-21 PROCEDURE — 80053 COMPREHEN METABOLIC PANEL: CPT

## 2025-04-21 PROCEDURE — 99232 SBSQ HOSP IP/OBS MODERATE 35: CPT | Performed by: STUDENT IN AN ORGANIZED HEALTH CARE EDUCATION/TRAINING PROGRAM

## 2025-04-21 PROCEDURE — 6370000000 HC RX 637 (ALT 250 FOR IP): Performed by: SURGERY

## 2025-04-21 PROCEDURE — 85025 COMPLETE CBC W/AUTO DIFF WBC: CPT

## 2025-04-21 PROCEDURE — 99024 POSTOP FOLLOW-UP VISIT: CPT

## 2025-04-21 PROCEDURE — 6370000000 HC RX 637 (ALT 250 FOR IP): Performed by: INTERNAL MEDICINE

## 2025-04-21 PROCEDURE — 6360000002 HC RX W HCPCS: Performed by: SURGERY

## 2025-04-21 PROCEDURE — 6360000002 HC RX W HCPCS: Performed by: INTERNAL MEDICINE

## 2025-04-21 PROCEDURE — 36415 COLL VENOUS BLD VENIPUNCTURE: CPT

## 2025-04-21 PROCEDURE — 94761 N-INVAS EAR/PLS OXIMETRY MLT: CPT

## 2025-04-21 PROCEDURE — 2500000003 HC RX 250 WO HCPCS: Performed by: INTERNAL MEDICINE

## 2025-04-21 PROCEDURE — 1100000000 HC RM PRIVATE

## 2025-04-21 RX ADMIN — DOCUSATE SODIUM 100 MG: 100 CAPSULE, LIQUID FILLED ORAL at 08:47

## 2025-04-21 RX ADMIN — KETOROLAC TROMETHAMINE 15 MG: 15 INJECTION, SOLUTION INTRAMUSCULAR; INTRAVENOUS at 20:04

## 2025-04-21 RX ADMIN — KETOROLAC TROMETHAMINE 15 MG: 15 INJECTION, SOLUTION INTRAMUSCULAR; INTRAVENOUS at 08:47

## 2025-04-21 RX ADMIN — ROPINIROLE HYDROCHLORIDE 0.5 MG: 0.25 TABLET, FILM COATED ORAL at 20:03

## 2025-04-21 RX ADMIN — DOCUSATE SODIUM 100 MG: 100 CAPSULE, LIQUID FILLED ORAL at 20:08

## 2025-04-21 RX ADMIN — LEVOTHYROXINE SODIUM 50 MCG: 0.05 TABLET ORAL at 08:47

## 2025-04-21 RX ADMIN — HYDROMORPHONE HYDROCHLORIDE 0.5 MG: 1 INJECTION, SOLUTION INTRAMUSCULAR; INTRAVENOUS; SUBCUTANEOUS at 04:24

## 2025-04-21 RX ADMIN — EZETIMIBE 10 MG: 10 TABLET ORAL at 08:47

## 2025-04-21 RX ADMIN — SODIUM CHLORIDE, PRESERVATIVE FREE 10 ML: 5 INJECTION INTRAVENOUS at 20:08

## 2025-04-21 RX ADMIN — KETOROLAC TROMETHAMINE 15 MG: 15 INJECTION, SOLUTION INTRAMUSCULAR; INTRAVENOUS at 13:59

## 2025-04-21 RX ADMIN — ENOXAPARIN SODIUM 40 MG: 100 INJECTION SUBCUTANEOUS at 08:47

## 2025-04-21 RX ADMIN — ASPIRIN 81 MG: 81 TABLET, COATED ORAL at 08:49

## 2025-04-21 RX ADMIN — KETOROLAC TROMETHAMINE 15 MG: 15 INJECTION, SOLUTION INTRAMUSCULAR; INTRAVENOUS at 16:55

## 2025-04-21 RX ADMIN — SODIUM CHLORIDE, PRESERVATIVE FREE 10 ML: 5 INJECTION INTRAVENOUS at 08:47

## 2025-04-21 RX ADMIN — AMLODIPINE BESYLATE 5 MG: 5 TABLET ORAL at 08:47

## 2025-04-21 ASSESSMENT — PAIN - FUNCTIONAL ASSESSMENT
PAIN_FUNCTIONAL_ASSESSMENT: ACTIVITIES ARE NOT PREVENTED

## 2025-04-21 ASSESSMENT — PAIN DESCRIPTION - ONSET
ONSET: ON-GOING
ONSET: GRADUAL

## 2025-04-21 ASSESSMENT — PAIN SCALES - GENERAL
PAINLEVEL_OUTOF10: 2
PAINLEVEL_OUTOF10: 7
PAINLEVEL_OUTOF10: 0
PAINLEVEL_OUTOF10: 0
PAINLEVEL_OUTOF10: 4
PAINLEVEL_OUTOF10: 0

## 2025-04-21 ASSESSMENT — ENCOUNTER SYMPTOMS
COLOR CHANGE: 0
VOMITING: 0
SHORTNESS OF BREATH: 0
NAUSEA: 0
ABDOMINAL PAIN: 0

## 2025-04-21 ASSESSMENT — PAIN DESCRIPTION - ORIENTATION
ORIENTATION: MID
ORIENTATION: MID
ORIENTATION: INNER;MID

## 2025-04-21 ASSESSMENT — PAIN DESCRIPTION - DESCRIPTORS
DESCRIPTORS: ACHING

## 2025-04-21 ASSESSMENT — PAIN DESCRIPTION - PAIN TYPE
TYPE: SURGICAL PAIN
TYPE: SURGICAL PAIN

## 2025-04-21 ASSESSMENT — PAIN DESCRIPTION - FREQUENCY
FREQUENCY: INTERMITTENT
FREQUENCY: INTERMITTENT

## 2025-04-21 ASSESSMENT — PAIN DESCRIPTION - LOCATION
LOCATION: ABDOMEN

## 2025-04-21 NOTE — PLAN OF CARE
Problem: Discharge Planning  Goal: Discharge to home or other facility with appropriate resources  Outcome: Progressing  Flowsheets (Taken 4/20/2025 2043)  Discharge to home or other facility with appropriate resources: Identify barriers to discharge with patient and caregiver     Problem: Pain  Goal: Verbalizes/displays adequate comfort level or baseline comfort level  Outcome: Progressing  Flowsheets (Taken 4/20/2025 2043)  Verbalizes/displays adequate comfort level or baseline comfort level:   Encourage patient to monitor pain and request assistance   Assess pain using appropriate pain scale   Administer analgesics based on type and severity of pain and evaluate response   Implement non-pharmacological measures as appropriate and evaluate response   Consider cultural and social influences on pain and pain management     Problem: Safety - Adult  Goal: Free from fall injury  Outcome: Progressing  Flowsheets (Taken 4/20/2025 2043)  Free From Fall Injury: Instruct family/caregiver on patient safety     Problem: ABCDS Injury Assessment  Goal: Absence of physical injury  Outcome: Progressing  Flowsheets (Taken 4/20/2025 2043)  Absence of Physical Injury: Implement safety measures based on patient assessment     Problem: Neurosensory - Adult  Goal: Achieves maximal functionality and self care  Outcome: Progressing  Flowsheets (Taken 4/20/2025 2043)  Achieves maximal functionality and self care: Encourage and assist patient to increase activity and self care with guidance from physical therapy/occupational therapy     Problem: ABCDS Injury Assessment  Goal: Absence of physical injury  Outcome: Progressing  Flowsheets (Taken 4/20/2025 2043)  Absence of Physical Injury: Implement safety measures based on patient assessment     Problem: Skin/Tissue Integrity - Adult  Goal: Incisions, wounds, or drain sites healing without S/S of infection  Outcome: Progressing  Flowsheets (Taken 4/20/2025 2043)  Incisions, Wounds, or

## 2025-04-21 NOTE — PLAN OF CARE
Problem: Discharge Planning  Goal: Discharge to home or other facility with appropriate resources  Outcome: Progressing  Flowsheets (Taken 4/20/2025 2043 by Zoraida Tucker, RN)  Discharge to home or other facility with appropriate resources: Identify barriers to discharge with patient and caregiver  Note: NAD . Progressing towards discharge      Problem: Pain  Goal: Verbalizes/displays adequate comfort level or baseline comfort level  Outcome: Progressing  Flowsheets (Taken 4/20/2025 2043 by Zoraida Tucker, RN)  Verbalizes/displays adequate comfort level or baseline comfort level:   Encourage patient to monitor pain and request assistance   Assess pain using appropriate pain scale   Administer analgesics based on type and severity of pain and evaluate response   Implement non-pharmacological measures as appropriate and evaluate response   Consider cultural and social influences on pain and pain management  Note: Pt to verbalize pain and pain is treated appropriately      Problem: Safety - Adult  Goal: Free from fall injury  Outcome: Progressing  Flowsheets (Taken 4/20/2025 2043 by Zoraida Tucker RN)  Free From Fall Injury: Instruct family/caregiver on patient safety  Note: All appropriate fall interventions in place      Problem: ABCDS Injury Assessment  Goal: Absence of physical injury  Outcome: Progressing  Flowsheets (Taken 4/20/2025 2043 by Zoraida Tucker, RN)  Absence of Physical Injury: Implement safety measures based on patient assessment  Note: All appropriate fall interventions in place      Problem: Neurosensory - Adult  Goal: Achieves maximal functionality and self care  Outcome: Progressing     Problem: Skin/Tissue Integrity - Adult  Goal: Incisions, wounds, or drain sites healing without S/S of infection  Outcome: Progressing  Flowsheets (Taken 4/20/2025 2043 by Zoraida Tucker, LATOSHA)  Incisions, Wounds, or Drain Sites Healing Without Sign and Symptoms of Infection:   TWICE DAILY: Assess and document

## 2025-04-22 VITALS
DIASTOLIC BLOOD PRESSURE: 79 MMHG | WEIGHT: 134.99 LBS | BODY MASS INDEX: 23.92 KG/M2 | SYSTOLIC BLOOD PRESSURE: 162 MMHG | HEIGHT: 63 IN | OXYGEN SATURATION: 100 % | TEMPERATURE: 98.8 F | RESPIRATION RATE: 18 BRPM | HEART RATE: 80 BPM

## 2025-04-22 PROBLEM — I70.1 RENAL ARTERY STENOSIS: Status: ACTIVE | Noted: 2025-04-22

## 2025-04-22 LAB
ALBUMIN SERPL-MCNC: 1.9 G/DL (ref 3.4–5)
ALBUMIN/GLOB SERPL: 0.5 (ref 0.8–1.7)
ALP SERPL-CCNC: 55 U/L (ref 45–117)
ALT SERPL-CCNC: 17 U/L (ref 13–56)
ANION GAP SERPL CALC-SCNC: 6 MMOL/L (ref 3–18)
AST SERPL-CCNC: 26 U/L (ref 10–38)
BASOPHILS # BLD: 0.04 K/UL (ref 0–0.1)
BASOPHILS NFR BLD: 0.6 % (ref 0–2)
BILIRUB SERPL-MCNC: 0.4 MG/DL (ref 0.2–1)
BUN SERPL-MCNC: 10 MG/DL (ref 7–18)
BUN/CREAT SERPL: 19 (ref 12–20)
CALCIUM SERPL-MCNC: 9.3 MG/DL (ref 8.5–10.1)
CHLORIDE SERPL-SCNC: 103 MMOL/L (ref 100–111)
CO2 SERPL-SCNC: 31 MMOL/L (ref 21–32)
CREAT SERPL-MCNC: 0.53 MG/DL (ref 0.6–1.3)
DIFFERENTIAL METHOD BLD: ABNORMAL
EOSINOPHIL # BLD: 0.17 K/UL (ref 0–0.4)
EOSINOPHIL NFR BLD: 2.6 % (ref 0–5)
ERYTHROCYTE [DISTWIDTH] IN BLOOD BY AUTOMATED COUNT: 13.6 % (ref 11.6–14.5)
GLOBULIN SER CALC-MCNC: 3.8 G/DL (ref 2–4)
GLUCOSE SERPL-MCNC: 97 MG/DL (ref 74–99)
HCT VFR BLD AUTO: 29.9 % (ref 35–45)
HGB BLD-MCNC: 9.3 G/DL (ref 12–16)
IMM GRANULOCYTES # BLD AUTO: 0.02 K/UL (ref 0–0.04)
IMM GRANULOCYTES NFR BLD AUTO: 0.3 % (ref 0–0.5)
LYMPHOCYTES # BLD: 0.6 K/UL (ref 0.9–3.6)
LYMPHOCYTES NFR BLD: 9.3 % (ref 21–52)
MCH RBC QN AUTO: 28.3 PG (ref 24–34)
MCHC RBC AUTO-ENTMCNC: 31.1 G/DL (ref 31–37)
MCV RBC AUTO: 90.9 FL (ref 78–100)
MONOCYTES # BLD: 0.6 K/UL (ref 0.05–1.2)
MONOCYTES NFR BLD: 9.3 % (ref 3–10)
NEUTS SEG # BLD: 5.01 K/UL (ref 1.8–8)
NEUTS SEG NFR BLD: 77.9 % (ref 40–73)
NRBC # BLD: 0 K/UL (ref 0–0.01)
NRBC BLD-RTO: 0 PER 100 WBC
PLATELET # BLD AUTO: 351 K/UL (ref 135–420)
PMV BLD AUTO: 10.2 FL (ref 9.2–11.8)
POTASSIUM SERPL-SCNC: 3.7 MMOL/L (ref 3.5–5.5)
PROT SERPL-MCNC: 5.7 G/DL (ref 6.4–8.2)
RBC # BLD AUTO: 3.29 M/UL (ref 4.2–5.3)
SODIUM SERPL-SCNC: 140 MMOL/L (ref 136–145)
WBC # BLD AUTO: 6.4 K/UL (ref 4.6–13.2)

## 2025-04-22 PROCEDURE — 99024 POSTOP FOLLOW-UP VISIT: CPT

## 2025-04-22 PROCEDURE — 6370000000 HC RX 637 (ALT 250 FOR IP): Performed by: SURGERY

## 2025-04-22 PROCEDURE — 6360000002 HC RX W HCPCS: Performed by: FAMILY MEDICINE

## 2025-04-22 PROCEDURE — 6360000002 HC RX W HCPCS: Performed by: INTERNAL MEDICINE

## 2025-04-22 PROCEDURE — 36415 COLL VENOUS BLD VENIPUNCTURE: CPT

## 2025-04-22 PROCEDURE — 85025 COMPLETE CBC W/AUTO DIFF WBC: CPT

## 2025-04-22 PROCEDURE — 99239 HOSP IP/OBS DSCHRG MGMT >30: CPT | Performed by: INTERNAL MEDICINE

## 2025-04-22 PROCEDURE — 94761 N-INVAS EAR/PLS OXIMETRY MLT: CPT

## 2025-04-22 PROCEDURE — 2500000003 HC RX 250 WO HCPCS: Performed by: INTERNAL MEDICINE

## 2025-04-22 PROCEDURE — 80053 COMPREHEN METABOLIC PANEL: CPT

## 2025-04-22 PROCEDURE — 6370000000 HC RX 637 (ALT 250 FOR IP): Performed by: INTERNAL MEDICINE

## 2025-04-22 RX ORDER — PSEUDOEPHEDRINE HCL 30 MG
100 TABLET ORAL 2 TIMES DAILY
Qty: 60 CAPSULE | Refills: 2 | Status: SHIPPED | OUTPATIENT
Start: 2025-04-22

## 2025-04-22 RX ORDER — HYDROCODONE BITARTRATE AND ACETAMINOPHEN 5; 325 MG/1; MG/1
1 TABLET ORAL EVERY 4 HOURS PRN
Qty: 18 TABLET | Refills: 0 | Status: SHIPPED | OUTPATIENT
Start: 2025-04-22 | End: 2025-04-25

## 2025-04-22 RX ORDER — HYDRALAZINE HYDROCHLORIDE 20 MG/ML
10 INJECTION INTRAMUSCULAR; INTRAVENOUS EVERY 6 HOURS PRN
Status: DISCONTINUED | OUTPATIENT
Start: 2025-04-22 | End: 2025-04-22 | Stop reason: HOSPADM

## 2025-04-22 RX ADMIN — LEVOTHYROXINE SODIUM 50 MCG: 0.05 TABLET ORAL at 08:25

## 2025-04-22 RX ADMIN — AMLODIPINE BESYLATE 5 MG: 5 TABLET ORAL at 08:25

## 2025-04-22 RX ADMIN — DOCUSATE SODIUM 100 MG: 100 CAPSULE, LIQUID FILLED ORAL at 08:25

## 2025-04-22 RX ADMIN — ENOXAPARIN SODIUM 40 MG: 100 INJECTION SUBCUTANEOUS at 08:25

## 2025-04-22 RX ADMIN — EZETIMIBE 10 MG: 10 TABLET ORAL at 08:25

## 2025-04-22 RX ADMIN — HYDROCODONE BITARTRATE AND ACETAMINOPHEN 1 TABLET: 5; 325 TABLET ORAL at 12:34

## 2025-04-22 RX ADMIN — HYDRALAZINE HYDROCHLORIDE 10 MG: 20 INJECTION INTRAMUSCULAR; INTRAVENOUS at 04:50

## 2025-04-22 RX ADMIN — SODIUM CHLORIDE, PRESERVATIVE FREE 10 ML: 5 INJECTION INTRAVENOUS at 08:25

## 2025-04-22 ASSESSMENT — ENCOUNTER SYMPTOMS
ABDOMINAL PAIN: 0
VOMITING: 0
CONSTIPATION: 0
NAUSEA: 0
SHORTNESS OF BREATH: 0
COLOR CHANGE: 0
ABDOMINAL DISTENTION: 0

## 2025-04-22 ASSESSMENT — PAIN DESCRIPTION - FREQUENCY: FREQUENCY: INTERMITTENT

## 2025-04-22 ASSESSMENT — PAIN DESCRIPTION - ORIENTATION: ORIENTATION: RIGHT;MID

## 2025-04-22 ASSESSMENT — PAIN DESCRIPTION - ONSET: ONSET: GRADUAL

## 2025-04-22 ASSESSMENT — PAIN SCALES - GENERAL
PAINLEVEL_OUTOF10: 2
PAINLEVEL_OUTOF10: 5
PAINLEVEL_OUTOF10: 0
PAINLEVEL_OUTOF10: 0

## 2025-04-22 ASSESSMENT — PAIN DESCRIPTION - PAIN TYPE: TYPE: SURGICAL PAIN

## 2025-04-22 ASSESSMENT — PAIN - FUNCTIONAL ASSESSMENT: PAIN_FUNCTIONAL_ASSESSMENT: ACTIVITIES ARE NOT PREVENTED

## 2025-04-22 ASSESSMENT — PAIN DESCRIPTION - LOCATION: LOCATION: ABDOMEN

## 2025-04-22 ASSESSMENT — PAIN DESCRIPTION - DESCRIPTORS: DESCRIPTORS: ACHING

## 2025-04-22 NOTE — DISCHARGE SUMMARY
Discharge Summary    Patient: Yodit Yoo MRN: [id number]  CSN: [id number]    YOB: 1950  Age: 74 y.o.  Sex: female    DOA: 4/17/2025 LOS:  LOS: 5 days   Discharge Date:      Admission Diagnosis: Small bowel obstruction (HCC) [K56.609]  SBO (small bowel obstruction) (HCC) [K56.609]    Discharge Diagnosis:    Small bowel obstruction  Essential hypertension  Restless leg syndrome  Hypothyroidism  Hyperlipidemia  Normocytic  Stable pancreatic tail lesion, likely cystic lesion per MRI/MRCP in September 2022    Discharge Condition: Stable    PHYSICAL EXAM  Visit Vitals  BP (!) 154/62   Pulse 93   Temp 99.3 °F (37.4 °C) (Oral)   Resp 18   Ht 1.6 m (5' 2.99\")   Wt 61.2 kg (134 lb 15.8 oz)   SpO2 97%   BMI 23.92 kg/m²       General: Alert, cooperative, no acute distress    HEENT: NC, Atraumatic.  PERRLA, EOMI. Anicteric sclerae.  Lungs:  CTA Bilaterally. No Wheezing/Rhonchi/Rales.  Heart:  Regular  rhythm,  No murmur, No Rubs, No Gallops  Abdomen: Soft, Non distended, Non tender.  +Bowel sounds, no HSM  Extremities: No c/c/e  Psych:   Good insight. Not anxious or agitated.  Neurologic:  CN 2-12 grossly intact, oriented X 3.  No acute neurological                                 Deficits,     Hospital Course: Per the H&P:  Yodit Yoo is a 74 y.o.  female who. Patient reports her abdominal pain started 3 days ago. She has had bowel obstructions before.She had surgery for a SBO in 2020 at [facility] in [place], [place]. She had had difficulty keeping food and medicine and liquids down. Has had multiple episodes of nausea and vomiting at home.  Currently lives at home with her  in [place], [place]. 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     Once the patient arrived to [facility] the patient was afebrile, with respiratory rate of 16, heart rate of 67 and a blood pressure 137/69 the patient was 98% on room air.  Labs were notable for glucose of 107, a

## 2025-04-22 NOTE — PROGRESS NOTES
OCCUPATIONAL THERAPY EVALUATION/DISCHARGE    Patient: Yodit Yoo (74 y.o. female)  Date: 4/18/2025  Primary Diagnosis: Small bowel obstruction (HCC) [K56.609]  SBO (small bowel obstruction) (HCC) [K56.609]  Procedure(s) (LRB):  EXPLORATORY LAPAROTOMY; RELEASE OF SMALL BOWEL OBSTRUCTION (N/A)     Precautions: General Precautions  PLOF: Pt lives with spouse and son in [place], [place] and was independent with self-care and functional mobility.      ASSESSMENT AND RECOMMENDATIONS:  Based on the objective data described below, pt presents with no deficits that impede pt function with ADLs, functional transfers, and functional mobility in preparation for selfcare tasks. Patient independent for bed mobility in preparation for ADLs, independent for donning BLE shoes and independent doffing back gown standing with good balance. Patient modified independent for sit <> stand and supervision for functional mobility d/t IV pole management. At this time pt is safe to d/c home with family support from selfcare standpoint. Pt left all needs met with PT present in room.       Maximum therapeutic gains met at current level of care and patient will be discharged from occupational therapy at this time.    Further Equipment Recommendations for Discharge: None    AMPA: AM-PAC Inpatient Daily Activity Raw Score: 24    At this time and based on an AM-PAC score, no further OT is recommended upon discharge.  Recommend patient returns to prior setting with prior services.    This [facility] score should be considered in conjunction with interdisciplinary team recommendations to determine the most appropriate discharge setting. Patient's social support, diagnosis, medical stability, and prior level of function should also be taken into consideration.     SUBJECTIVE:   Patient stated “I wanted to move around”    OBJECTIVE DATA SUMMARY:     Past Medical History:   Diagnosis Date    Abdominal pain     Allergies     Alpha-gal syndrome     
  [facility] Hospitalist Group  Progress Note    Patient: Yodit Yoo Age: 74 y.o. : 1950 MR#: [id number] SSN: [id number]  Date: 2025                 DVT Prophylaxis:  [x]Lovenox  []Hep SQ  []SCDs  []Coumadin   []On Heparin gtt []PO anticoagulant    Anticipated discharge: 2025 to home, after surgery    Subjective:     The patient is new to me today.  She was seen and examined at the bedside in follow-up for small bowel obstruction.  Her  was also present.  Patient reported abdominal pain which she described as diffuse.  The pain is adequately controlled with the current analgesic regimen.  Patient has been able to pass flatus but has not had a bowel movement in 2 days.  She denied any nausea or vomiting today.      Objective:   VS: BP (!) 147/61   Pulse 71   Temp 98.6 °F (37 °C) (Oral)   Resp 18   Ht 1.6 m (5' 2.99\")   Wt 61.2 kg (134 lb 15.8 oz)   SpO2 96%   BMI 23.92 kg/m²    Tmax/24hrs: Temp (24hrs), Av.7 °F (37.1 °C), Min:98.6 °F (37 °C), Max:99.1 °F (37.3 °C)    Intake/Output Summary (Last 24 hours) at 2025 1414  Last data filed at 2025 2141  Gross per 24 hour   Intake 10 ml   Output --   Net 10 ml        PHYSICAL EXAM  General Appearance: No acute distress  HENT: normocephalic/atraumatic, moist mucus membranes  Neck: No JVD, supple  Lungs: CTA with normal respiratory effort  CV: RRR,   Abdomen: soft, diffusely tender but nondistended; bowel sounds diminished  : no suprapubic tenderness   Extremities: Moves extremities spontaneously  Neuro: Alert and oriented  Skin: Warm and dry  Psych: appropriate mood and affect    Current Facility-Administered Medications   Medication Dose Route Frequency    sodium chloride flush 0.9 % injection 5-40 mL  5-40 mL IntraVENous 2 times per day    sodium chloride flush 0.9 % injection 5-40 mL  5-40 mL IntraVENous PRN    0.9 % sodium chloride infusion   IntraVENous PRN    potassium chloride 10 
  [facility] Hospitalist Group  Progress Note    Patient: Yodit Yoo Age: 74 y.o. : 1950 MR#: [id number] SSN: [id number]  Date: 2025                 DVT Prophylaxis:  [x]Lovenox  []Hep SQ  []SCDs  []Coumadin   []On Heparin gtt []PO anticoagulant    Anticipated discharge: 2025 to home, pending clinical course and after the patient has been cleared by surgery    Subjective:     Patient was seen and examined at the bedside.  She reported feeling better today.  Her NG tube has been removed.  She has been able to pass flatus.  She is on a clear liquid diet but she said that her options are limited because of her gluten intolerance.      Objective:   VS: BP (!) 141/70   Pulse 88   Temp 98.6 °F (37 °C) (Oral)   Resp 17   Ht 1.6 m (5' 2.99\")   Wt 61.2 kg (134 lb 15.8 oz)   SpO2 95%   BMI 23.92 kg/m²    Tmax/24hrs: Temp (24hrs), Av.6 °F (37 °C), Min:98.4 °F (36.9 °C), Max:99.3 °F (37.4 °C)    Intake/Output Summary (Last 24 hours) at 2025 1336  Last data filed at 2025 0754  Gross per 24 hour   Intake 2593.76 ml   Output 1005 ml   Net 1588.76 ml        PHYSICAL EXAM  General Appearance: No acute distress  HENT: normocephalic/atraumatic, moist mucus membranes  Neck: No JVD, supple  Lungs: CTA with normal respiratory effort  CV: RRR,   Abdomen: soft, diffusely tender and slightly distended; bowel sounds diminished;   : no suprapubic tenderness   Extremities: Moves extremities spontaneously  Neuro: Alert and oriented  Skin: Warm and dry  Psych: appropriate mood and affect    Current Facility-Administered Medications   Medication Dose Route Frequency    HYDROcodone-acetaminophen (NORCO) 5-325 MG per tablet 1 tablet  1 tablet Oral Q6H PRN    docusate sodium (COLACE) capsule 100 mg  100 mg Oral BID    ketorolac (TORADOL) injection 15 mg  15 mg IntraVENous 4x Daily    Phenol-Glycerin (CHLORASEPTIC MAX) spray 1 spray  1 spray Oral PRN    sodium chloride flush 
4 Eyes Skin Assessment     NAME:  Yodit Yoo  YOB: 1950  MEDICAL RECORD NUMBER:  [id number]    The patient is being assessed for  Admission    I agree that at least one RN has performed a thorough Head to Toe Skin Assessment on the patient. ALL assessment sites listed below have been assessed.      Areas assessed by both nurses:    Head, Face, Ears, Shoulders, Back, Chest, Arms, Elbows, Hands, Sacrum. Buttock, Coccyx, Ischium, and Legs. Feet and Heels        Does the Patient have a Wound? No noted wound(s)       Vinayak Prevention initiated by RN: No  Wound Care Orders initiated by RN: No    Pressure Injury (Stage 3,4, Unstageable, DTI, NWPT, and Complex wounds) if present, place Wound referral order by RN under : No    New Ostomies, if present place, Ostomy referral order under : No     Nurse 1 eSignature: Electronically signed by Madiha Funez RN on 4/18/25 at 12:44 AM EDT    **SHARE this note so that the co-signing nurse can place an eSignature**    Nurse 2 eSignature: {Esignature:[id number]}   
4 Eyes Skin Assessment     NAME:  Yodit Yoo  YOB: 1950  MEDICAL RECORD NUMBER:  [id number]    The patient is being assessed for  Shift Handoff    I agree that at least one RN has performed a thorough Head to Toe Skin Assessment on the patient. ALL assessment sites listed below have been assessed.      Areas assessed by both nurses:    Head, Face, Ears, Shoulders, Back, Chest, Arms, Elbows, Hands, Sacrum. Buttock, Coccyx, Ischium, and Legs. Feet and Heels        Does the Patient have a Wound? No noted wound(s)       Vinayak Prevention initiated by RN: Yes  Wound Care Orders initiated by RN: No    Pressure Injury (Stage 3,4, Unstageable, DTI, NWPT, and Complex wounds) if present, place Wound referral order by RN under : No    New Ostomies, if present place, Ostomy referral order under : No     Nurse 1 eSignature: Electronically signed by TRINIDAD Cotton on 4/18/25 at 8:04 PM EDT    **SHARE this note so that the co-signing nurse can place an eSignature**    Nurse 2 eSignature: {Esignature:[id number]}   
4 Eyes Skin Assessment     NAME:  Yodit Yoo  YOB: 1950  MEDICAL RECORD NUMBER:  [id number]    The patient is being assessed for  Shift Handoff    I agree that at least one RN has performed a thorough Head to Toe Skin Assessment on the patient. ALL assessment sites listed below have been assessed.      Areas assessed by both nurses:    Head, Face, Ears, Shoulders, Back, Chest, Arms, Elbows, Hands, Sacrum. Buttock, Coccyx, Ischium, and Legs. Feet and Heels        Does the Patient have a Wound? No noted wound(s)       Vinayak Prevention initiated by RN: Yes  Wound Care Orders initiated by RN: No    Pressure Injury (Stage 3,4, Unstageable, DTI, NWPT, and Complex wounds) if present, place Wound referral order by RN under : No    New Ostomies, if present place, Ostomy referral order under : No     Nurse 1 eSignature: Electronically signed by TRINIDAD Cotton on 4/19/25 at 7:36 PM EDT    **SHARE this note so that the co-signing nurse can place an eSignature**    Nurse 2 eSignature: {Esignature:[id number]}   
Advance Care Planning   Healthcare Decision Maker:    Primary Decision Maker: Sergei Yoo - Spouse - [phone]    Secondary Decision Maker: ShepherdOfelia erwin - Child - [phone]    Today we documented Decision Maker(s) consistent with Legal Next of Kin hierarchy.     Spiritual Health History and Assessment/Progress Note  [facility]    (P) Spiritual/Emotional Needs, Advance Care Planning,  ,  ,      Name: Yodit Yoo MRN: [id number]    Age: 74 y.o.     Sex: female   Language: English   Christianity: Mu-ism   Small bowel obstruction (HCC)     Date: 4/18/2025            Total Time Calculated: (P) 7 min              Spiritual Assessment began in [facility] SURGICAL        Referral/Consult From: (P) Multi-disciplinary team   Encounter Overview/Reason: (P) Spiritual/Emotional Needs, Advance Care Planning  Service Provided For: (P) Patient    Simona, Belief, Meaning:   Patient is connected with a simona tradition or spiritual practice and has beliefs or practices that help with coping during difficult times  Family/Friends No family/friends present      Importance and Influence:  Patient has spiritual/personal beliefs that influence decisions regarding their health  Family/Friends No family/friends present    Community:  Patient feels well-supported. Support system includes: Spouse/Partner and Children  Family/Friends No family/friends present    Assessment and Plan of Care:     Patient Interventions include: Facilitated expression of thoughts and feelings  Family/Friends Interventions include: No family/friends present    Patient Plan of Care: No spiritual needs identified for follow-up  Family/Friends Plan of Care: No spiritual needs identified for follow-up    Electronically signed by BENITO Ascencio on 4/18/2025 at 12:15 PM        
New PT order received following surgical procedure for small bowel resection.  Patient confirms she remains independent with functional mobility. Skilled PT not indicated and will sign off. . Thank you.     Judy Lima PT,   
POD# 1    Admit Date: 4/17/2025    Assessment    Yodit Yoo is a 74 y.o. female POD 1 s/p laparotomy, small bowel resection     Patient Active Problem List   Diagnosis    Cataract    CTS (carpal tunnel syndrome)    Insomnia    Cervical spondylosis without myelopathy    Essential hypertension    Hyperlipidemia    Hypothyroidism    Restless leg syndrome    High cholesterol    Thyroid disease    Allergy to alpha-gal    Celiac disease    Positive antinuclear antibody    Acquired iron deficiency anemia due to decreased absorption    Normocytic anemia    SBO (small bowel obstruction) ([facility])       Plan  -NPO, NGT today  -scheduled toradol for pain control  -d/c tavares  -out of bed today    Subjective    Overnight events: No acute events overnight. Pain is controlled, no nausea or vomiting    Review of Systems   Respiratory:  Negative for chest tightness and shortness of breath.    Cardiovascular:  Negative for chest pain and palpitations.   Gastrointestinal:  Positive for abdominal pain.       Objective    Physical Exam:  BP (!) 146/65   Pulse 77   Temp 97.9 °F (36.6 °C) (Oral)   Resp 24   Ht 1.6 m (5' 2.99\")   Wt 61.2 kg (134 lb 15.8 oz)   SpO2 95%   BMI 23.92 kg/m²     Intake/Output Summary (Last 24 hours) at 4/19/2025 0859  Last data filed at 4/19/2025 0734  Gross per 24 hour   Intake 3070.74 ml   Output 495 ml   Net 2575.74 ml     Physical Exam  Constitutional:       Appearance: Normal appearance.   HENT:      Head: Normocephalic.   Cardiovascular:      Rate and Rhythm: Normal rate.   Pulmonary:      Effort: Pulmonary effort is normal.   Abdominal:      Palpations: Abdomen is soft.      Tenderness: There is abdominal tenderness.      Comments: Appropriately tender, incision clean, dry, intact   Neurological:      General: No focal deficit present.      Mental Status: She is alert and oriented to person, place, and time. Mental status is at baseline.   Psychiatric:         Mood and Affect: Mood normal.         
POD# 2    Admit Date: 4/17/2025    Assessment    Yodit Yoo is a 74 y.o. female POD 2 s/p laparotomy, small bowel resection     Patient Active Problem List   Diagnosis    Cataract    CTS (carpal tunnel syndrome)    Insomnia    Cervical spondylosis without myelopathy    Essential hypertension    Hyperlipidemia    Hypothyroidism    Restless leg syndrome    High cholesterol    Thyroid disease    Allergy to alpha-gal    Celiac disease    Positive antinuclear antibody    Acquired iron deficiency anemia due to decreased absorption    Normocytic anemia    SBO (small bowel obstruction) ([facility])       Plan  -d/c NGT  -start clears  -oral pain medication ordered  -anticipate if she continues to do well discharge in next 48 hours    Subjective    Overnight events: Passing flatus, pain is improved, minimal output from NGT    Review of Systems   Constitutional:  Negative for fatigue and fever.   Respiratory:  Negative for chest tightness and shortness of breath.    Cardiovascular:  Negative for chest pain and palpitations.   Gastrointestinal:  Positive for abdominal pain.       Objective    Physical Exam:  BP (!) 157/67   Pulse 89   Temp 98.4 °F (36.9 °C) (Oral)   Resp 18   Ht 1.6 m (5' 2.99\")   Wt 61.2 kg (134 lb 15.8 oz)   SpO2 93%   BMI 23.92 kg/m²     Intake/Output Summary (Last 24 hours) at 4/20/2025 0823  Last data filed at 4/20/2025 0754  Gross per 24 hour   Intake 2593.76 ml   Output 1330 ml   Net 1263.76 ml     Physical Exam  Cardiovascular:      Rate and Rhythm: Normal rate.   Pulmonary:      Effort: Pulmonary effort is normal.   Abdominal:      General: Abdomen is flat.      Palpations: Abdomen is soft.      Comments: Incision clean, dry, intact   Psychiatric:         Mood and Affect: Mood normal.         Behavior: Behavior normal.         Thought Content: Thought content normal.         Judgment: Judgment normal.               Tasha Martinez DO  Phone: [phone]  
POD# 3    Admit Date: 4/17/2025    Assessment    Yodit Yoo is a 74 y.o. female who is status post laparotomy, small bowel resection performed by Dr. Martinez on 4/18/2025    Patient Active Problem List   Diagnosis    Cataract    CTS (carpal tunnel syndrome)    Insomnia    Cervical spondylosis without myelopathy    Essential hypertension    Hyperlipidemia    Hypothyroidism    Restless leg syndrome    High cholesterol    Thyroid disease    Allergy to alpha-gal    Celiac disease    Positive antinuclear antibody    Acquired iron deficiency anemia due to decreased absorption    Normocytic anemia    SBO (small bowel obstruction) ([facility])       Plan  -Okay to advance diet as tolerated.  I put in for a full liquid diet per patient's request.  If she tolerates this well she may advance to soft, easy to digest regular diet.   -Out of bed, encourage ambulation  -Anticipate discharge within the next 24 hours if she continues to do well    Subjective    Overnight events: No acute overnight events tolerating clear liquid diet. Denies abdominal pain, nausea or vomiting.  Passing flatus, no bowel movement.     Review of Systems   Constitutional:  Negative for chills and fever.   Respiratory:  Negative for shortness of breath.    Cardiovascular:  Negative for chest pain.   Gastrointestinal:  Negative for abdominal pain, nausea and vomiting.   Skin:  Positive for wound. Negative for color change, pallor and rash.   Hematological:  Negative for adenopathy. Does not bruise/bleed easily.       Objective    Physical Exam:  /67   Pulse 71   Temp 97.9 °F (36.6 °C) (Oral)   Resp 18   Ht 1.6 m (5' 2.99\")   Wt 61.2 kg (134 lb 15.8 oz)   SpO2 94%   BMI 23.92 kg/m²     Intake/Output Summary (Last 24 hours) at 4/21/2025 0652  Last data filed at 4/21/2025 0424  Gross per 24 hour   Intake 2898.63 ml   Output 1100 ml   Net 1798.63 ml     Physical Exam  Constitutional:       Appearance: Normal appearance. She is normal weight. 
POD# 4    Admit Date: 4/17/2025    Assessment    Yodit Yoo is a 74 y.o. female who is status post laparotomy, small bowel resection performed by Dr. Martinez on 4/18/2025     Patient Active Problem List   Diagnosis    Cataract    CTS (carpal tunnel syndrome)    Insomnia    Cervical spondylosis without myelopathy    Essential hypertension    Hyperlipidemia    Hypothyroidism    Restless leg syndrome    High cholesterol    Thyroid disease    Allergy to alpha-gal    Celiac disease    Positive antinuclear antibody    Acquired iron deficiency anemia due to decreased absorption    Normocytic anemia    SBO (small bowel obstruction) ([facility])       Plan  - Advance to soft, easy to digest regular diet as tolerated.  No beef, no chicken, no fish, no dairy, no gluten.  - Out of bed.  Encourage ambulation  - Cleared for discharge from a general surgery standpoint with outpatient follow-up at our office in 2 weeks    Subjective    Overnight events: No acute overnight events    Review of Systems   Constitutional:  Negative for chills and fever.   Respiratory:  Negative for shortness of breath.    Cardiovascular:  Negative for chest pain.   Gastrointestinal:  Negative for abdominal distention, abdominal pain, constipation, nausea and vomiting.   Skin:  Positive for wound. Negative for color change, pallor and rash.   Hematological:  Negative for adenopathy. Does not bruise/bleed easily.       Objective    Physical Exam:  BP (!) 161/86   Pulse 74   Temp 98.8 °F (37.1 °C) (Oral)   Resp 18   Ht 1.6 m (5' 2.99\")   Wt 61.2 kg (134 lb 15.8 oz)   SpO2 97%   BMI 23.92 kg/m²     Intake/Output Summary (Last 24 hours) at 4/22/2025 0643  Last data filed at 4/22/2025 0435  Gross per 24 hour   Intake 240 ml   Output 450 ml   Net -210 ml     Physical Exam  Constitutional:       Appearance: Normal appearance. She is normal weight.   HENT:      Head: Normocephalic and atraumatic.   Eyes:      Extraocular Movements: Extraocular movements 
    Allergies     Alpha-gal syndrome     Arthritis     Bowel obstruction (HCC) 2019    stricture small bowel    Diarrhea     GERD (gastroesophageal reflux disease) 2018    High cholesterol     Hypertension     Hypothyroidism approx. 10 years ago    low TSH    Kidney disease     Nausea & vomiting     Osteoarthritis approx. 20 years ago    Restless legs syndrome approx. 10 years ago    Thyroid disease      Past Surgical History:   Procedure Laterality Date    BREAST SURGERY Right     biopsyr    CAPSULE ENDOSCOPY N/A 02/25/2020    COLONOSCOPY  07/08/2016    COLONOSCOPY  02/07/2020    JOINT REPLACEMENT  2014    Bilat. hip    ORTHOPEDIC SURGERY      both hips replaced, carpal tunnel b/l,     OTHER SURGICAL HISTORY      ovary removed    OTHER SURGICAL HISTORY  01/24/2019    Exploratory Lap    SMALL INTESTINE SURGERY  3/6/20    TUBAL LIGATION  1991    UPPER GASTROINTESTINAL ENDOSCOPY  02/07/2020    UPPER GASTROINTESTINAL ENDOSCOPY N/A 2/14/2024    EGD ESOPHAGOGASTRODUODENOSCOPY W/ BXS performed by Senia Sánchez MD at [facility] ENDOSCOPY       Home Situation:  Social/Functional History  Lives With: Spouse, Son  Type of Home: House  Home Layout: One level  Home Access: Stairs to enter with rails  Entrance Stairs - Number of Steps: 4  Entrance Stairs - Rails: Both  Bathroom Shower/Tub: Tub/Shower unit  Home Equipment: None  Has the patient had two or more falls in the past year or any fall with injury in the past year?: No  Prior Level of Assist for ADLs: Independent  Prior Level of Assist for Ambulation: Independent community ambulator, with or without device  Prior Level of Assist for Transfers: Independent  Critical Behavior:  Orientation  Overall Orientation Status: Within Normal Limits  Orientation Level: Oriented X4  Cognition  Overall Cognitive Status: WNL    Strength:    Strength: Within functional limits    Tone & Sensation:   Tone: Normal  Sensation: Intact    Range Of Motion:  AROM: Within functional limits  PROM: 
nonobstructive bowel gas pattern in the colon. Electronically signed by Mario Pedro    CT ABDOMEN PELVIS W IV CONTRAST Additional Contrast? None  Result Date: 4/17/2025  EXAM: CT ABDOMEN PELVIS W IV CONTRAST INDICATION: History of adhesions and obstructions severe pain periumbilical COMPARISON: CT abdomen pelvis 1/15/2024, abdominal radiograph 3/3/2020 CONTRAST: 100 mL of Isovue-370. ORAL CONTRAST: None TECHNIQUE: Following intravenous administration of contrast, thin axial images were obtained through the abdomen and pelvis. Coronal and sagittal reconstructions were generated. CT dose reduction was achieved through use of a standardized protocol tailored for this examination and automatic exposure control for dose modulation. FINDINGS: LOWER THORAX: Cardiomegaly. Coronary artery calcifications. Aortic valve plane calcifications. Pericardial calcifications. Atherosclerosis. LIVER: Steatosis. No definite mass. BILIARY TREE: Gallbladder is within normal limits. CBD is not dilated. SPLEEN: within normal limits. PANCREAS:. Stable lobulated 11 mm hypodense lesion in the tail (2-30). No ductal dilatation. ADRENALS: Unremarkable. KIDNEYS: No mass, calculus, or hydronephrosis. Atrophic left kidney. Several subcentimeter hypodensities bilaterally are too small to characterize; no dedicated follow-up. STOMACH: Unremarkable. SMALL BOWEL: High-grade distal small bowel obstruction to the level of a transition point in the pelvis.. COLON: No dilatation or wall thickening. APPENDIX: Normal. PERITONEUM: Small volume ascites. No pneumoperitoneum. RETROPERITONEUM: No lymphadenopathy. Atherosclerosis without aneurysm. REPRODUCTIVE ORGANS: Uterus is atrophic versus absent. No suspicious adnexal mass. URINARY BLADDER: No mass or calculus. BONES: Bilateral total hip arthroplasties. No definite acute fracture or aggressive osseous lesion. Degenerative changes.. ABDOMINAL WALL: No mass or hernia. ADDITIONAL COMMENTS: N/A     1.  High-grade 
nonobstructive bowel gas pattern with most of the visualized air in the colon not the small bowel, as noted on CT. There is some excreted contrast in the bladder.  The bones are unremarkable except for bilateral total hip arthroplasties.     Persistent mild dilatation of a single loop of small bowel, as described.. Note that additional loops of fluid-filled dilated small bowel would not be seen. There is a nonobstructive bowel gas pattern in the colon. Electronically signed by Mario Pedro    CT ABDOMEN PELVIS W IV CONTRAST Additional Contrast? None  Result Date: 4/17/2025  EXAM: CT ABDOMEN PELVIS W IV CONTRAST INDICATION: History of adhesions and obstructions severe pain periumbilical COMPARISON: CT abdomen pelvis 1/15/2024, abdominal radiograph 3/3/2020 CONTRAST: 100 mL of Isovue-370. ORAL CONTRAST: None TECHNIQUE: Following intravenous administration of contrast, thin axial images were obtained through the abdomen and pelvis. Coronal and sagittal reconstructions were generated. CT dose reduction was achieved through use of a standardized protocol tailored for this examination and automatic exposure control for dose modulation. FINDINGS: LOWER THORAX: Cardiomegaly. Coronary artery calcifications. Aortic valve plane calcifications. Pericardial calcifications. Atherosclerosis. LIVER: Steatosis. No definite mass. BILIARY TREE: Gallbladder is within normal limits. CBD is not dilated. SPLEEN: within normal limits. PANCREAS:. Stable lobulated 11 mm hypodense lesion in the tail (2-30). No ductal dilatation. ADRENALS: Unremarkable. KIDNEYS: No mass, calculus, or hydronephrosis. Atrophic left kidney. Several subcentimeter hypodensities bilaterally are too small to characterize; no dedicated follow-up. STOMACH: Unremarkable. SMALL BOWEL: High-grade distal small bowel obstruction to the level of a transition point in the pelvis.. COLON: No dilatation or wall thickening. APPENDIX: Normal. PERITONEUM: Small volume ascites. No

## 2025-04-22 NOTE — CARE COORDINATION
04/22/25 1316   IMM Letter   IMM Letter given to Patient/Family/Significant other/Guardian/POA/by: Valeria Chin   IMM Letter date given: 04/22/25   IMM Letter time given: 1306     Patient waived the 4 hour right to appeal the discharge.           Valeria Chin RN Case Manager  [phone]   
CM spoke with patient at the bedside.   No CM needs at this time.   Patient said her  will be transporting patient home today for discharge.             Valeria Chin RN Case Manager  [phone]   
D/C order noted for today. Orders reviewed. No needs identified at this time. Patient's  to transport patient home today for discharge. CM remains available if needed.           Valeria Chin, [phone]   
discharged to: House   Follow Up Appointment: Best Day/Time  Monday AM   One/Two Story Residence One story   History of falls? 0   Services At/After Discharge   Transition of Care Consult (CM Consult) N/A   Services At/After Discharge None   [place] Resource Information Provided? No   Mode of Transport at Discharge Other (see comment)  (Spouse to transport)   Hospital Transport Time of Discharge   (to be determined on day of discharge)   Confirm Follow Up Transport Family   Condition of Participation: Discharge Planning   The Plan for Transition of Care is related to the following treatment goals: Disposition is home with available resources   The Patient and/or Patient Representative was provided with a Choice of Provider? Patient   The Patient and/Or Patient Representative agree with the Discharge Plan? Yes   Freedom of Choice list was provided with basic dialogue that supports the patient's individualized plan of care/goals, treatment preferences, and shares the quality data associated with the providers?  Yes  (Patient declined services)     Spoke to patient at bedside, HIPAA verified. Completed initial assessment and verified demographics.     Makenna SOW, RN   Case Management   [phone]

## 2025-04-22 NOTE — PLAN OF CARE
Problem: Pain  Goal: Verbalizes/displays adequate comfort level or baseline comfort level  4/22/2025 0042 by Dorene Lopez RN  Outcome: Progressing  Flowsheets (Taken 4/20/2025 2043 by Zoraida Tucker RN)  Verbalizes/displays adequate comfort level or baseline comfort level:   Encourage patient to monitor pain and request assistance   Assess pain using appropriate pain scale   Administer analgesics based on type and severity of pain and evaluate response   Implement non-pharmacological measures as appropriate and evaluate response   Consider cultural and social influences on pain and pain management     Problem: Skin/Tissue Integrity - Adult  Goal: Incisions, wounds, or drain sites healing without S/S of infection  4/22/2025 1137 by Darling Fischer RN  Outcome: Progressing  Flowsheets (Taken 4/22/2025 1137)  Incisions, Wounds, or Drain Sites Healing Without Sign and Symptoms of Infection:   TWICE DAILY: Assess and document skin integrity   TWICE DAILY: Assess and document dressing/incision, wound bed, drain sites and surrounding tissue  Note: Monitoring incision for any s/s of any infections   4/22/2025 0042 by Dorene Lopez RN  Outcome: Progressing  Flowsheets (Taken 4/20/2025 2043 by Zoraida Tucker RN)  Incisions, Wounds, or Drain Sites Healing Without Sign and Symptoms of Infection:   TWICE DAILY: Assess and document dressing/incision, wound bed, drain sites and surrounding tissue   Implement wound care per orders     Problem: Gastrointestinal - Adult  Goal: Minimal or absence of nausea and vomiting  Outcome: Progressing  Note: Pt is admitted for Small bowel obstruction. No NG was placed. Currently Hydrating and pain control.  Advancing  diet as tolerated.

## 2025-04-22 NOTE — DISCHARGE INSTRUCTIONS
the hours 9-5 Monday-Friday please call the office at [phone].      *If there is a medical emergency please go to the nearest emergency room immediately

## 2025-04-22 NOTE — PLAN OF CARE
Problem: Discharge Planning  Goal: Discharge to home or other facility with appropriate resources  4/21/2025 1509 by Daria Ponce RN  Outcome: Progressing  Flowsheets (Taken 4/20/2025 2043 by Zoraida Tucker RN)  Discharge to home or other facility with appropriate resources: Identify barriers to discharge with patient and caregiver  Note: NAD . Progressing towards discharge      Problem: Pain  Goal: Verbalizes/displays adequate comfort level or baseline comfort level  4/22/2025 0042 by Dorene Lopez RN  Outcome: Progressing  Flowsheets (Taken 4/20/2025 2043 by Zoraida Tucker RN)  Verbalizes/displays adequate comfort level or baseline comfort level:   Encourage patient to monitor pain and request assistance   Assess pain using appropriate pain scale   Administer analgesics based on type and severity of pain and evaluate response   Implement non-pharmacological measures as appropriate and evaluate response   Consider cultural and social influences on pain and pain management  4/21/2025 1509 by Daria Ponce RN  Outcome: Progressing  Flowsheets (Taken 4/20/2025 2043 by Zoraida Tucker RN)  Verbalizes/displays adequate comfort level or baseline comfort level:   Encourage patient to monitor pain and request assistance   Assess pain using appropriate pain scale   Administer analgesics based on type and severity of pain and evaluate response   Implement non-pharmacological measures as appropriate and evaluate response   Consider cultural and social influences on pain and pain management  Note: Pt to verbalize pain and pain is treated appropriately      Problem: Safety - Adult  Goal: Free from fall injury  4/22/2025 0042 by Dorene Lopez RN  Outcome: Progressing  Flowsheets (Taken 4/20/2025 2043 by Zoraida Tucker RN)  Free From Fall Injury: Instruct family/caregiver on patient safety  4/21/2025 1509 by Daria Ponce RN  Outcome: Progressing  Flowsheets (Taken 4/20/2025 2043 by Zoraida Tucker RN)  Free

## 2025-04-23 ENCOUNTER — CARE COORDINATION (OUTPATIENT)
Facility: CLINIC | Age: 75
End: 2025-04-23

## 2025-04-23 DIAGNOSIS — K56.609 SBO (SMALL BOWEL OBSTRUCTION) (HCC): Primary | ICD-10-CM

## 2025-04-23 PROCEDURE — 1111F DSCHRG MED/CURRENT MED MERGE: CPT | Performed by: FAMILY MEDICINE

## 2025-04-23 NOTE — CARE COORDINATION
Care Transitions Note    Initial Call - Call within 2 business days of discharge: Yes    Patient Current Location:  [place]    Care Transition Nurse contacted the patient by telephone to perform post hospital discharge assessment, verified name and  as identifiers.  Provided introduction to self, and explanation of the Care Transition Nurse role.    Patient: Yodit Yoo    Patient : 1950   MRN: [id number]        Reason for Admission:   See information below, under, Last Discharge Facility      Discharge Date: 25  RURS: Readmission Risk Score: 12.4      Last Discharge Facility       Date Complaint Diagnosis Description Type Department Provider    25  SBO (small bowel obstruction) (HCC) Admission (Discharged) [id number] Keane, DO    25 Abdominal Pain; Nausea Small bowel obstruction (HCC) ... ED (TRANSFER) Luigi Reyes, DO            Was this an external facility discharge? No    Additional needs identified to be addressed with provider   No needs identified             Method of communication with provider: none.    Patients top risk factors for readmission:   Patient status post surgical procedure. Procedure per chart review:   -DIAGNOSTIC LAPAROSCOPY, LAPAROTOMY, SMALL BOWEL RESECTION   - 8 or more current medications     Interventions to address risk factors:   The Initial Care Transitions Outreach was completed  with patient.   Patient was encouraged to review discharge instructions and to please follow up with medical providers for any concerns or questions.   Patient voiced understanding this information.      Care Summary Note:   Patient was discharged from [facility] to home.     Patient reports:   - She is feeling much better.     Care Transition Nurse reviewed  discharge instruction ( follow up appointment), medical action plan and red flags with patient. The patient was given an opportunity to ask questions;  no questions posed at this time . The

## 2025-04-29 ENCOUNTER — OFFICE VISIT (OUTPATIENT)
Facility: CLINIC | Age: 75
End: 2025-04-29

## 2025-04-29 VITALS
HEART RATE: 77 BPM | DIASTOLIC BLOOD PRESSURE: 72 MMHG | RESPIRATION RATE: 18 BRPM | WEIGHT: 127.8 LBS | SYSTOLIC BLOOD PRESSURE: 131 MMHG | HEIGHT: 62 IN | BODY MASS INDEX: 23.52 KG/M2 | TEMPERATURE: 99.2 F | OXYGEN SATURATION: 100 %

## 2025-04-29 DIAGNOSIS — R77.0 LOW SERUM ALBUMIN: ICD-10-CM

## 2025-04-29 DIAGNOSIS — E78.2 MIXED HYPERLIPIDEMIA: ICD-10-CM

## 2025-04-29 DIAGNOSIS — Z09 HOSPITAL DISCHARGE FOLLOW-UP: Primary | ICD-10-CM

## 2025-04-29 DIAGNOSIS — K56.609 SBO (SMALL BOWEL OBSTRUCTION) (HCC): ICD-10-CM

## 2025-04-29 DIAGNOSIS — Z91.018 ALLERGY TO ALPHA-GAL: ICD-10-CM

## 2025-04-29 DIAGNOSIS — G47.00 INSOMNIA, UNSPECIFIED TYPE: ICD-10-CM

## 2025-04-29 DIAGNOSIS — E03.9 HYPOTHYROIDISM, UNSPECIFIED TYPE: ICD-10-CM

## 2025-04-29 DIAGNOSIS — G25.81 RESTLESS LEG SYNDROME: ICD-10-CM

## 2025-04-29 DIAGNOSIS — Z71.84 COUNSELING FOR TRAVEL: ICD-10-CM

## 2025-04-29 DIAGNOSIS — I10 ESSENTIAL HYPERTENSION: ICD-10-CM

## 2025-04-29 DIAGNOSIS — F40.240 CLAUSTROPHOBIA: ICD-10-CM

## 2025-04-29 DIAGNOSIS — D50.8 OTHER IRON DEFICIENCY ANEMIA: ICD-10-CM

## 2025-04-29 RX ORDER — LORAZEPAM 0.5 MG/1
TABLET ORAL
Qty: 2 TABLET | Refills: 0 | Status: SHIPPED | OUTPATIENT
Start: 2025-04-29 | End: 2025-05-29

## 2025-04-29 RX ORDER — ESZOPICLONE 2 MG/1
2 TABLET, FILM COATED ORAL NIGHTLY
COMMUNITY

## 2025-04-29 RX ORDER — FAMOTIDINE 20 MG/1
20 TABLET, FILM COATED ORAL NIGHTLY PRN
COMMUNITY
Start: 2025-04-07

## 2025-04-29 RX ORDER — MONTELUKAST SODIUM 10 MG/1
10 TABLET ORAL NIGHTLY
COMMUNITY
Start: 2025-04-07

## 2025-04-29 RX ORDER — PREGABALIN 25 MG/1
25 CAPSULE ORAL EVERY EVENING
Qty: 30 CAPSULE | Refills: 1 | Status: SHIPPED | OUTPATIENT
Start: 2025-04-29 | End: 2025-05-29

## 2025-04-29 ASSESSMENT — PATIENT HEALTH QUESTIONNAIRE - PHQ9
1. LITTLE INTEREST OR PLEASURE IN DOING THINGS: NOT AT ALL
SUM OF ALL RESPONSES TO PHQ QUESTIONS 1-9: 0
2. FEELING DOWN, DEPRESSED OR HOPELESS: NOT AT ALL

## 2025-04-29 NOTE — PROGRESS NOTES
Patient here for hospital follow up today.    \"Have you been to the ER, urgent care clinic since your last visit?  Hospitalized since your last visit?\"    YES - When: approximately 7 days ago.  Where and Why: [facility]. Small bowel resection.    “Have you seen or consulted any other health care providers outside our system since your last visit?”    Dr. Sterling     Have you had a mammogram?”   Refused    Date of last Mammogram: 2/7/2020       Incidental finding   Stable pancreatic tail lobulated lesion. Nonemergent OUTPATIENT  contrast-enhanced MRI/MRCP of the abdomen can be obtained for further  characterization.

## 2025-04-29 NOTE — PROGRESS NOTES
Lap    SMALL INTESTINE SURGERY  3/6/20    TUBAL LIGATION  1991    UPPER GASTROINTESTINAL ENDOSCOPY  02/07/2020    UPPER GASTROINTESTINAL ENDOSCOPY N/A 2/14/2024    EGD ESOPHAGOGASTRODUODENOSCOPY W/ BXS performed by Senia Sánchez MD at [facility] ENDOSCOPY     Family History   Problem Relation Age of Onset    Diabetes Brother         Type 2    Hypertension Sister     Other Sister         MTHFR    Other Father         CHF    Cancer Father         skin    Hearing Loss Father     Heart Attack Father     Heart Disease Father     High Blood Pressure Father     High Cholesterol Father     Cancer Mother         lung    Osteoarthritis Mother     High Blood Pressure Mother     Rheum Arthritis Mother        Social History     Socioeconomic History    Marital status:      Spouse name: Not on file    Number of children: Not on file    Years of education: Not on file    Highest education level: Not on file   Occupational History    Not on file   Tobacco Use    Smoking status: Never    Smokeless tobacco: Never   Vaping Use    Vaping status: Never Used   Substance and Sexual Activity    Alcohol use: Never    Drug use: Never    Sexual activity: Yes     Comment:  Only   Other Topics Concern    Not on file   Social History Narrative    Not on file     Social Drivers of Health     Financial Resource Strain: Low Risk  (6/21/2024)    Overall Financial Resource Strain (CARDIA)     Difficulty of Paying Living Expenses: Not hard at all   Food Insecurity: No Food Insecurity (4/17/2025)    Hunger Vital Sign     Worried About Running Out of Food in the Last Year: Never true     Ran Out of Food in the Last Year: Never true   Transportation Needs: No Transportation Needs (4/17/2025)    PRAPARE - Transportation     Lack of Transportation (Medical): No     Lack of Transportation (Non-Medical): No   Physical Activity: Not on file   Stress: Not on file   Social Connections: Not on file   Intimate Partner Violence: Not At Risk

## 2025-05-02 ENCOUNTER — CLINICAL DOCUMENTATION (OUTPATIENT)
Age: 75
End: 2025-05-02

## 2025-05-02 ENCOUNTER — OFFICE VISIT (OUTPATIENT)
Age: 75
End: 2025-05-02

## 2025-05-02 VITALS
RESPIRATION RATE: 16 BRPM | WEIGHT: 129 LBS | HEIGHT: 62 IN | SYSTOLIC BLOOD PRESSURE: 118 MMHG | BODY MASS INDEX: 23.74 KG/M2 | DIASTOLIC BLOOD PRESSURE: 82 MMHG | OXYGEN SATURATION: 95 % | TEMPERATURE: 96.9 F | HEART RATE: 74 BPM

## 2025-05-02 DIAGNOSIS — K56.609 SMALL BOWEL OBSTRUCTION (HCC): Primary | ICD-10-CM

## 2025-05-02 PROCEDURE — 99024 POSTOP FOLLOW-UP VISIT: CPT | Performed by: SURGERY

## 2025-05-02 ASSESSMENT — ENCOUNTER SYMPTOMS: ABDOMINAL PAIN: 0

## 2025-05-02 NOTE — PROGRESS NOTES
CC:   Chief Complaint   Patient presents with    Post-Op Check     SBO 04/18        Assessment:    ICD-10-CM    1. Small bowel obstruction (HCC)  K56.609           Plan: She is doing well since surgery and has no complaints.  She should continue with lifting restrictions for a total of 6 weeks from surgery and then may resume activity as tolerated after that time.  There is no need for additional follow-up unless there are questions or concerns in the future.  She agrees with this plan.  Unclear the etiology of the chronic inflammation and stricture.  I have recommended she follow-up with her GI doctor Dr. Mae to discuss if capsule endoscopy is indicated.        HPI:  Yodit Yoo is a 74 y.o. female who is status post laparotomy with small bowel resection for small bowel obstruction on 4/18/2025.  No fevers, chills or drainage from her incision.  She is tolerating a regular diet moving her bowels without difficulty.  She denies any pain.    Allergies:  Allergies   Allergen Reactions    Phenergan [Promethazine Hcl]     Celecoxib Other (See Comments)     mental confusion  Other reaction(s): psychological reaction  Confusion    Doxycycline Hyclate Other (See Comments)    Promethazine Other (See Comments)     extremely restless    Statins Myalgia     Mild to moderate    Chicken Allergy Nausea And Vomiting    Doxycycline Anxiety       Medication Review:  Current Outpatient Medications on File Prior to Visit   Medication Sig Dispense Refill    famotidine (PEPCID) 20 MG tablet Take 1 tablet by mouth nightly as needed      montelukast (SINGULAIR) 10 MG tablet Take 1 tablet by mouth nightly      eszopiclone (LUNESTA) 2 MG TABS Take 1 tablet by mouth nightly.      pregabalin (LYRICA) 25 MG capsule Take 1 capsule by mouth every evening for 30 days. .  DOSE ADJUSTMENT APRIL 2025.  Take in place of Pregabalin 50mg. Max Daily Amount: 25 mg 30 capsule 1    LORazepam (ATIVAN) 0.5 MG tablet Take HALF a tablet by mouth for

## 2025-05-02 NOTE — PROGRESS NOTES
Patient was referred to Dr. Senia Sánchez @ [facility] for consultation. Her office will call the patient for an appointment.

## 2025-05-02 NOTE — PROGRESS NOTES
Yodit Yoo is a 74 y.o. female (: 1950)     Chief Complaint   Patient presents with    Post-Op Check     SBO        Medication list and allergies have been reviewed with Yodit Yoo and updated as of today's date.     I have gone over all Medical, Surgical and Social History with Yodit Yoo and updated/added the information accordingly.     1. Have you been to the ER, urgent care clinic since your last visit?  Hospitalized since your last visit?No    2. Have you seen or consulted any other health care providers outside of the [facility] since your last visit?  Include any pap smears or colon screening. No

## 2025-05-07 ENCOUNTER — CARE COORDINATION (OUTPATIENT)
Facility: CLINIC | Age: 75
End: 2025-05-07

## 2025-05-07 NOTE — CARE COORDINATION
Care Transitions Note    Follow Up Call     Patient Current Location:  [place]    Care Transition Nurse contacted the patient by telephone. Verified name and  as identifiers.    Additional needs identified to be addressed with provider   No needs identified                 Method of communication with provider: none.    Care Summary Note:   Patient reports:   - She is doing really well   - Back to her normal routine   - GI appointment is scheduled for next week  - No needs at this time     Assessments:  No changes since last call per phone outreach with patient.     Remote Patient Monitoring:  [facility] for in-home monitoring:  no - as chronic medical condition  , HTN , appears stable at this time.     Follow Up Appointment:   Per chart review and discussion with patient the following medical appointments were attended:     - PCP   - Post Operative   Future Appointments         Provider Specialty Dept Phone    2025 11:00 AM [facility] HOME SLEEP STUDY Sleep Medicine [phone]    2025 10:00 AM Yodit Camacho MD Family Medicine [phone]            Attempt to follow up with Advanced Care Planning   Assess for needs     Lynnette Brooke RN

## 2025-05-14 ENCOUNTER — CARE COORDINATION (OUTPATIENT)
Facility: CLINIC | Age: 75
End: 2025-05-14

## 2025-05-14 NOTE — CARE COORDINATION
Care Transitions Note    Follow Up Call     Attempted to reach patient for transitions of care follow up.  Unable to reach patient.      Outreach Attempts:   HIPAA compliant voicemail left for patient.       Follow Up Appointment:   Future Appointments         Provider Specialty Dept Phone    6/2/2025 11:00 AM [facility] HOME SLEEP STUDY Sleep Medicine [phone]    6/17/2025 10:00 AM Yodit Camacho MD Family Medicine [phone]            Plan for follow-up call in 6-10 days based on severity of symptoms and risk factors. Plan for next call:   - Follow up with GI appt.   - Attempt to follow up with advanced care planning / plan as needed.    - Review for closure of the Care Transitions program if at least 30 days post hospital discharge including day of discharge.     Lynnette Brooke RN

## 2025-05-22 ENCOUNTER — CARE COORDINATION (OUTPATIENT)
Facility: CLINIC | Age: 75
End: 2025-05-22

## 2025-05-22 NOTE — CARE COORDINATION
Care Transitions Note    Final Call     Attempted to reach patient for transitions of care follow up.  Unable to reach patient.      Outreach Attempts:   HIPAA compliant voicemail left for patient.     Patient graduated from the Care Transitions program on 5-22-25 .    Handoff:   Patient was not referred to the ACM team due to unable to contact patient.          Upcoming Appointments:    Future Appointments         Provider Specialty Dept Phone    6/2/2025 11:00 AM [facility] HOME SLEEP STUDY Sleep Medicine [phone]    6/17/2025 10:00 AM Yodit Camacho MD Family Medicine [phone]            Lynnette Brooke RN

## 2025-06-03 DIAGNOSIS — M13.0 POLYARTHRITIS: ICD-10-CM

## 2025-06-05 DIAGNOSIS — I10 ESSENTIAL HYPERTENSION: ICD-10-CM

## 2025-06-05 RX ORDER — DICLOFENAC SODIUM 75 MG/1
TABLET, DELAYED RELEASE ORAL
Qty: 90 TABLET | Refills: 0 | Status: SHIPPED | OUTPATIENT
Start: 2025-06-05

## 2025-06-05 RX ORDER — AMLODIPINE BESYLATE 5 MG/1
5 TABLET ORAL DAILY
Qty: 90 TABLET | Refills: 0 | Status: SHIPPED | OUTPATIENT
Start: 2025-06-05

## 2025-06-17 ENCOUNTER — OFFICE VISIT (OUTPATIENT)
Facility: CLINIC | Age: 75
End: 2025-06-17
Payer: MEDICARE

## 2025-06-17 ENCOUNTER — HOSPITAL ENCOUNTER (OUTPATIENT)
Age: 75
Setting detail: SPECIMEN
Discharge: HOME | End: 2025-06-20
Payer: MEDICARE

## 2025-06-17 VITALS
OXYGEN SATURATION: 98 % | HEART RATE: 62 BPM | SYSTOLIC BLOOD PRESSURE: 147 MMHG | TEMPERATURE: 98 F | RESPIRATION RATE: 18 BRPM | DIASTOLIC BLOOD PRESSURE: 66 MMHG | HEIGHT: 62 IN | BODY MASS INDEX: 24.11 KG/M2 | WEIGHT: 131 LBS

## 2025-06-17 DIAGNOSIS — E03.9 HYPOTHYROIDISM, UNSPECIFIED TYPE: ICD-10-CM

## 2025-06-17 DIAGNOSIS — J39.2 THROAT MASS: Primary | ICD-10-CM

## 2025-06-17 DIAGNOSIS — Z91.018 ALLERGY TO ALPHA-GAL: ICD-10-CM

## 2025-06-17 DIAGNOSIS — D50.8 OTHER IRON DEFICIENCY ANEMIA: ICD-10-CM

## 2025-06-17 DIAGNOSIS — G25.81 RESTLESS LEG SYNDROME: ICD-10-CM

## 2025-06-17 DIAGNOSIS — I10 ESSENTIAL HYPERTENSION: ICD-10-CM

## 2025-06-17 LAB — TSH, 3RD GENERATION: 1.17 UIU/ML (ref 0.27–4.2)

## 2025-06-17 PROCEDURE — 1159F MED LIST DOCD IN RCRD: CPT | Performed by: FAMILY MEDICINE

## 2025-06-17 PROCEDURE — G8420 CALC BMI NORM PARAMETERS: HCPCS | Performed by: FAMILY MEDICINE

## 2025-06-17 PROCEDURE — 84443 ASSAY THYROID STIM HORMONE: CPT

## 2025-06-17 PROCEDURE — 3078F DIAST BP <80 MM HG: CPT | Performed by: FAMILY MEDICINE

## 2025-06-17 PROCEDURE — 3077F SYST BP >= 140 MM HG: CPT | Performed by: FAMILY MEDICINE

## 2025-06-17 PROCEDURE — 1124F ACP DISCUSS-NO DSCNMKR DOCD: CPT | Performed by: FAMILY MEDICINE

## 2025-06-17 PROCEDURE — G8427 DOCREV CUR MEDS BY ELIG CLIN: HCPCS | Performed by: FAMILY MEDICINE

## 2025-06-17 PROCEDURE — 1160F RVW MEDS BY RX/DR IN RCRD: CPT | Performed by: FAMILY MEDICINE

## 2025-06-17 PROCEDURE — 99214 OFFICE O/P EST MOD 30 MIN: CPT | Performed by: FAMILY MEDICINE

## 2025-06-17 PROCEDURE — 1036F TOBACCO NON-USER: CPT | Performed by: FAMILY MEDICINE

## 2025-06-17 PROCEDURE — 3017F COLORECTAL CA SCREEN DOC REV: CPT | Performed by: FAMILY MEDICINE

## 2025-06-17 PROCEDURE — G8399 PT W/DXA RESULTS DOCUMENT: HCPCS | Performed by: FAMILY MEDICINE

## 2025-06-17 PROCEDURE — 1090F PRES/ABSN URINE INCON ASSESS: CPT | Performed by: FAMILY MEDICINE

## 2025-06-17 PROCEDURE — 36415 COLL VENOUS BLD VENIPUNCTURE: CPT | Performed by: FAMILY MEDICINE

## 2025-06-17 PROCEDURE — 1126F AMNT PAIN NOTED NONE PRSNT: CPT | Performed by: FAMILY MEDICINE

## 2025-06-17 RX ORDER — PREGABALIN 50 MG/1
50 CAPSULE ORAL
Qty: 90 CAPSULE | Refills: 1 | Status: SHIPPED | OUTPATIENT
Start: 2025-06-17 | End: 2025-12-14

## 2025-06-17 ASSESSMENT — PATIENT HEALTH QUESTIONNAIRE - PHQ9
SUM OF ALL RESPONSES TO PHQ QUESTIONS 1-9: 0
1. LITTLE INTEREST OR PLEASURE IN DOING THINGS: NOT AT ALL
SUM OF ALL RESPONSES TO PHQ QUESTIONS 1-9: 0
2. FEELING DOWN, DEPRESSED OR HOPELESS: NOT AT ALL

## 2025-06-17 NOTE — PROGRESS NOTES
Chief Complaint   Patient presents with    Follow-up     Chronic disease   States she has a lump feeling/sensation that comes and goes in her right neck  PATIENT WANTS REFILL OF 50 MG PREGABALIN  \"Have you been to the ER, urgent care clinic since your last visit?  Hospitalized since your last visit?\"    NO    “Have you seen or consulted any other health care providers outside our system since your last visit?”    NO    Have you had a mammogram?”   NO  PATIENT REFUSES MAMMO  Date of last Mammogram: 2/7/2020       BP this morning at home this morning was 139/65    Verbal order from Yodit Camacho MD to order labs/sign and draw them in office    Labs were drawn and sent to [place] by Dinora Wasserman LPN:    The following tubes were sent:    0 Lavendar, 0 Red, 1 SST, 0 Urine  TSH drawn  Draw site left antecubital.  Patient tolerated draw with no distress.   
Unable to Pay for Housing in the Last Year: No     Number of Times Moved in the Last Year: 1     Homeless in the Last Year: No       Social History     Tobacco Use   Smoking Status Never   Smokeless Tobacco Never         Current Outpatient Medications:     pregabalin (LYRICA) 50 MG capsule, Take 1 capsule by mouth nightly for 180 days. .  Caution, may cause dizziness. Max Daily Amount: 50 mg, Disp: 90 capsule, Rfl: 1    diclofenac (VOLTAREN) 75 MG EC tablet, TAKE 1 TABLET BY MOUTH NIGHTLY WITH FOOD IF NEEDED FOR PAIN/INFLAMMATION, Disp: 90 tablet, Rfl: 0    amLODIPine (NORVASC) 5 MG tablet, TAKE 1 TABLET BY MOUTH DAILY, Disp: 90 tablet, Rfl: 0    famotidine (PEPCID) 20 MG tablet, Take 1 tablet by mouth nightly as needed, Disp: , Rfl:     montelukast (SINGULAIR) 10 MG tablet, Take 1 tablet by mouth nightly, Disp: , Rfl:     eszopiclone (LUNESTA) 2 MG TABS, Take 1 tablet by mouth nightly., Disp: , Rfl:     docusate sodium (COLACE, DULCOLAX) 100 MG CAPS, Take 100 mg by mouth 2 times daily, Disp: 60 capsule, Rfl: 2    ferrous sulfate (FEROSUL) 325 (65 Fe) MG tablet, Take 1 tablet by mouth daily (with breakfast), Disp: 90 tablet, Rfl: 0    levothyroxine (SYNTHROID) 50 MCG tablet, TAKE 1 TABLET BY MOUTH DAILY, Disp: 90 tablet, Rfl: 0    aspirin 81 MG EC tablet, Take 1 tablet by mouth every other day, Disp: , Rfl:     rOPINIRole (REQUIP) 0.5 MG tablet, TAKE ONE OR TWO TABLETS BEFORE BED AND CAN TAKE ONE ADDITIONAL TABLET IF NEEDED FOR SYMPTOMS AFTER 1 HOUR. (Patient taking differently: Take 0.5 tablets by mouth nightly Take one or two tablets before bed, and can take one additional tablet if needed for symptoms after 1 hour.), Disp: 270 tablet, Rfl: 0    lisinopril-hydroCHLOROthiazide (PRINZIDE;ZESTORETIC) 20-12.5 MG per tablet, Take 1 tablet by mouth daily, Disp: 90 tablet, Rfl: 3    Turmeric 400 MG CAPS, Take by mouth, Disp: , Rfl:     L-Theanine 200 MG CAPS, , Disp: , Rfl:     ezetimibe (ZETIA) 10 MG tablet, Take 1

## 2025-06-19 ENCOUNTER — RESULTS FOLLOW-UP (OUTPATIENT)
Facility: CLINIC | Age: 75
End: 2025-06-19

## 2025-06-25 ENCOUNTER — TELEPHONE (OUTPATIENT)
Facility: CLINIC | Age: 75
End: 2025-06-25

## 2025-06-25 DIAGNOSIS — E61.1 IRON DEFICIENCY: ICD-10-CM

## 2025-06-25 DIAGNOSIS — I10 ESSENTIAL HYPERTENSION: ICD-10-CM

## 2025-06-25 RX ORDER — PNV NO.95/FERROUS FUM/FOLIC AC 28MG-0.8MG
1 TABLET ORAL
Qty: 90 TABLET | Refills: 0 | Status: SHIPPED | OUTPATIENT
Start: 2025-06-25

## 2025-06-25 RX ORDER — MONTELUKAST SODIUM 10 MG/1
10 TABLET ORAL NIGHTLY
Qty: 90 TABLET | Refills: 0 | Status: SHIPPED | OUTPATIENT
Start: 2025-06-25

## 2025-06-25 RX ORDER — AMLODIPINE BESYLATE 2.5 MG/1
2.5 TABLET ORAL DAILY
Qty: 90 TABLET | Refills: 0 | Status: SHIPPED | OUTPATIENT
Start: 2025-06-25

## 2025-06-25 NOTE — TELEPHONE ENCOUNTER
PER PCP LAST NOTE:  Amlodipine 2.5mg QAM & Amlodipine 5mg QHS     On medication list I only see Amlodipine 5 MG daily. [employer] message sent to patient to clarify what dose she is currently taking.

## 2025-07-02 ENCOUNTER — TRANSCRIBE ORDERS (OUTPATIENT)
Facility: HOSPITAL | Age: 75
End: 2025-07-02

## 2025-07-02 DIAGNOSIS — R53.83 OTHER FATIGUE: Primary | ICD-10-CM

## 2025-07-02 DIAGNOSIS — K56.609 SMALL BOWEL OBSTRUCTION (HCC): ICD-10-CM

## 2025-07-02 DIAGNOSIS — K56.699 SMALL BOWEL STRICTURE (HCC): ICD-10-CM

## 2025-07-11 ENCOUNTER — HOSPITAL ENCOUNTER (OUTPATIENT)
Age: 75
Discharge: HOME | End: 2025-07-14
Payer: MEDICARE

## 2025-07-11 DIAGNOSIS — K56.609 SMALL BOWEL OBSTRUCTION (HCC): ICD-10-CM

## 2025-07-11 DIAGNOSIS — R53.83 OTHER FATIGUE: ICD-10-CM

## 2025-07-11 DIAGNOSIS — K56.699 SMALL BOWEL STRICTURE (HCC): ICD-10-CM

## 2025-07-11 PROCEDURE — 2500000003 HC RX 250 WO HCPCS: Performed by: PHYSICIAN ASSISTANT

## 2025-07-11 PROCEDURE — 74250 X-RAY XM SM INT 1CNTRST STD: CPT

## 2025-07-11 RX ADMIN — BARIUM SULFATE 355 ML: 0.6 SUSPENSION ORAL at 09:00

## 2025-07-11 RX ADMIN — BARIUM SULFATE 355 ML: 0.6 SUSPENSION ORAL at 10:22

## 2025-07-30 ENCOUNTER — HOSPITAL ENCOUNTER (OUTPATIENT)
Dept: SLEEP MEDICINE | Facility: HOSPITAL | Age: 75
Discharge: HOME | End: 2025-08-02
Attending: OTOLARYNGOLOGY
Payer: MEDICARE

## 2025-07-30 DIAGNOSIS — G47.33 OBSTRUCTIVE SLEEP APNEA (ADULT) (PEDIATRIC): ICD-10-CM

## 2025-07-30 DIAGNOSIS — R06.83 SNORING: ICD-10-CM

## 2025-07-30 PROCEDURE — 95800 SLP STDY UNATTENDED: CPT

## 2025-08-12 PROBLEM — R00.1 BRADYCARDIA: Status: ACTIVE | Noted: 2025-08-12

## 2025-08-12 PROBLEM — R06.83 SNORING: Status: ACTIVE | Noted: 2025-08-12

## 2025-08-18 ENCOUNTER — TELEPHONE (OUTPATIENT)
Age: 75
End: 2025-08-18

## 2025-08-18 ENCOUNTER — CLINICAL DOCUMENTATION (OUTPATIENT)
Age: 75
End: 2025-08-18

## (undated) DEVICE — SUTURE VICRYL + SZ 3-0 L27IN ABSRB UD L26MM SH 1/2 CIR VCP416H

## (undated) DEVICE — BITE BLOCK ENDOSCP UNIV AD 6 TO 9.4 MM

## (undated) DEVICE — SCISSORS ENDOSCP DIA5MM CRV MPLR CAUT W/ RATCH HNDL

## (undated) DEVICE — CANNULA NSL AD TBNG L14FT STD PVC O2 CRV CONN NONFLARED NSL

## (undated) DEVICE — NEPTUNE E-SEP SMOKE EVACUATION PENCIL, COATED, 70MM BLADE, PUSH BUTTON SWITCH: Brand: NEPTUNE E-SEP

## (undated) DEVICE — Device

## (undated) DEVICE — ELECTRODE ES 36CM LAP FLAT L HK COAT DISP CLEANCOAT

## (undated) DEVICE — CLEAN UP KIT: Brand: MEDLINE INDUSTRIES, INC.

## (undated) DEVICE — GLOVE ORANGE PI 7 1/2   MSG9075

## (undated) DEVICE — TISSUE RETRIEVAL SYSTEM: Brand: INZII RETRIEVAL SYSTEM

## (undated) DEVICE — ADHESIVE SKIN CLSR 0.7ML TOP DERMBND ADV

## (undated) DEVICE — NDL CNTR 40CT FM MAG: Brand: MEDLINE INDUSTRIES, INC.

## (undated) DEVICE — 2, DISPOSABLE SUCTION/IRRIGATOR WITH DISPOSABLE TIP: Brand: STRYKEFLOW

## (undated) DEVICE — SYRINGE MED 10ML LUERLOCK TIP W/O SFTY DISP

## (undated) DEVICE — GOWN,PREVENTION PLUS,XL,ST,24/CS: Brand: MEDLINE

## (undated) DEVICE — TROCAR: Brand: KII® OPTICAL ACCESS SYSTEM

## (undated) DEVICE — SUTURE VICRYL + SZ 0 L27IN ABSRB VLT L26MM UR-6 5/8 CIR VCP603H

## (undated) DEVICE — LINER SUCT CANSTR 3000CC PLAS SFT PRE ASSEMB W/OUT TBNG W/

## (undated) DEVICE — GAUZE,SPONGE,4"X4",16PLY,STRL,LF,10/TRAY: Brand: MEDLINE

## (undated) DEVICE — ELECTRODE PT RET AD L9FT HI MOIST COND ADH HYDRGEL CORDED

## (undated) DEVICE — SEALER ENDOSCP NANO COAT OPN DIV CRV L JAW LIGASURE IMPACT

## (undated) DEVICE — SYRINGE MED 25GA 3ML L5/8IN SUBQ PLAS W/ DETACH NDL SFTY

## (undated) DEVICE — SUTURE PDS II SZ 1 L96IN ABSRB VLT TP-1 L65MM 1/2 CIR Z880G

## (undated) DEVICE — SOLUTION IRRIG 1000ML H2O STRL BLT

## (undated) DEVICE — SYRINGE MED 50ML LUERSLIP TIP

## (undated) DEVICE — UNDERPAD INCONT W23XL36IN STD BLU POLYPR BK FLUF SFT

## (undated) DEVICE — MASTISOL ADHESIVE LIQ 2/3ML

## (undated) DEVICE — YANKAUER,SMOOTH HANDLE,HIGH CAPACITY: Brand: MEDLINE INDUSTRIES, INC.

## (undated) DEVICE — TROCAR: Brand: KII® SLEEVE

## (undated) DEVICE — STRIP SKIN CLSR W1XL5IN NYLON REINF CURAD STERIL

## (undated) DEVICE — RELOAD STPL L75MM OPN H3.8MM CLS 1.5MM WIRE DIA0.2MM REG

## (undated) DEVICE — LAPAROSCOPIC TROCAR SLEEVE/SINGLE USE: Brand: KII® OPTICAL ACCESS SYSTEM

## (undated) DEVICE — GLOVE SURG SZ 6 CRM LTX FREE POLYISOPRENE POLYMER BEAD ANTI

## (undated) DEVICE — MEDI-VAC NON-CONDUCTIVE SUCTION TUBING: Brand: CARDINAL HEALTH

## (undated) DEVICE — GLOVE SURG SZ 65 L12IN FNGR THK79MIL GRN LTX FREE

## (undated) DEVICE — SKIN MARKER,REGULAR TIP WITH RULER: Brand: DEVON

## (undated) DEVICE — STAPLER INT L75MM CUT LN L73MM STPL LN L77MM BLU B FRM 8

## (undated) DEVICE — NEEDLE INSUFFLATION 14 GAX120 MM SURGINEEDLE

## (undated) DEVICE — CATHETER SUCT TR FL TIP 14FR W/ O CTRL

## (undated) DEVICE — APPLICATOR MEDICATED 26 CC SOLUTION HI LT ORNG CHLORAPREP

## (undated) DEVICE — SOL IRR STRL H2O 500ML STRL --

## (undated) DEVICE — APPLIER CLP M L L11.4IN DIA10MM ENDOSCP ROT MULT FOR LIG

## (undated) DEVICE — ENDOSCOPY PUMP TUBING/ CAP SET: Brand: ERBE

## (undated) DEVICE — SUTURE MONOCRYL SZ 4-0 L27IN ABSRB UD L24MM PS-1 3/8 CIR PRIM Y935H

## (undated) DEVICE — STERILE POLYISOPRENE POWDER-FREE SURGICAL GLOVES: Brand: PROTEXIS

## (undated) DEVICE — FORCEPS BX L240CM JAW DIA2.4MM ORNG L CAP W/ NDL DISP RAD

## (undated) DEVICE — DRAPE TOWEL: Brand: CONVERTORS

## (undated) DEVICE — GLOVE SURG SZ 65 THK91MIL LTX FREE SYN POLYISOPRENE

## (undated) DEVICE — GLOVE SURG SZ 6 THK91MIL LTX FREE SYN POLYISOPRENE ANTI

## (undated) DEVICE — BASIN EMSIS 16OZ GRAPHITE PLAS KID SHP MOLD GRAD FOR ORAL

## (undated) DEVICE — BLADE,STAINLESS-STEEL,11,STRL,DISPOSABLE: Brand: MEDLINE

## (undated) DEVICE — WOUND RETRACTOR AND PROTECTOR: Brand: ALEXIS O WOUND PROTECTOR-RETRACTOR